# Patient Record
Sex: FEMALE | Race: WHITE | NOT HISPANIC OR LATINO | Employment: FULL TIME | URBAN - METROPOLITAN AREA
[De-identification: names, ages, dates, MRNs, and addresses within clinical notes are randomized per-mention and may not be internally consistent; named-entity substitution may affect disease eponyms.]

---

## 2017-01-05 ENCOUNTER — TELEPHONE (OUTPATIENT)
Dept: ENDOCRINOLOGY | Facility: MEDICAL CENTER | Age: 32
End: 2017-01-05

## 2017-01-05 DIAGNOSIS — E03.9 ACQUIRED HYPOTHYROIDISM: ICD-10-CM

## 2017-01-05 NOTE — TELEPHONE ENCOUNTER
Pt did what she was told with her medication. She still doesn't feel well or that upping the dose changed anything. She is wondering what she can do now. Do you want her to do more labs or change dosing again?

## 2017-01-06 ENCOUNTER — HOSPITAL ENCOUNTER (OUTPATIENT)
Dept: LAB | Facility: MEDICAL CENTER | Age: 32
End: 2017-01-06
Attending: INTERNAL MEDICINE
Payer: COMMERCIAL

## 2017-01-06 DIAGNOSIS — E03.9 ACQUIRED HYPOTHYROIDISM: ICD-10-CM

## 2017-01-06 LAB
T4 FREE SERPL-MCNC: 1.3 NG/DL (ref 0.53–1.43)
TSH SERPL DL<=0.005 MIU/L-ACNC: 0.64 UIU/ML (ref 0.3–3.7)

## 2017-01-06 PROCEDURE — 36415 COLL VENOUS BLD VENIPUNCTURE: CPT

## 2017-01-06 PROCEDURE — 84439 ASSAY OF FREE THYROXINE: CPT

## 2017-01-06 PROCEDURE — 84443 ASSAY THYROID STIM HORMONE: CPT

## 2017-01-16 ENCOUNTER — TELEPHONE (OUTPATIENT)
Dept: MEDICAL GROUP | Facility: MEDICAL CENTER | Age: 32
End: 2017-01-16

## 2017-01-16 DIAGNOSIS — G89.29 CHRONIC ABDOMINAL PAIN: ICD-10-CM

## 2017-01-16 DIAGNOSIS — R10.9 CHRONIC ABDOMINAL PAIN: ICD-10-CM

## 2017-01-16 NOTE — TELEPHONE ENCOUNTER
1. Caller Name: Roxane                                         Call Back Number: [phone]      Patient approves a detailed voicemail message: N\A    2. SPECIFIC Action To Be Taken: Referral pending, please sign.    3. Diagnosis/Clinical Reason for Request: Chronic Abdominal Pain    4. Specialty & Provider Name/Lab/Imaging Location: Acupuncture    5. Is appointment scheduled for requested order/referral: no    Patient informed they will receive a return phone call from the office ONLY if there are any questions before processing their request. Advised to call back if they haven't received a call from the referral department in 5 days.

## 2017-02-15 ENCOUNTER — OFFICE VISIT (OUTPATIENT)
Dept: OTHER | Facility: IMAGING CENTER | Age: 32
End: 2017-02-15
Payer: COMMERCIAL

## 2017-02-15 DIAGNOSIS — E03.9 HYPOTHYROIDISM, UNSPECIFIED TYPE: ICD-10-CM

## 2017-02-15 DIAGNOSIS — R10.13 DYSPEPSIA: ICD-10-CM

## 2017-02-15 PROCEDURE — 97811 ACUP 1/> W/O ESTIM EA ADD 15: CPT | Performed by: FAMILY MEDICINE

## 2017-02-15 PROCEDURE — 97810 ACUP 1/> WO ESTIM 1ST 15 MIN: CPT | Performed by: FAMILY MEDICINE

## 2017-02-15 PROCEDURE — 99203 OFFICE O/P NEW LOW 30 MIN: CPT | Mod: 25 | Performed by: FAMILY MEDICINE

## 2017-02-15 NOTE — MR AVS SNAPSHOT
Roxane Toth   2/15/2017 9:00 AM   Office Visit   MRN: [id number]    Department:  Acupuncture Med   Dept Phone:  [phone]    Description:  Female : 1985   Provider:  Damion Fisher D.O.           Allergies as of 2/15/2017     Allergen Noted Reactions    Iodine 2013   Anaphylaxis, Hives      You were diagnosed with     Dyspepsia   [275686]       Hypothyroidism, unspecified type   [4755624]         Vital Signs     Smoking Status                   Current Every Day Smoker           Basic Information     Date Of Birth Sex Race Ethnicity Preferred Language    1985 Female White Non- English      Your appointments     2017  8:00 AM   ACUPUNCTURE 30 with [facility] (--)    [address]  [place] [address]   [phone]            Mar 10, 2017  2:30 PM   ACUPUNCTURE 30 with [facility] (--)    [address]  [place] [address]   [phone]            2017  1:00 PM   Established Patient with Jamar Doty M.D.   [facility]    [address] [facility], [address]  [facility] [id number] [phone]           You will be receiving a confirmation call a few days before your appointment from our automated call confirmation system.              Problem List              ICD-10-CM Priority Class Noted - Resolved    Hypothyroidism E03.9   3/30/2015 - Present    Migraine G43.909   3/30/2015 - Present    Family history of thyroid cancer Z80.8   2015 - Present    Dyspepsia R10.13   2/15/2017 - Present      Health Maintenance        Date Due Completion Dates    IMM INFLUENZA (1) 2016 10/27/2015, 10/13/2014, 2013, 10/8/2012    PAP SMEAR 2/3/2019 2/3/2016, 2015 (N/S)    Override on 2015: (N/S) ([facility] center)    IMM DTaP/Tdap/Td Vaccine (2 - Td) 2025              Current Immunizations     Influenza TIV (IM) 9/28/2013, 10/8/2012    Influenza Vaccine Quad Inj (Pf) 10/13/2014    Influenza Vaccine Quad Inj (Preserved) 10/27/2015    Pneumococcal polysaccharide vaccine (PPSV-23) 9/28/2013    Tdap Vaccine 12/11/2015    Tuberculin Skin Test 4/17/2013 12:00 PM, 5/23/2012 12:20 PM, 5/1/2012  5:06 PM, 4/23/2012  4:00 PM      Below and/or attached are the medications your provider expects you to take. Review all of your home medications and newly ordered medications with your provider and/or pharmacist. Follow medication instructions as directed by your provider and/or pharmacist. Please keep your medication list with you and share with your provider. Update the information when medications are discontinued, doses are changed, or new medications (including over-the-counter products) are added; and carry medication information at all times in the event of emergency situations     Allergies:  IODINE - Anaphylaxis,Hives               Medications  Valid as of: February 15, 2017 - 10:15 AM    Generic Name Brand Name Tablet Size Instructions for use    Levothyroxine Sodium (Tab) SYNTHROID 112 MCG Take 1 Tab by mouth Every morning on an empty stomach.        Pantoprazole Sodium (Tablet Delayed Response) PROTONIX 40 MG Take 1 Tab by mouth every day.        .                 Medicines prescribed today were sent to:     [facility]/PHARMACY #[id number] - [place] - [address]    [address] [place] [address]    Phone: [phone] Fax: [phone]    Open 24 Hours?: No      Medication refill instructions:       If your prescription bottle indicates you have medication refills left, it is not necessary to call your provider’s office. Please contact your pharmacy and they will refill your medication.    If your prescription bottle indicates you do not have any refills left, you may request refills at any time through one of the following ways: The online [employer] system (except Urgent  Care), by calling your provider’s office, or by asking your pharmacy to contact your provider’s office with a refill request. Medication refills are processed only during regular business hours and may not be available until the next business day. Your provider may request additional information or to have a follow-up visit with you prior to refilling your medication.   *Please Note: Medication refills are assigned a new Rx number when refilled electronically. Your pharmacy may indicate that no refills were authorized even though a new prescription for the same medication is available at the pharmacy. Please request the medicine by name with the pharmacy before contacting your provider for a refill.        Instructions    Have encouraged the patient to rest after the acupuncture session today - taking naps or going to sleep early as necessary.  Increase intake of water and refrain from strenuous activities.  Patient may expect to feel transient worsening of symptoms, but this should resolve to benefit in the next day or two after treatment.    The side effects of Acupuncture needle insertion include: minor bruising, bleeding, or pain at the site of needle insertion.  If more worrisome symptoms, such as continued bleeding, severe bruising, or continue pain or altered sensation persist, please contact [facility] office @ [phone]            X-IO Access Code: Activation code not generated  Current X-IO Status: Active

## 2017-02-15 NOTE — PROGRESS NOTES
[facility] Acupuncture Progress Note  [address] [place] [address]  Dept: [phone]      Patient Name: Roxane Toth   MRN: [id number]  YOB: 1985  PCP: JOSEPH Goodman  Date of Service: 2/15/2017  9:31 AM    CC Stomach burning   HPI Patient is a 31 yo female with increased stomach burning that seems to be returning after stopping Pantoprezole around 3 weeks ago with concerns over the long term effect of PPI.  Since she was 21 she consistently having stomach burning sensation around 1-2 hours after dinner time which would be around 8-9 PM.  She has tried to modify her diet and it only improves a little with no significant lasting effects.  As she becomes more achy in her stomach it also precipitated as bloating and nausea.  Eventually headache.  She also has hypothyroidism and has never been to acupuncture before.     ROS Birthplace: Not asked  Color:  Season:  -handed  Scars:   PMH Past Medical History   Diagnosis Date   • Renal disorder 1/2011     kidney infection   • Heart burn    • Indigestion    • Thyroid disease      Past Surgical History   Procedure Laterality Date   • Elbow orif  1991     right elbow   • Tonsillectomy and adenoidectomy  as child   • Gastroscopy-endo  10/25/2013     Performed by Zohaib Felix M.D. at [facility]   • Egd with asp/bx  10/25/2013     Performed by Zohaib Fleix M.D. at [facility] Social History     Social History   • Marital Status:      Spouse Name: N/A   • Number of Children: N/A   • Years of Education: N/A     Social History Main Topics   • Smoking status: Current Every Day Smoker -- 0.50 packs/day for 10 years     Types: Cigarettes   • Smokeless tobacco: Not on file      Comment: one pack every other day   • Alcohol Use: 0.5 oz/week     1 Glasses of wine per week      Comment: twice a year   • Drug Use: No   • Sexual Activity:     Partners: Male     Birth  Control/ Protection: Pill     Other Topics Concern   • Not on file     Social History Narrative      MEDS Current Outpatient Prescriptions on File Prior to Visit   Medication Sig Dispense Refill   • pantoprazole (PROTONIX) 40 MG Tablet Delayed Response Take 1 Tab by mouth every day. 30 Tab 11   • SYNTHROID 112 MCG Tab Take 1 Tab by mouth Every morning on an empty stomach. 90 Tab 3     No current facility-administered medications on file prior to visit.      ALLERGIES Allergies   Allergen Reactions   • Iodine Anaphylaxis and Hives      PE Praveen Exam: Stomach Qi: (+) pecking radial pulse  (+) Oketsu, (+) Immune,   (R) Adrenal - B/LKid16 St9 DaiMai ASIS Kid2         Assessment Eastern Liver/blood stagnation, Stomach qi imbalance, Immune imbalance, Adrenal exhaustion.    Western Encounter Diagnoses   Name Primary?   • Dyspepsia    • Hypothyroidism, unspecified type                   Plan Set 1: Left (Lv4, Lu5)  Set 2: B/L LI10-11 area  Set 3: R (LI 4 --> LI 10, TW 5, SP 6 --> SP 9, LR 8), L (HANS 7 --> HANS 5, PC 6 ST 39 --> ST 36, GB 34, Ht 5)  Set 4: Stomach area (B/L Er pung 2 ji),      Damion Fisher D.O.    >16 minutes of this 30 minute interview were spent in discussing the benefits and utility of acupuncture.  I answered patient questions about efficacy, safety, and what to expect during a treatment, and the likely number of treatments needed.  Patient will schedule another appointment to return for treatment.

## 2017-02-15 NOTE — PATIENT INSTRUCTIONS
Have encouraged the patient to rest after the acupuncture session today - taking naps or going to sleep early as necessary.  Increase intake of water and refrain from strenuous activities.  Patient may expect to feel transient worsening of symptoms, but this should resolve to benefit in the next day or two after treatment.    The side effects of Acupuncture needle insertion include: minor bruising, bleeding, or pain at the site of needle insertion.  If more worrisome symptoms, such as continued bleeding, severe bruising, or continue pain or altered sensation persist, please contact [facility] office @ [phone]

## 2017-03-22 ENCOUNTER — OFFICE VISIT (OUTPATIENT)
Dept: OTHER | Facility: IMAGING CENTER | Age: 32
End: 2017-03-22
Payer: COMMERCIAL

## 2017-03-22 DIAGNOSIS — G43.709 CHRONIC MIGRAINE WITHOUT AURA WITHOUT STATUS MIGRAINOSUS, NOT INTRACTABLE: ICD-10-CM

## 2017-03-22 DIAGNOSIS — R10.13 DYSPEPSIA: ICD-10-CM

## 2017-03-22 PROCEDURE — 99213 OFFICE O/P EST LOW 20 MIN: CPT | Mod: 25 | Performed by: FAMILY MEDICINE

## 2017-03-22 PROCEDURE — 97811 ACUP 1/> W/O ESTIM EA ADD 15: CPT | Performed by: FAMILY MEDICINE

## 2017-03-22 PROCEDURE — 97813 ACUP 1/> W/ESTIM 1ST 15 MIN: CPT | Performed by: FAMILY MEDICINE

## 2017-03-22 NOTE — MR AVS SNAPSHOT
Roxane Toth   3/22/2017 8:00 AM   Office Visit   MRN: [id number]    Department:  Acupuncture Med   Dept Phone:  [phone]    Description:  Female : 1985   Provider:  Damion Fisher D.O.           Allergies as of 3/22/2017     Allergen Noted Reactions    Iodine 2013   Anaphylaxis, Hives      You were diagnosed with     Dyspepsia   [856574]       Chronic migraine without aura without status migrainosus, not intractable   [840796]         Vital Signs     Smoking Status                   Current Every Day Smoker           Basic Information     Date Of Birth Sex Race Ethnicity Preferred Language    1985 Female White Non- English      Your appointments     2017  2:30 PM   ACUPUNCTURE 30 with Damion Fisher D.O.   [facility] (--)    [address] [facility].  [facility] [id number] [phone]            2017  1:00 PM   Established Patient with Jamar Doty M.D.   [facility]    [address]  [facility] [place] [facility] [id number] [phone]           You will be receiving a confirmation call a few days before your appointment from our automated call confirmation system.              Problem List              ICD-10-CM Priority Class Noted - Resolved    Hypothyroidism E03.9   3/30/2015 - Present    Migraine G43.909   3/30/2015 - Present    Family history of thyroid cancer Z80.8   2015 - Present    Dyspepsia R10.13   2/15/2017 - Present      Health Maintenance        Date Due Completion Dates    PAP SMEAR 2/3/2019 2/3/2016, 2015 (N/S)    Override on 2015: (N/S) ([facility] center)    IMM DTaP/Tdap/Td Vaccine (2 - Td) 2025            Current Immunizations     Influenza TIV (IM) 2016, 2013, 10/8/2012    Influenza Vaccine Quad Inj (Pf) 10/13/2014    Influenza Vaccine Quad Inj (Preserved) 10/27/2015    Pneumococcal polysaccharide vaccine  (PPSV-23) 9/28/2013    Tdap Vaccine 12/11/2015    Tuberculin Skin Test 4/17/2013 12:00 PM, 5/23/2012 12:20 PM, 5/1/2012  5:06 PM, 4/23/2012  4:00 PM      Below and/or attached are the medications your provider expects you to take. Review all of your home medications and newly ordered medications with your provider and/or pharmacist. Follow medication instructions as directed by your provider and/or pharmacist. Please keep your medication list with you and share with your provider. Update the information when medications are discontinued, doses are changed, or new medications (including over-the-counter products) are added; and carry medication information at all times in the event of emergency situations     Allergies:  IODINE - Anaphylaxis,Hives               Medications  Valid as of: March 22, 2017 -  9:03 AM    Generic Name Brand Name Tablet Size Instructions for use    Levothyroxine Sodium (Tab) SYNTHROID 112 MCG Take 1 Tab by mouth Every morning on an empty stomach.        Pantoprazole Sodium (Tablet Delayed Response) PROTONIX 40 MG Take 1 Tab by mouth every day.        .                 Medicines prescribed today were sent to:     [facility]/PHARMACY #[id number] - [place] - [address]    [address] [place] [address]    Phone: [phone] Fax: [phone]    Open 24 Hours?: No      Medication refill instructions:       If your prescription bottle indicates you have medication refills left, it is not necessary to call your provider’s office. Please contact your pharmacy and they will refill your medication.    If your prescription bottle indicates you do not have any refills left, you may request refills at any time through one of the following ways: The online [employer] system (except Urgent Care), by calling your provider’s office, or by asking your pharmacy to contact your provider’s office with a refill request. Medication refills are processed only during regular business hours and may not be available  until the next business day. Your provider may request additional information or to have a follow-up visit with you prior to refilling your medication.   *Please Note: Medication refills are assigned a new Rx number when refilled electronically. Your pharmacy may indicate that no refills were authorized even though a new prescription for the same medication is available at the pharmacy. Please request the medicine by name with the pharmacy before contacting your provider for a refill.        Instructions    Have encouraged the patient to rest after the acupuncture session today - taking naps or going to sleep early as necessary.  Increase intake of water and refrain from strenuous activities.  Patient may expect to feel transient worsening of symptoms, but this should resolve to benefit in the next day or two after treatment.    The side effects of Acupuncture needle insertion include: minor bruising, bleeding, or pain at the site of needle insertion.  If more worrisome symptoms, such as continued bleeding, severe bruising, or continue pain or altered sensation persist, please contact [facility] @ [phone]           My Pick Box Access Code: Activation code not generated  Current My Pick Box Status: Active          Quit Tobacco Information     Do you want to quit using tobacco?    Quitting tobacco decreases risks of cancer, heart and lung disease, increases life expectancy, improves sense of taste and smell, and increases spending money, among other benefits.    If you are thinking about quitting, we can help.  • [facility] Quit Tobacco Program: 194.330.9497  o Program occurs weekly for four weeks and includes pharmacist consultation on products to support quitting smoking or chewing tobacco. A provider referral is needed for pharmacist consultation.  • Tobacco Users Help Hotline: 6-176-QUIT-NOW (147-5488) or https://[place].quitlogix.org/  o Free, confidential telephone and online coaching for [place]  residents. Sessions are designed on a schedule that is convenient for you. Eligible clients receive free nicotine replacement therapy.  • Nationally: www.smokefree.gov  o Information and professional assistance to support both immediate and long-term needs as you become, and remain, a non-smoker. Smokefree.gov allows you to choose the help that best fits your needs.

## 2017-03-22 NOTE — PATIENT INSTRUCTIONS
Have encouraged the patient to rest after the acupuncture session today - taking naps or going to sleep early as necessary.  Increase intake of water and refrain from strenuous activities.  Patient may expect to feel transient worsening of symptoms, but this should resolve to benefit in the next day or two after treatment.    The side effects of Acupuncture needle insertion include: minor bruising, bleeding, or pain at the site of needle insertion.  If more worrisome symptoms, such as continued bleeding, severe bruising, or continue pain or altered sensation persist, please contact [facility] office @ [phone]

## 2017-03-22 NOTE — PROGRESS NOTES
[facility] Acupuncture Progress Note  [address] [place] [address]  Dept: [phone]      Patient Name: Roxane Toth   MRN: [id number]  YOB: 1985  PCP: JOSEPH Goodman  Date of Service: 3/22/2017  8:38 AM    CC Stomach burning   HPI Patient is a 31 yo female with increased stomach burning that seems to be returning after stopping Pantoprezole around 3 weeks ago with concerns over the long term effect of PPI.  Since she was 21 she consistently having stomach burning sensation around 1-2 hours after dinner time which would be around 8-9 PM.  She has tried to modify her diet and it only improves a little with no significant lasting effects.  As she becomes more achy in her stomach it also precipitated as bloating and nausea.  Eventually headache.  She also has hypothyroidism and has never been to acupuncture before.  Since last tx she has experienced good reduction of burning with only one episode of mild one lasting within an hour.  Her energy level has been increased along with no more headache since last tx, she is very pleased with this.   ROS Birthplace: Not asked  Color:  Season:  -handed  Scars:   PMH Past Medical History   Diagnosis Date   • Renal disorder 1/2011     kidney infection   • Heart burn    • Indigestion    • Thyroid disease      Past Surgical History   Procedure Laterality Date   • Elbow orif  1991     right elbow   • Tonsillectomy and adenoidectomy  as child   • Gastroscopy-endo  10/25/2013     Performed by Zohaib Felix M.D. at [facility]   • Egd with asp/bx  10/25/2013     Performed by Zohaib [facility] M.D. at [facility] Social History     Social History   • Marital Status:      Spouse Name: N/A   • Number of Children: N/A   • Years of Education: N/A     Social History Main Topics   • Smoking status: Current Every Day Smoker -- 0.50 packs/day for 10 years     Types: Cigarettes   •  Smokeless tobacco: Not on file      Comment: one pack every other day   • Alcohol Use: 0.5 oz/week     1 Glasses of wine per week      Comment: twice a year   • Drug Use: No   • Sexual Activity:     Partners: Male     Birth Control/ Protection: Pill     Other Topics Concern   • Not on file     Social History Narrative      MEDS Current Outpatient Prescriptions on File Prior to Visit   Medication Sig Dispense Refill   • pantoprazole (PROTONIX) 40 MG Tablet Delayed Response Take 1 Tab by mouth every day. 30 Tab 11   • SYNTHROID 112 MCG Tab Take 1 Tab by mouth Every morning on an empty stomach. 90 Tab 3     No current facility-administered medications on file prior to visit.      ALLERGIES Allergies   Allergen Reactions   • Iodine Anaphylaxis and Hives      PE Praveen Exam: Stomach Qi: (+) pecking radial pulse  (+) Oketsu, (+) Immune,   (R) Adrenal - B/LKid16 St9 DaiMai ASIS Kid2         Assessment Eastern Liver/blood stagnation, Stomach qi imbalance, Immune imbalance, Adrenal exhaustion.    Western Encounter Diagnoses   Name Primary?   • Dyspepsia    • Chronic migraine without aura without status migrainosus, not intractable                   Plan Set 1: Left (Lv4, Lu5)  Set 2: B/L LI10-11 area  Set 3: L (LI 4 --> LI 10, TW 5, SP 6 --> SP 9, LR 8), R (HANS 7 --> HANS 5, PC 6 ST 39 --> ST 36, GB 34, Ht 5)  Set 4: Stomach area (B/L Er pung 2 ji),      Damion Fisher D.O.    >More than 15 minutes were spent discussing with the patient about her condition which did not include procedure time for the treatment.

## 2017-04-14 ENCOUNTER — OFFICE VISIT (OUTPATIENT)
Dept: OTHER | Facility: IMAGING CENTER | Age: 32
End: 2017-04-14
Payer: COMMERCIAL

## 2017-04-14 DIAGNOSIS — E03.9 HYPOTHYROIDISM, UNSPECIFIED TYPE: ICD-10-CM

## 2017-04-14 DIAGNOSIS — R10.13 DYSPEPSIA: ICD-10-CM

## 2017-04-14 DIAGNOSIS — G43.709 CHRONIC MIGRAINE WITHOUT AURA WITHOUT STATUS MIGRAINOSUS, NOT INTRACTABLE: ICD-10-CM

## 2017-04-14 PROCEDURE — 97813 ACUP 1/> W/ESTIM 1ST 15 MIN: CPT | Performed by: FAMILY MEDICINE

## 2017-04-14 PROCEDURE — 97811 ACUP 1/> W/O ESTIM EA ADD 15: CPT | Performed by: FAMILY MEDICINE

## 2017-04-14 PROCEDURE — 99213 OFFICE O/P EST LOW 20 MIN: CPT | Mod: 25 | Performed by: FAMILY MEDICINE

## 2017-04-14 NOTE — PATIENT INSTRUCTIONS
Have encouraged the patient to rest after the acupuncture session today - taking naps or going to sleep early as necessary.  Increase intake of water and refrain from strenuous activities.  Patient may expect to feel transient worsening of symptoms, but this should resolve to benefit in the next day or two after treatment.    The side effects of Acupuncture needle insertion include: minor bruising, bleeding, or pain at the site of needle insertion.  If more worrisome symptoms, such as continued bleeding, severe bruising, or continue pain or altered sensation persist, please contact [facility] office @ [phone]

## 2017-04-14 NOTE — MR AVS SNAPSHOT
Roxane Toth   2017 2:30 PM   Office Visit   MRN: [id number]    Department:  Acupuncture Med   Dept Phone:  [phone]    Description:  Female : 1985   Provider:  Damion Fisher D.O.           Allergies as of 2017     Allergen Noted Reactions    Iodine 2013   Anaphylaxis, Hives      You were diagnosed with     Dyspepsia   [410489]       Hypothyroidism, unspecified type   [2577747]       Chronic migraine without aura without status migrainosus, not intractable   [943442]         Vital Signs     Smoking Status                   Current Every Day Smoker           Basic Information     Date Of Birth Sex Race Ethnicity Preferred Language    1985 Female White Non- English      Your appointments     May 02, 2017 10:30 AM   ACUPUNCTURE 30 with Damion Fisher D.O.   [facility] (--)    [address] [facility].  [place] [id number] [phone]            2017  1:00 PM   Established Patient with Jamar Doty M.D.   [facility]    [address]  [place] [id number] [phone]           You will be receiving a confirmation call a few days before your appointment from our automated call confirmation system.              Problem List              ICD-10-CM Priority Class Noted - Resolved    Hypothyroidism E03.9   3/30/2015 - Present    Migraine G43.909   3/30/2015 - Present    Family history of thyroid cancer Z80.8   2015 - Present    Dyspepsia R10.13   2/15/2017 - Present      Health Maintenance        Date Due Completion Dates    PAP SMEAR 2/3/2019 2/3/2016, 2015 (N/S)    Override on 2015: (N/S) ([facility] center)    IMM DTaP/Tdap/Td Vaccine (2 - Td) 2025            Current Immunizations     Influenza TIV (IM) 2016, 2013, 10/8/2012    Influenza Vaccine Quad Inj (Pf) 10/13/2014    Influenza Vaccine Quad Inj (Preserved)  10/27/2015    Pneumococcal polysaccharide vaccine (PPSV-23) 9/28/2013    Tdap Vaccine 12/11/2015    Tuberculin Skin Test 4/17/2013 12:00 PM, 5/23/2012 12:20 PM, 5/1/2012  5:06 PM, 4/23/2012  4:00 PM      Below and/or attached are the medications your provider expects you to take. Review all of your home medications and newly ordered medications with your provider and/or pharmacist. Follow medication instructions as directed by your provider and/or pharmacist. Please keep your medication list with you and share with your provider. Update the information when medications are discontinued, doses are changed, or new medications (including over-the-counter products) are added; and carry medication information at all times in the event of emergency situations     Allergies:  IODINE - Anaphylaxis,Hives               Medications  Valid as of: April 14, 2017 -  3:26 PM    Generic Name Brand Name Tablet Size Instructions for use    Levothyroxine Sodium (Tab) SYNTHROID 112 MCG Take 1 Tab by mouth Every morning on an empty stomach.        Pantoprazole Sodium (Tablet Delayed Response) PROTONIX 40 MG Take 1 Tab by mouth every day.        .                 Medicines prescribed today were sent to:     [facility]ACY #[id number] - [place], [place] - [address]    [address] [place] [address]    Phone: [phone] Fax: [phone]    Open 24 Hours?: No      Medication refill instructions:       If your prescription bottle indicates you have medication refills left, it is not necessary to call your provider’s office. Please contact your pharmacy and they will refill your medication.    If your prescription bottle indicates you do not have any refills left, you may request refills at any time through one of the following ways: The online [employer] system (except Urgent Care), by calling your provider’s office, or by asking your pharmacy to contact your provider’s office with a refill request. Medication refills are processed only  during regular business hours and may not be available until the next business day. Your provider may request additional information or to have a follow-up visit with you prior to refilling your medication.   *Please Note: Medication refills are assigned a new Rx number when refilled electronically. Your pharmacy may indicate that no refills were authorized even though a new prescription for the same medication is available at the pharmacy. Please request the medicine by name with the pharmacy before contacting your provider for a refill.        Instructions    Have encouraged the patient to rest after the acupuncture session today - taking naps or going to sleep early as necessary.  Increase intake of water and refrain from strenuous activities.  Patient may expect to feel transient worsening of symptoms, but this should resolve to benefit in the next day or two after treatment.    The side effects of Acupuncture needle insertion include: minor bruising, bleeding, or pain at the site of needle insertion.  If more worrisome symptoms, such as continued bleeding, severe bruising, or continue pain or altered sensation persist, please contact [facility] office @ [phone]            [employer]evl Access Code: Activation code not generated  Current meevl Status: Active          Quit Tobacco Information     Do you want to quit using tobacco?    Quitting tobacco decreases risks of cancer, heart and lung disease, increases life expectancy, improves sense of taste and smell, and increases spending money, among other benefits.    If you are thinking about quitting, we can help.  • [facility] Quit Tobacco Program: [phone]  o Program occurs weekly for four weeks and includes pharmacist consultation on products to support quitting smoking or chewing tobacco. A provider referral is needed for pharmacist consultation.  • Tobacco Users Help Hotline: 2-935-QUIT-NOW (943-3393) or  https://[place].quitlogix.org/  o Free, confidential telephone and online coaching for [place] residents. Sessions are designed on a schedule that is convenient for you. Eligible clients receive free nicotine replacement therapy.  • Nationally: www.smokefree.gov  o Information and professional assistance to support both immediate and long-term needs as you become, and remain, a non-smoker. Smokefree.gov allows you to choose the help that best fits your needs.

## 2017-04-14 NOTE — PROGRESS NOTES
[facility] Acupuncture Progress Note  [address] [place] [address]  Dept: [phone]      Patient Name: Roxane Toth   MRN: [id number]  YOB: 1985  PCP: JOSEPH Goodman  Date of Service: 4/14/2017  2:27 PM    CC Stomach burning   HPI Patient is a 31 yo female with increased stomach burning that seems to be returning after stopping Pantoprezole around 3 weeks ago with concerns over the long term effect of PPI.  Since she was 21 she consistently having stomach burning sensation around 1-2 hours after dinner time which would be around 8-9 PM.  She has tried to modify her diet and it only improves a little with no significant lasting effects.  As she becomes more achy in her stomach it also precipitated as bloating and nausea.  Eventually headache.  She also has hypothyroidism and has never been to acupuncture before.  Since last tx she has experienced good reduction of burning with only one episode of mild one lasting within an hour.  Her energy level has been increased along with no more headache since last tx, she is very pleased with this.   ROS Birthplace: Not asked  Color:  Season:  -handed  Scars:   PMH Past Medical History   Diagnosis Date   • Renal disorder 1/2011     kidney infection   • Heart burn    • Indigestion    • Thyroid disease      Past Surgical History   Procedure Laterality Date   • Elbow orif  1991     right elbow   • Tonsillectomy and adenoidectomy  as child   • Gastroscopy-endo  10/25/2013     Performed by Zohaib Felix M.D. at [facility]   • Egd with asp/bx  10/25/2013     Performed by Z[facility]ib [facility] M[facility]D. at [facility] Social History     Social History   • Marital Status:      Spouse Name: N/A   • Number of Children: N/A   • Years of Education: N/A     Social History Main Topics   • Smoking status: Current Every Day Smoker -- 0.50 packs/day for 10 years     Types: Cigarettes   •  Smokeless tobacco: Not on file      Comment: one pack every other day   • Alcohol Use: 0.5 oz/week     1 Glasses of wine per week      Comment: twice a year   • Drug Use: No   • Sexual Activity:     Partners: Male     Birth Control/ Protection: Pill     Other Topics Concern   • Not on file     Social History Narrative      MEDS Current Outpatient Prescriptions on File Prior to Visit   Medication Sig Dispense Refill   • pantoprazole (PROTONIX) 40 MG Tablet Delayed Response Take 1 Tab by mouth every day. 30 Tab 11   • SYNTHROID 112 MCG Tab Take 1 Tab by mouth Every morning on an empty stomach. 90 Tab 3     No current facility-administered medications on file prior to visit.      ALLERGIES Allergies   Allergen Reactions   • Iodine Anaphylaxis and Hives      PE Praveen Exam: Stomach Qi: (+) pecking radial pulse  (+) Oketsu, (+) Immune,   (R) Adrenal - B/LKid16 St9 DaiMai ASIS Kid2         Assessment Eastern Liver/blood stagnation, Stomach qi imbalance, Immune imbalance, Adrenal exhaustion.    Western Encounter Diagnoses   Name Primary?   • Dyspepsia    • Hypothyroidism, unspecified type    • Chronic migraine without aura without status migrainosus, not intractable                   Plan Set 1: Left (Lv4, Lu5)  Set 2: B/L LI10-11 area  Set 3: L (LI 4 --> LI 10, TW 5, SP 6 ++> SP 9, LR 8), R (HANS 7 --> HANS 5, PC 6 ST 39 ++> ST 36, GB 34, Ht 5)  Set 4: Stomach area (B/L Er pung 2 ji),      Damion Fisher D.O.     >More than 15 minutes were spent discussing with the patient about her condition which did not include procedure time. Total acupuncture treatment time = 45 minutes

## 2017-05-02 ENCOUNTER — OFFICE VISIT (OUTPATIENT)
Dept: OTHER | Facility: IMAGING CENTER | Age: 32
End: 2017-05-02
Payer: COMMERCIAL

## 2017-05-02 DIAGNOSIS — R10.13 DYSPEPSIA: ICD-10-CM

## 2017-05-02 DIAGNOSIS — R23.2 HOT FLASHES: ICD-10-CM

## 2017-05-02 PROCEDURE — 97813 ACUP 1/> W/ESTIM 1ST 15 MIN: CPT | Performed by: FAMILY MEDICINE

## 2017-05-02 PROCEDURE — 97811 ACUP 1/> W/O ESTIM EA ADD 15: CPT | Performed by: FAMILY MEDICINE

## 2017-05-02 PROCEDURE — 99999 PR NO CHARGE: CPT | Performed by: FAMILY MEDICINE

## 2017-05-02 NOTE — MR AVS SNAPSHOT
Roxane Toth   2017 10:30 AM   Office Visit   MRN: [id number]    Department:  Acupuncture Med   Dept Phone:  [phone]    Description:  Female : 1985   Provider:  Damion Fisher D.O.           Allergies as of 2017     Allergen Noted Reactions    Iodine 2013   Anaphylaxis, Hives      You were diagnosed with     Dyspepsia   [428061]       Hot flashes   [[id number]]         Vital Signs     Smoking Status                   Current Every Day Smoker           Basic Information     Date Of Birth Sex Race Ethnicity Preferred Language    1985 Female White Non- English      Your appointments     May 30, 2017 10:00 AM   ACUPUNCTURE 30 with Damion Fisher D.O.   [facility] (--)    [address] [facility].  [facility] [id number] [phone]            2017  1:00 PM   Established Patient with Jamar Doty M.D.   [facility]    [address]  [place] [id number] [phone]           You will be receiving a confirmation call a few days before your appointment from our automated call confirmation system.              Problem List              ICD-10-CM Priority Class Noted - Resolved    Hypothyroidism E03.9   3/30/2015 - Present    Migraine G43.909   3/30/2015 - Present    Family history of thyroid cancer Z80.8   2015 - Present    Dyspepsia R10.13   2/15/2017 - Present    Hot flashes R23.2   2017 - Present      Health Maintenance        Date Due Completion Dates    PAP SMEAR 2/3/2019 2/3/2016, 2015 (N/S)    Override on 2015: (N/S) ([facility] center)    IMM DTaP/Tdap/Td Vaccine (2 - Td) 2025            Current Immunizations     Influenza TIV (IM) 2016, 2013, 10/8/2012    Influenza Vaccine Quad Inj (Pf) 10/13/2014    Influenza Vaccine Quad Inj (Preserved) 10/27/2015    Pneumococcal polysaccharide vaccine (PPSV-23) 2013    Tdap Vaccine 12/11/2015    Tuberculin Skin Test 4/17/2013 12:00 PM, 5/23/2012 12:20 PM, 5/1/2012  5:06 PM, 4/23/2012  4:00 PM      Below and/or attached are the medications your provider expects you to take. Review all of your home medications and newly ordered medications with your provider and/or pharmacist. Follow medication instructions as directed by your provider and/or pharmacist. Please keep your medication list with you and share with your provider. Update the information when medications are discontinued, doses are changed, or new medications (including over-the-counter products) are added; and carry medication information at all times in the event of emergency situations     Allergies:  IODINE - Anaphylaxis,Hives               Medications  Valid as of: May 02, 2017 - 11:40 AM    Generic Name Brand Name Tablet Size Instructions for use    Levothyroxine Sodium (Tab) SYNTHROID 112 MCG Take 1 Tab by mouth Every morning on an empty stomach.        Pantoprazole Sodium (Tablet Delayed Response) PROTONIX 40 MG Take 1 Tab by mouth every day.        .                 Medicines prescribed today were sent to:     [facility]/PHARMACY #[id number] - [place], [place] - [address] [facility]    [address] [place] [address]    Phone: [phone] Fax: [phone]    Open 24 Hours?: No      Medication refill instructions:       If your prescription bottle indicates you have medication refills left, it is not necessary to call your provider’s office. Please contact your pharmacy and they will refill your medication.    If your prescription bottle indicates you do not have any refills left, you may request refills at any time through one of the following ways: The online "[employer]" system (except Urgent Care), by calling your provider’s office, or by asking your pharmacy to contact your provider’s office with a refill request. Medication refills are processed only during regular business hours and may not be available until the next business  day. Your provider may request additional information or to have a follow-up visit with you prior to refilling your medication.   *Please Note: Medication refills are assigned a new Rx number when refilled electronically. Your pharmacy may indicate that no refills were authorized even though a new prescription for the same medication is available at the pharmacy. Please request the medicine by name with the pharmacy before contacting your provider for a refill.        Instructions    Have encouraged the patient to rest after the acupuncture session today - taking naps or going to sleep early as necessary.  Increase intake of water and refrain from strenuous activities.  Patient may expect to feel transient worsening of symptoms, but this should resolve to benefit in the next day or two after treatment.    The side effects of Acupuncture needle insertion include: minor bruising, bleeding, or pain at the site of needle insertion.  If more worrisome symptoms, such as continued bleeding, severe bruising, or continue pain or altered sensation persist, please contact [facility] office @ [phone]            [employer] Access Code: Activation code not generated  Current [employer] Status: Active          Quit Tobacco Information     Do you want to quit using tobacco?    Quitting tobacco decreases risks of cancer, heart and lung disease, increases life expectancy, improves sense of taste and smell, and increases spending money, among other benefits.    If you are thinking about quitting, we can help.  • [facility] Quit Tobacco Program: [phone]  o Program occurs weekly for four weeks and includes pharmacist consultation on products to support quitting smoking or chewing tobacco. A provider referral is needed for pharmacist consultation.  • Tobacco Users Help Hotline: 2-377-QUIT-NOW (677-5599) or https://[place].quitlogix.org/  o Free, confidential telephone and online coaching for [place] residents. Sessions  are designed on a schedule that is convenient for you. Eligible clients receive free nicotine replacement therapy.  • Nationally: www.smokefree.gov  o Information and professional assistance to support both immediate and long-term needs as you become, and remain, a non-smoker. Smokefree.gov allows you to choose the help that best fits your needs.

## 2017-05-02 NOTE — PATIENT INSTRUCTIONS
Have encouraged the patient to rest after the acupuncture session today - taking naps or going to sleep early as necessary.  Increase intake of water and refrain from strenuous activities.  Patient may expect to feel transient worsening of symptoms, but this should resolve to benefit in the next day or two after treatment.    The side effects of Acupuncture needle insertion include: minor bruising, bleeding, or pain at the site of needle insertion.  If more worrisome symptoms, such as continued bleeding, severe bruising, or continue pain or altered sensation persist, please contact [facility] office @ [phone]

## 2017-05-02 NOTE — PROGRESS NOTES
[facility] Acupuncture Progress Note  [address] [place] [address]  Dept: [phone]      Patient Name: Roxane Toth   MRN: [id number]  YOB: 1985  PCP: JOSEPH Goodman  Date of Service: 5/2/2017 10:46 AM    CC Stomach burning   HPI Patient is a 33 yo female with increased stomach burning that seems to be returning after stopping Pantoprezole around 3 weeks ago with concerns over the long term effect of PPI.  Since she was 21 she consistently having stomach burning sensation around 1-2 hours after dinner time which would be around 8-9 PM.  She has tried to modify her diet and it only improves a little with no significant lasting effects.  As she becomes more achy in her stomach it also precipitated as bloating and nausea.  Eventually headache.  She also has hypothyroidism and has never been to acupuncture before.    Since last tx she has experienced continue good reduction of burning with no more episode of mild one.  Her energy level has been increased along with no more headache since last tx, her hot flashes has been intermittent at night though.   ROS Birthplace: Not asked  Color:  Season:  -handed  Scars:   PMH Past Medical History   Diagnosis Date   • Renal disorder 1/2011     kidney infection   • Heart burn    • Indigestion    • Thyroid disease      Past Surgical History   Procedure Laterality Date   • Elbow orif  1991     right elbow   • Tonsillectomy and adenoidectomy  as child   • Gastroscopy-endo  10/25/2013     Performed by Zohaib Felix M.D. at [facility]   • Egd with asp/bx  10/25/2013     Performed by Zhoaib [facility] M[facility]D. at [facility] Social History     Social History   • Marital Status:      Spouse Name: N/A   • Number of Children: N/A   • Years of Education: N/A     Social History Main Topics   • Smoking status: Current Every Day Smoker -- 0.50 packs/day for 10 years     Types:  Cigarettes   • Smokeless tobacco: Not on file      Comment: one pack every other day   • Alcohol Use: 0.5 oz/week     1 Glasses of wine per week      Comment: twice a year   • Drug Use: No   • Sexual Activity:     Partners: Male     Birth Control/ Protection: Pill     Other Topics Concern   • Not on file     Social History Narrative      MEDS Current Outpatient Prescriptions on File Prior to Visit   Medication Sig Dispense Refill   • pantoprazole (PROTONIX) 40 MG Tablet Delayed Response Take 1 Tab by mouth every day. 30 Tab 11   • SYNTHROID 112 MCG Tab Take 1 Tab by mouth Every morning on an empty stomach. 90 Tab 3     No current facility-administered medications on file prior to visit.      ALLERGIES Allergies   Allergen Reactions   • Iodine Anaphylaxis and Hives      PE Praveen Exam: Stomach Qi: (+) pecking radial pulse  (+) Oketsu, (+) Immune,   (R) Adrenal - B/LKid16 St9 DaiMai ASIS Kid2         Assessment Eastern Liver/blood stagnation, Stomach qi imbalance, Immune imbalance, Adrenal exhaustion.    Western Encounter Diagnoses   Name Primary?   • Dyspepsia    • Hot flashes                   Plan Set 1: Left (Lv4, Lu5)  Set 2: B/L LI10-11 area  Set 3: L (SI 7 --> SI 13, TW 5, SP 6 --> SP 9, LR 4.5 ++> 8), R (HT 5 --> HT 3, PC 6 ST 39 --> ST 36, GB 36 ++> 34, Ht 5)  Set 4: Stomach area (B/L Er pung 2 ji),      Damion Fisher D.O.     More than 15 minutes were spent discussing with the patient about her condition which did not include procedure time. >50% of the face to face time was spent in counseling and coordination. Topics discussed included:   Good sleep hygiene and diet choices  Total acupuncture treatment time = 45 minutes

## 2017-05-05 DIAGNOSIS — E03.9 HYPOTHYROIDISM, UNSPECIFIED TYPE: ICD-10-CM

## 2017-05-16 ENCOUNTER — HOSPITAL ENCOUNTER (OUTPATIENT)
Dept: LAB | Facility: MEDICAL CENTER | Age: 32
End: 2017-05-16
Attending: INTERNAL MEDICINE
Payer: COMMERCIAL

## 2017-05-16 ENCOUNTER — TELEPHONE (OUTPATIENT)
Dept: ENDOCRINOLOGY | Facility: MEDICAL CENTER | Age: 32
End: 2017-05-16

## 2017-05-16 DIAGNOSIS — E03.9 ACQUIRED HYPOTHYROIDISM: ICD-10-CM

## 2017-05-16 LAB
T4 FREE SERPL-MCNC: 1.04 NG/DL (ref 0.53–1.43)
TSH SERPL DL<=0.005 MIU/L-ACNC: 1.16 UIU/ML (ref 0.3–3.7)

## 2017-05-16 PROCEDURE — 36415 COLL VENOUS BLD VENIPUNCTURE: CPT

## 2017-05-16 PROCEDURE — 84443 ASSAY THYROID STIM HORMONE: CPT

## 2017-05-16 PROCEDURE — 84439 ASSAY OF FREE THYROXINE: CPT

## 2017-05-16 NOTE — TELEPHONE ENCOUNTER
Pt called and she mentioned that she is not feeling well and she is requesting a new set of Thyroid lab order.    Thank you  Sima

## 2017-05-25 RX ORDER — LEVOTHYROXINE SODIUM 112 MCG
112 TABLET ORAL
Qty: 90 TAB | Refills: 0 | Status: SHIPPED | OUTPATIENT
Start: 2017-05-25 | End: 2017-09-16 | Stop reason: SDUPTHER

## 2017-05-30 ENCOUNTER — OFFICE VISIT (OUTPATIENT)
Dept: OTHER | Facility: IMAGING CENTER | Age: 32
End: 2017-05-30
Payer: COMMERCIAL

## 2017-05-30 DIAGNOSIS — R10.13 DYSPEPSIA: ICD-10-CM

## 2017-05-30 DIAGNOSIS — R23.2 HOT FLASHES: ICD-10-CM

## 2017-05-30 PROCEDURE — 99213 OFFICE O/P EST LOW 20 MIN: CPT | Mod: 25 | Performed by: FAMILY MEDICINE

## 2017-05-30 PROCEDURE — 97813 ACUP 1/> W/ESTIM 1ST 15 MIN: CPT | Performed by: FAMILY MEDICINE

## 2017-05-30 PROCEDURE — 97811 ACUP 1/> W/O ESTIM EA ADD 15: CPT | Performed by: FAMILY MEDICINE

## 2017-05-30 NOTE — MR AVS SNAPSHOT
Jaysondimitris Serraata   2017 10:00 AM   Office Visit   MRN: [id number]    Department:  Acupuncture Med   Dept Phone:  [phone]    Description:  Female : 1985   Provider:  Damion Fisher D.O.           Allergies as of 2017     Allergen Noted Reactions    Iodine 2013   Anaphylaxis, Hives      You were diagnosed with     Dyspepsia   [554965]       Hot flashes   [002899]         Vital Signs     Smoking Status                   Current Every Day Smoker           Basic Information     Date Of Birth Sex Race Ethnicity Preferred Language    1985 Female White Non- English      Your appointments     2017  1:00 PM   Established Patient with Jamar Doty M.D.   [facility]    [address], [address]  [place] [address] [phone]           You will be receiving a confirmation call a few days before your appointment from our automated call confirmation system.              Problem List              ICD-10-CM Priority Class Noted - Resolved    Hypothyroidism E03.9   3/30/2015 - Present    Migraine G43.909   3/30/2015 - Present    Family history of thyroid cancer Z80.8   2015 - Present    Dyspepsia R10.13   2/15/2017 - Present    Hot flashes R23.2   2017 - Present      Health Maintenance        Date Due Completion Dates    PAP SMEAR 2/3/2019 2/3/2016, 2015 (N/S)    Override on 2015: (N/S) ([facility] center)    IMM DTaP/Tdap/Td Vaccine (2 - Td) 2025            Current Immunizations     Influenza TIV (IM) 2016, 2013, 10/8/2012    Influenza Vaccine Quad Inj (Pf) 10/13/2014    Influenza Vaccine Quad Inj (Preserved) 10/27/2015    Pneumococcal polysaccharide vaccine (PPSV-23) 2013    Tdap Vaccine 2015    Tuberculin Skin Test 2013 12:00 PM, 2012 12:20 PM, 2012  5:06 PM, 2012  4:00 PM      Below and/or attached are the medications your provider  expects you to take. Review all of your home medications and newly ordered medications with your provider and/or pharmacist. Follow medication instructions as directed by your provider and/or pharmacist. Please keep your medication list with you and share with your provider. Update the information when medications are discontinued, doses are changed, or new medications (including over-the-counter products) are added; and carry medication information at all times in the event of emergency situations     Allergies:  IODINE - Anaphylaxis,Hives               Medications  Valid as of: May 30, 2017 - 10:58 AM    Generic Name Brand Name Tablet Size Instructions for use    Levothyroxine Sodium (Tab) SYNTHROID 112 MCG Take 1 Tab by mouth Every morning on an empty stomach.        Pantoprazole Sodium (Tablet Delayed Response) PROTONIX 40 MG Take 1 Tab by mouth every day.        .                 Medicines prescribed today were sent to:     [facility]/PHARMACY #[id number] - [place], [place] - [address]    [address] [place] [address]    Phone: [phone] Fax: [phone]    Open 24 Hours?: No      Medication refill instructions:       If your prescription bottle indicates you have medication refills left, it is not necessary to call your provider’s office. Please contact your pharmacy and they will refill your medication.    If your prescription bottle indicates you do not have any refills left, you may request refills at any time through one of the following ways: The online [facility] system (except Urgent Care), by calling your provider’s office, or by asking your pharmacy to contact your provider’s office with a refill request. Medication refills are processed only during regular business hours and may not be available until the next business day. Your provider may request additional information or to have a follow-up visit with you prior to refilling your medication.   *Please Note: Medication refills are assigned a new Rx  number when refilled electronically. Your pharmacy may indicate that no refills were authorized even though a new prescription for the same medication is available at the pharmacy. Please request the medicine by name with the pharmacy before contacting your provider for a refill.        Instructions    Have encouraged the patient to rest after the acupuncture session today - taking naps or going to sleep early as necessary.  Increase intake of water and refrain from strenuous activities.  Patient may expect to feel transient worsening of symptoms, but this should resolve to benefit in the next day or two after treatment.    The side effects of Acupuncture needle insertion include: minor bruising, bleeding, or pain at the site of needle insertion.  If more worrisome symptoms, such as continued bleeding, severe bruising, or continue pain or altered sensation persist, please contact [facility] office @ [phone]           Arstasis Access Code: Activation code not generated  Current [employer] Status: Active          Quit Tobacco Information     Do you want to quit using tobacco?    Quitting tobacco decreases risks of cancer, heart and lung disease, increases life expectancy, improves sense of taste and smell, and increases spending money, among other benefits.    If you are thinking about quitting, we can help.  • [facility] Quit Tobacco Program: [phone]  o Program occurs weekly for four weeks and includes pharmacist consultation on products to support quitting smoking or chewing tobacco. A provider referral is needed for pharmacist consultation.  • Tobacco Users Help Hotline: 1-823-QUIT-NOW (736-5074) or https://[place].quitlogix.org/  o Free, confidential telephone and online coaching for [place] residents. Sessions are designed on a schedule that is convenient for you. Eligible clients receive free nicotine replacement therapy.  • Nationally: www.smokefree.gov  o Information and professional  assistance to support both immediate and long-term needs as you become, and remain, a non-smoker. Smokefree.gov allows you to choose the help that best fits your needs.

## 2017-05-30 NOTE — PROGRESS NOTES
[facility] Acupuncture Progress Note  [address] [place] [address]  Dept: [phone]      Patient Name: Roxane Toth   MRN: [id number]  YOB: 1985  PCP: JOSEPH Goodman  Date of Service: 5/30/2017  9:59 AM    CC Stomach burning   HPI Patient is a 31 yo female with increased stomach burning that seems to be returning after stopping Pantoprezole around 3 weeks ago with concerns over the long term effect of PPI.  Since she was 21 years old, she consistently having stomach burning sensation around 1-2 hours after dinner time which would be around 8-9 PM.  She has tried to modify her diet and it only improves a little with no significant lasting effects.  As she becomes more achy in her stomach it also precipitated as bloating and nausea.  Eventually headache.  She also has hypothyroidism and has never been to acupuncture before.    Since last tx she has experienced continue good reduction of burning with 2 episodes of stomach ache.  Her hot flashes has been intermittent at night though to a milder degree.   ROS Birthplace: Not asked  Color:  Season:  -handed  Scars:   PMH Past Medical History   Diagnosis Date   • Renal disorder 1/2011     kidney infection   • Heart burn    • Indigestion    • Thyroid disease      Past Surgical History   Procedure Laterality Date   • Elbow orif  1991     right elbow   • Tonsillectomy and adenoidectomy  as child   • Gastroscopy-endo  10/25/2013     Performed by Zohaib Felix M.D. at [facility]   • Egd with asp/bx  10/25/2013     Performed by Zohaib Felix M.D. at [facility] Social History     Social History   • Marital Status:      Spouse Name: N/A   • Number of Children: N/A   • Years of Education: N/A     Social History Main Topics   • Smoking status: Current Every Day Smoker -- 0.50 packs/day for 10 years     Types: Cigarettes   • Smokeless tobacco: Not on file       Comment: one pack every other day   • Alcohol Use: 0.5 oz/week     1 Glasses of wine per week      Comment: twice a year   • Drug Use: No   • Sexual Activity:     Partners: Male     Birth Control/ Protection: Pill     Other Topics Concern   • Not on file     Social History Narrative      MEDS Current Outpatient Prescriptions on File Prior to Visit   Medication Sig Dispense Refill   • SYNTHROID 112 MCG Tab Take 1 Tab by mouth Every morning on an empty stomach. 90 Tab 0   • pantoprazole (PROTONIX) 40 MG Tablet Delayed Response Take 1 Tab by mouth every day. 30 Tab 11     No current facility-administered medications on file prior to visit.      ALLERGIES Allergies   Allergen Reactions   • Iodine Anaphylaxis and Hives      PE Praveen Exam: Stomach Qi: (+) pecking radial pulse  (+) Oketsu, (+) Immune,   (R) Adrenal - B/LKid16 St9 DaiMai ASIS Kid2         Assessment Eastern Liver/blood stagnation, Stomach qi imbalance, Immune imbalance, Adrenal exhaustion.    Western Encounter Diagnoses   Name Primary?   • Dyspepsia    • Hot flashes                   Plan Set 1: Left (Lv4, Lu5)  Set 2: B/L LI10-11 area  Set 3: L (SI 7 --> SI 13, TW 5, SP 6 --> SP 9, LR 4.5 ++> 8), R (HT 5 --> HT 3, PC 6 ST 39 --> ST 36, GB 36 ++> 34, Ht 5)  Set 4: Stomach area (B/L Er pung 2 ji),      Damion Fisher D.O.     Total face to face time was 20 minutes with more than 15 minutes were spent discussing with the patient about her condition which did not include procedure time. >50% of the face to face time was spent in counseling and coordination. Topics discussed included:   Use yuki tea in the morning for the next 2 weeks and can continue her sleep hygiene with no blue light exposure 2 hours before sleep.  Explanation of mechanism of smooth muscle spasm with increased sympathetic/parasympathatic tone  Total acupuncture treatment time = 45 minutes

## 2017-05-30 NOTE — PATIENT INSTRUCTIONS
Have encouraged the patient to rest after the acupuncture session today - taking naps or going to sleep early as necessary.  Increase intake of water and refrain from strenuous activities.  Patient may expect to feel transient worsening of symptoms, but this should resolve to benefit in the next day or two after treatment.    The side effects of Acupuncture needle insertion include: minor bruising, bleeding, or pain at the site of needle insertion.  If more worrisome symptoms, such as continued bleeding, severe bruising, or continue pain or altered sensation persist, please contact [facility] office @ [phone]

## 2017-06-02 DIAGNOSIS — E03.9 HYPOTHYROIDISM, UNSPECIFIED TYPE: ICD-10-CM

## 2017-06-13 ENCOUNTER — OFFICE VISIT (OUTPATIENT)
Dept: ENDOCRINOLOGY | Facility: MEDICAL CENTER | Age: 32
End: 2017-06-13
Payer: COMMERCIAL

## 2017-06-13 VITALS
OXYGEN SATURATION: 96 % | BODY MASS INDEX: 19.49 KG/M2 | WEIGHT: 117 LBS | HEIGHT: 65 IN | SYSTOLIC BLOOD PRESSURE: 90 MMHG | HEART RATE: 92 BPM | DIASTOLIC BLOOD PRESSURE: 60 MMHG

## 2017-06-13 DIAGNOSIS — E03.9 ACQUIRED HYPOTHYROIDISM: ICD-10-CM

## 2017-06-13 DIAGNOSIS — E04.2 MULTIPLE THYROID NODULES: ICD-10-CM

## 2017-06-13 PROCEDURE — 99214 OFFICE O/P EST MOD 30 MIN: CPT | Performed by: INTERNAL MEDICINE

## 2017-06-13 NOTE — MR AVS SNAPSHOT
"        Roxane Toth   2017 1:00 PM   Office Visit   MRN: [id number]    Department:  Endocrinology Med [facility]   Dept Phone:  [phone]    Description:  Female : 1985   Provider:  Jamar Doty M.D.           Reason for Visit     Follow-Up Hypothyroid      Allergies as of 2017     Allergen Noted Reactions    Iodine 2013   Anaphylaxis, Hives      You were diagnosed with     Acquired hypothyroidism   [4418747]       Multiple thyroid nodules   [106859]         Vital Signs     Blood Pressure Pulse Height Weight Body Mass Index Oxygen Saturation    90/60 mmHg 92 1.651 m (5' 5\") 53.071 kg (117 lb) 19.47 kg/m2 96%    Last Menstrual Period Smoking Status                2017 Current Every Day Smoker          Basic Information     Date Of Birth Sex Race Ethnicity Preferred Language    1985 Female White Non- English      Your appointments     2017  2:30 PM   ACUPUNCTURE 30 with Damion Fisher D.O.   [facility]uncture [facility] (--)    [address]  [place] [id number] [phone]            Dec 12, 2017 12:40 PM   Established Patient with Jamar Doty M.D.   [facility] Endocrinology [facility]    [address], [address]  [place] [address] [phone]           You will be receiving a confirmation call a few days before your appointment from our automated call confirmation system.              Problem List              ICD-10-CM Priority Class Noted - Resolved    Hypothyroidism E03.9   3/30/2015 - Present    Migraine G43.909   3/30/2015 - Present    Family history of thyroid cancer Z80.8   2015 - Present    Dyspepsia R10.13   2/15/2017 - Present    Hot flashes R23.2   2017 - Present      Health Maintenance        Date Due Completion Dates    PAP SMEAR 2/3/2019 2/3/2016, 2015 (N/S)    Override on 2015: (N/S) ([facility] center)    IMM DTaP/Tdap/Td Vaccine (2 - Td) 2025 " 12/11/2015            Current Immunizations     Influenza TIV (IM) 11/18/2016, 9/28/2013, 10/8/2012    Influenza Vaccine Quad Inj (Pf) 10/13/2014    Influenza Vaccine Quad Inj (Preserved) 10/27/2015    Pneumococcal polysaccharide vaccine (PPSV-23) 9/28/2013    Tdap Vaccine 12/11/2015    Tuberculin Skin Test 4/17/2013 12:00 PM, 5/23/2012 12:20 PM, 5/1/2012  5:06 PM, 4/23/2012  4:00 PM      Below and/or attached are the medications your provider expects you to take. Review all of your home medications and newly ordered medications with your provider and/or pharmacist. Follow medication instructions as directed by your provider and/or pharmacist. Please keep your medication list with you and share with your provider. Update the information when medications are discontinued, doses are changed, or new medications (including over-the-counter products) are added; and carry medication information at all times in the event of emergency situations     Allergies:  IODINE - Anaphylaxis,Hives               Medications  Valid as of: June 13, 2017 -  1:23 PM    Generic Name Brand Name Tablet Size Instructions for use    Levothyroxine Sodium (Tab) SYNTHROID 112 MCG Take 1 Tab by mouth Every morning on an empty stomach.        Pantoprazole Sodium (Tablet Delayed Response) PROTONIX 40 MG Take 1 Tab by mouth every day.        .                 Medicines prescribed today were sent to:     [facility]/PHARMACY #[id number] - [place] - [address]    [address] [place] [address]    Phone: [phone] Fax: [phone]    Open 24 Hours?: No      Medication refill instructions:       If your prescription bottle indicates you have medication refills left, it is not necessary to call your provider’s office. Please contact your pharmacy and they will refill your medication.    If your prescription bottle indicates you do not have any refills left, you may request refills at any time through one of the following ways: The online [facility]  system (except Urgent Care), by calling your provider’s office, or by asking your pharmacy to contact your provider’s office with a refill request. Medication refills are processed only during regular business hours and may not be available until the next business day. Your provider may request additional information or to have a follow-up visit with you prior to refilling your medication.   *Please Note: Medication refills are assigned a new Rx number when refilled electronically. Your pharmacy may indicate that no refills were authorized even though a new prescription for the same medication is available at the pharmacy. Please request the medicine by name with the pharmacy before contacting your provider for a refill.        Your To Do List     Future Labs/Procedures Complete By Expires    FREE THYROXINE  As directed 6/13/2018    FREE THYROXINE  As directed 6/13/2018    TSH  As directed 6/13/2018    TSH  As directed 6/13/2018    US-SOFT TISSUES OF HEAD - NECK  As directed 6/13/2018         MyChart Access Code: Activation code not generated  Current [place] Status: Active          Quit Tobacco Information     Do you want to quit using tobacco?    Quitting tobacco decreases risks of cancer, heart and lung disease, increases life expectancy, improves sense of taste and smell, and increases spending money, among other benefits.    If you are thinking about quitting, we can help.  • [facility] Quit Tobacco Program: [phone]  o Program occurs weekly for four weeks and includes pharmacist consultation on products to support quitting smoking or chewing tobacco. A provider referral is needed for pharmacist consultation.  • Tobacco Users Help Hotline: 6-139-QUIT-NOW (627-2053) or https://[place].quitlogix.org/  o Free, confidential telephone and online coaching for [place] residents. Sessions are designed on a schedule that is convenient for you. Eligible clients receive free nicotine replacement therapy.  • Nationally:  www.smokefree.gov  o Information and professional assistance to support both immediate and long-term needs as you become, and remain, a non-smoker. Smokefree.gov allows you to choose the help that best fits your needs.

## 2017-06-13 NOTE — PROGRESS NOTES
"Endocrinology Clinic Progress Note  PCP: JOSEPH Goodman    CC: Hypothyroidism    HPI:  Sophy Esquivel is a 32 y.o. old patient who comes in today for routine follow up. Patient is new to me today, previously saw Dr. Baez. She was diagnosed with hypothyroidism about 2-3 years ago. Currently she is on Synthroid 112 µg daily. She reports compliance with medications. She also has history of thyroid nodules, subcentimeter in size. She has family history of thyroid cancer, heart mother had thyroid cancer in her 20s. Last thyroid ultrasound was done in 2015 which shows small thyroid nodules.    ROS:  Constitutional: No unintentional weight loss  Endo: Denies excessive thirst or frequent urination    Past Medical History:  Patient Active Problem List    Diagnosis Date Noted   • Hot flashes 05/02/2017   • Dyspepsia 02/15/2017   • Family history of thyroid cancer 05/21/2015   • Hypothyroidism 03/30/2015   • Migraine 03/30/2015       Medications:    Current outpatient prescriptions:   •  SYNTHROID 112 MCG Tab, Take 1 Tab by mouth Every morning on an empty stomach., Disp: 90 Tab, Rfl: 0  •  pantoprazole (PROTONIX) 40 MG Tablet Delayed Response, Take 1 Tab by mouth every day., Disp: 30 Tab, Rfl: 11    Labs:   Results for SOPHY ESQUIVEL (MRN [id number]) as of 6/13/2017 14:05   Ref. Range 1/6/2017 11:43 5/16/2017 12:17   TSH Latest Ref Range: 0.300-3.700 uIU/mL 0.640 1.160   Free T-4 Latest Ref Range: 0.53-1.43 ng/dL 1.30 1.04     Physical Examination:  Vital signs: BP 90/60 mmHg  Pulse 92  Ht 1.651 m (5' 5\")  Wt 53.071 kg (117 lb)  BMI 19.47 kg/m2  SpO2 96%  LMP 06/13/2017  General: No apparent distress, cooperative  Eyes: No scleral icterus, no discharge, normal eyelids  Neck: No abnormal masses on inspection   Resp: Normal effort, clear to auscultation bilaterally  CVS: Regular rate and rhythm, S1 S2 normal, no murmur  Extremities: No lower extremity edema  Musculoskeletal: Normal gait  Skin: " No rash on visible skin  Psych: Alert and oriented, normal mood and affect    Assessment and Plan:    1. Acquired hypothyroidism  · Most recent TSH is 1.16, continue on current regimen of Synthroid 112 µg daily  · Repeat labs in 6 months or sooner if symptomatic  - TSH; Future  - FREE THYROXINE; Future  - TSH; Future  - FREE THYROXINE; Future    2. Multiple thyroid nodules  · Thyroid ultrasound in 2015 showed multiple subcentimeter thyroid nodules  · She has family history of thyroid cancer (mother)  · We will repeat thyroid ultrasound now  - US-SOFT TISSUES OF HEAD - NECK; Future    Return in about 6 months (around 12/13/2017).    Thank you for allowing me to participate in the care of this patient.    Jamar Doty M.D.    CC:   Parmjit Arteaga, A.P.N.    This note was created using voice recognition software (Dragon). The accuracy of the dictation is limited by the abilities of the software. I have reviewed the note prior to signing, however some errors in grammar and context are still possible. If you have any questions related to this note please do not hesitate to contact our office.

## 2017-06-23 ENCOUNTER — OFFICE VISIT (OUTPATIENT)
Dept: OTHER | Facility: IMAGING CENTER | Age: 32
End: 2017-06-23
Payer: COMMERCIAL

## 2017-06-23 ENCOUNTER — HOSPITAL ENCOUNTER (OUTPATIENT)
Dept: RADIOLOGY | Facility: MEDICAL CENTER | Age: 32
End: 2017-06-23
Attending: INTERNAL MEDICINE
Payer: COMMERCIAL

## 2017-06-23 DIAGNOSIS — R10.13 DYSPEPSIA: ICD-10-CM

## 2017-06-23 DIAGNOSIS — R23.2 HOT FLASHES: ICD-10-CM

## 2017-06-23 DIAGNOSIS — E04.2 MULTIPLE THYROID NODULES: ICD-10-CM

## 2017-06-23 PROCEDURE — 76536 US EXAM OF HEAD AND NECK: CPT

## 2017-06-23 PROCEDURE — 97811 ACUP 1/> W/O ESTIM EA ADD 15: CPT | Performed by: FAMILY MEDICINE

## 2017-06-23 PROCEDURE — 99213 OFFICE O/P EST LOW 20 MIN: CPT | Mod: 25 | Performed by: FAMILY MEDICINE

## 2017-06-23 PROCEDURE — 97813 ACUP 1/> W/ESTIM 1ST 15 MIN: CPT | Performed by: FAMILY MEDICINE

## 2017-06-23 NOTE — PROGRESS NOTES
[facility] Acupuncture Progress Note  [address] [place] [address]  Dept: [phone]      Patient Name: Roxane Toth   MRN: [id number]  YOB: 1985  PCP: JOSEPH Goodman  Date of Service: 6/23/2017  2:29 PM    CC Stomach burning   HPI Patient is a 31 yo female with increased stomach burning that seems to be returning after stopping Pantoprezole around 3 weeks ago with concerns over the long term effect of PPI.  Since she was 21 years old, she consistently having stomach burning sensation around 1-2 hours after dinner time which would be around 8-9 PM.  She has tried to modify her diet and it only improves a little with no significant lasting effects.  As she becomes more achy in her stomach it also precipitated as bloating and nausea.  Eventually headache.  She also has hypothyroidism and has never been to acupuncture before.    Since last tx she has experienced continue no burning or stomach ache.  Her hot flashes has been intermittent at night at much milder degree.   ROS Birthplace: Not asked  Color:  Season:  -handed  Scars:   PMH Past Medical History   Diagnosis Date   • Renal disorder 1/2011     kidney infection   • Heart burn    • Indigestion    • Thyroid disease      Past Surgical History   Procedure Laterality Date   • Elbow orif  1991     right elbow   • Tonsillectomy and adenoidectomy  as child   • Gastroscopy-endo  10/25/2013     Performed by Zohaib Felix M.D. at [facility]   • Egd with asp/bx  10/25/2013     Performed by Zohaib Felix M.D. at [facility] Social History     Social History   • Marital Status:      Spouse Name: N/A   • Number of Children: N/A   • Years of Education: N/A     Social History Main Topics   • Smoking status: Current Every Day Smoker -- 0.50 packs/day for 10 years     Types: Cigarettes   • Smokeless tobacco: Not on file      Comment: one pack every other day   •  Alcohol Use: 0.5 oz/week     1 Glasses of wine per week      Comment: twice a year   • Drug Use: No   • Sexual Activity:     Partners: Male     Birth Control/ Protection: Pill     Other Topics Concern   • Not on file     Social History Narrative      MEDS Current Outpatient Prescriptions on File Prior to Visit   Medication Sig Dispense Refill   • SYNTHROID 112 MCG Tab Take 1 Tab by mouth Every morning on an empty stomach. 90 Tab 0   • pantoprazole (PROTONIX) 40 MG Tablet Delayed Response Take 1 Tab by mouth every day. 30 Tab 11     No current facility-administered medications on file prior to visit.      ALLERGIES Allergies   Allergen Reactions   • Iodine Anaphylaxis and Hives      PE Praveen Exam: Stomach Qi: (+) pecking radial pulse  (+) Oketsu, (+) Immune,   (R) Adrenal - B/LKid16 St9 DaiMai ASIS Kid2         Assessment Eastern Liver/blood stagnation, Stomach qi imbalance, Immune imbalance, Adrenal exhaustion.    Western Encounter Diagnoses   Name Primary?   • Dyspepsia    • Hot flashes                   Plan Set 1: Left (Lv4, Lu5)  Set 2: B/L LI10-11 area  Set 3: L (SI 7 --> SI 13, TW 5, SP 6 --> SP 9, LR 4.5 ++> 8), R (HT 5 --> HT 3, PC 6 ST 39 --> ST 36, GB 36 ++> 34, Ht 5)  Set 4: Stomach area (B/L Er pung 2 ji),      Damion Fisher D.O.     Total face to face time was 20 minutes with more than 15 minutes were spent discussing with the patient about her condition which did not include procedure time. >50% of the face to face time was spent in counseling and coordination. Topics discussed included:   Continue use yuki tea in the morning for the next 2 weeks and can continue her sleep hygiene with no blue light exposure 2 hours before sleep.  Explanation of mechanism of smooth muscle spasm with increased sympathetic/parasympathatic tone  Total acupuncture treatment time = 45 minutes

## 2017-06-23 NOTE — MR AVS SNAPSHOT
Roxane Toth   2017 2:30 PM   Office Visit   MRN: [id number]    Department:  Acupuncture Med   Dept Phone:  [phone]    Description:  Female : 1985   Provider:  Damion Fisher D.O.           Allergies as of 2017     Allergen Noted Reactions    Iodine 2013   Anaphylaxis, Hives      You were diagnosed with     Dyspepsia   [994781]       Hot flashes   [585642]         Vital Signs     Last Menstrual Period Smoking Status                2017 Current Every Day Smoker          Basic Information     Date Of Birth Sex Race Ethnicity Preferred Language    1985 Female White Non- English      Your appointments     2017  4:30 PM   US [id number] with VISTA US 2   [facility]AG[facility] ([place])    [address] [address]   [phone]            2017  3:00 PM   ACUPUNCTURE 30 with Damion Fisher D.O.   [facility] (--)    [address] [facility]  [facility] [id number] [phone]            Dec 12, 2017 12:40 PM   Established Patient with Jamar Doty M.D.   [facility]    [address] [facility], [address]  [facility] [address] [phone]           You will be receiving a confirmation call a few days before your appointment from our automated call confirmation system.              Problem List              ICD-10-CM Priority Class Noted - Resolved    Hypothyroidism E03.9   3/30/2015 - Present    Migraine G43.909   3/30/2015 - Present    Family history of thyroid cancer Z80.8   2015 - Present    Dyspepsia R10.13   2/15/2017 - Present    Hot flashes R23.2   2017 - Present      Health Maintenance        Date Due Completion Dates    PAP SMEAR 2/3/2019 2/3/2016, 2015 (N/S)    [facility]    IMM DTaP/Tdap/Td Vaccine (2 - Td) 2025            Current Immunizations     Influenza TIV (IM) 2016,  9/28/2013, 10/8/2012    Influenza Vaccine Quad Inj (Pf) 10/13/2014    Influenza Vaccine Quad Inj (Preserved) 10/27/2015    Pneumococcal polysaccharide vaccine (PPSV-23) 9/28/2013    Tdap Vaccine 12/11/2015    Tuberculin Skin Test 4/17/2013 12:00 PM, 5/23/2012 12:20 PM, 5/1/2012  5:06 PM, 4/23/2012  4:00 PM      Below and/or attached are the medications your provider expects you to take. Review all of your home medications and newly ordered medications with your provider and/or pharmacist. Follow medication instructions as directed by your provider and/or pharmacist. Please keep your medication list with you and share with your provider. Update the information when medications are discontinued, doses are changed, or new medications (including over-the-counter products) are added; and carry medication information at all times in the event of emergency situations     Allergies:  IODINE - Anaphylaxis,Hives               Medications  Valid as of: June 23, 2017 -  3:25 PM    Generic Name Brand Name Tablet Size Instructions for use    Levothyroxine Sodium (Tab) SYNTHROID 112 MCG Take 1 Tab by mouth Every morning on an empty stomach.        Pantoprazole Sodium (Tablet Delayed Response) PROTONIX 40 MG Take 1 Tab by mouth every day.        .                 Medicines prescribed today were sent to:     [facility]ACY #[id number] - [place] - [address]    [address] [place] [address]    Phone: [phone] Fax: [phone]    Open 24 Hours?: No      Medication refill instructions:       If your prescription bottle indicates you have medication refills left, it is not necessary to call your provider’s office. Please contact your pharmacy and they will refill your medication.    If your prescription bottle indicates you do not have any refills left, you may request refills at any time through one of the following ways: The online [employer] system (except Urgent Care), by calling your provider’s office, or by asking your  pharmacy to contact your provider’s office with a refill request. Medication refills are processed only during regular business hours and may not be available until the next business day. Your provider may request additional information or to have a follow-up visit with you prior to refilling your medication.   *Please Note: Medication refills are assigned a new Rx number when refilled electronically. Your pharmacy may indicate that no refills were authorized even though a new prescription for the same medication is available at the pharmacy. Please request the medicine by name with the pharmacy before contacting your provider for a refill.        Instructions    Have encouraged the patient to rest after the acupuncture session today - taking naps or going to sleep early as necessary.  Increase intake of water and refrain from strenuous activities.  Patient may expect to feel transient worsening of symptoms, but this should resolve to benefit in the next day or two after treatment.    The side effects of Acupuncture needle insertion include: minor bruising, bleeding, or pain at the site of needle insertion.  If more worrisome symptoms, such as continued bleeding, severe bruising, or continue pain or altered sensation persist, please contact [facility] office @ [phone]           3TEN8 Access Code: Activation code not generated  Current 3TEN8 Status: Active          Quit Tobacco Information     Do you want to quit using tobacco?    Quitting tobacco decreases risks of cancer, heart and lung disease, increases life expectancy, improves sense of taste and smell, and increases spending money, among other benefits.    If you are thinking about quitting, we can help.  • [facility] Quit Tobacco Program: 186.922.8315  o Program occurs weekly for four weeks and includes pharmacist consultation on products to support quitting smoking or chewing tobacco. A provider referral is needed for pharmacist  consultation.  • Tobacco Users Help Hotline: 6-800-QUIT-NOW (659-8212) or https://[place].quitlogix.org/  o Free, confidential telephone and online coaching for [place] residents. Sessions are designed on a schedule that is convenient for you. Eligible clients receive free nicotine replacement therapy.  • Nationally: www.smokefree.gov  o Information and professional assistance to support both immediate and long-term needs as you become, and remain, a non-smoker. Smokefree.gov allows you to choose the help that best fits your needs.

## 2017-07-28 ENCOUNTER — OFFICE VISIT (OUTPATIENT)
Dept: OTHER | Facility: IMAGING CENTER | Age: 32
End: 2017-07-28
Payer: COMMERCIAL

## 2017-07-28 DIAGNOSIS — R10.13 DYSPEPSIA: ICD-10-CM

## 2017-07-28 DIAGNOSIS — G43.001 MIGRAINE WITHOUT AURA AND WITH STATUS MIGRAINOSUS, NOT INTRACTABLE: ICD-10-CM

## 2017-07-28 DIAGNOSIS — R23.2 HOT FLASHES: ICD-10-CM

## 2017-07-28 PROCEDURE — 97811 ACUP 1/> W/O ESTIM EA ADD 15: CPT | Performed by: FAMILY MEDICINE

## 2017-07-28 PROCEDURE — 99213 OFFICE O/P EST LOW 20 MIN: CPT | Mod: 25 | Performed by: FAMILY MEDICINE

## 2017-07-28 PROCEDURE — 97813 ACUP 1/> W/ESTIM 1ST 15 MIN: CPT | Performed by: FAMILY MEDICINE

## 2017-07-28 NOTE — MR AVS SNAPSHOT
Roxane Toth   2017 3:00 PM   Office Visit   MRN: [id number]    Department:  Acupuncture Med   Dept Phone:  [phone]    Description:  Female : 1985   Provider:  Damion Fisher D.O.           Allergies as of 2017     Allergen Noted Reactions    Iodine 2013   Anaphylaxis, Hives      You were diagnosed with     Dyspepsia   [320978]       Hot flashes   [846611]       Migraine without aura and with status migrainosus, not intractable   [273909]         Vital Signs     Smoking Status                   Current Every Day Smoker           Basic Information     Date Of Birth Sex Race Ethnicity Preferred Language    1985 Female White Non- English      Your appointments     Sep 08, 2017  3:00 PM   ACUPUNCTURE 30 with Damion Fisher D.O.   [facility] (--)    [address] [facility].  [facility] [id number] [phone]            Dec 12, 2017 12:40 PM   Established Patient with Jamar Doty M.D.   [facility]    [address]  [facility] [id number] [phone]           You will be receiving a confirmation call a few days before your appointment from our automated call confirmation system.              Problem List              ICD-10-CM Priority Class Noted - Resolved    Hypothyroidism E03.9   3/30/2015 - Present    Migraine G43.909   3/30/2015 - Present    Family history of thyroid cancer Z80.8   2015 - Present    Dyspepsia R10.13   2/15/2017 - Present    Hot flashes R23.2   2017 - Present      Health Maintenance        Date Due Completion Dates    IMM INFLUENZA (1) 2016, 10/27/2015, 10/13/2014, 2013, 10/8/2012    PAP SMEAR 2/3/2019 2/3/2016, 2015 (N/S)    Override on 2015: (N/S) ([facility])    IMM DTaP/Tdap/Td Vaccine (2 - Td) 2025            Current Immunizations     Influenza TIV (IM) 2016, 2013,  10/8/2012    Influenza Vaccine Quad Inj (Pf) 10/13/2014    Influenza Vaccine Quad Inj (Preserved) 10/27/2015    Pneumococcal polysaccharide vaccine (PPSV-23) 9/28/2013    Tdap Vaccine 12/11/2015    Tuberculin Skin Test 4/17/2013 12:00 PM, 5/23/2012 12:20 PM, 5/1/2012  5:06 PM, 4/23/2012  4:00 PM      Below and/or attached are the medications your provider expects you to take. Review all of your home medications and newly ordered medications with your provider and/or pharmacist. Follow medication instructions as directed by your provider and/or pharmacist. Please keep your medication list with you and share with your provider. Update the information when medications are discontinued, doses are changed, or new medications (including over-the-counter products) are added; and carry medication information at all times in the event of emergency situations     Allergies:  IODINE - Anaphylaxis,Hives               Medications  Valid as of: July 28, 2017 -  4:07 PM    Generic Name Brand Name Tablet Size Instructions for use    Levothyroxine Sodium (Tab) SYNTHROID 112 MCG Take 1 Tab by mouth Every morning on an empty stomach.        Pantoprazole Sodium (Tablet Delayed Response) PROTONIX 40 MG Take 1 Tab by mouth every day.        .                 Medicines prescribed today were sent to:     [facility]/PHARMACY #[id number] - [place] - [address]    [address] [address]    Phone: [phone] Fax: [phone]    Open 24 Hours?: No      Medication refill instructions:       If your prescription bottle indicates you have medication refills left, it is not necessary to call your provider’s office. Please contact your pharmacy and they will refill your medication.    If your prescription bottle indicates you do not have any refills left, you may request refills at any time through one of the following ways: The online [employer] system (except Urgent Care), by calling your provider’s office, or by asking your pharmacy to  contact your provider’s office with a refill request. Medication refills are processed only during regular business hours and may not be available until the next business day. Your provider may request additional information or to have a follow-up visit with you prior to refilling your medication.   *Please Note: Medication refills are assigned a new Rx number when refilled electronically. Your pharmacy may indicate that no refills were authorized even though a new prescription for the same medication is available at the pharmacy. Please request the medicine by name with the pharmacy before contacting your provider for a refill.        Instructions    Have encouraged the patient to rest after the acupuncture session today - taking naps or going to sleep early as necessary.  Increase intake of water and refrain from strenuous activities.  Patient may expect to feel transient worsening of symptoms, but this should resolve to benefit in the next day or two after treatment.    The side effects of Acupuncture needle insertion include: minor bruising, bleeding, or pain at the site of needle insertion.  If more worrisome symptoms, such as continued bleeding, severe bruising, or continue pain or altered sensation persist, please contact [facility] office @ [phone]           imgix Access Code: Activation code not generated  Current imgix Status: Active          Quit Tobacco Information     Do you want to quit using tobacco?    Quitting tobacco decreases risks of cancer, heart and lung disease, increases life expectancy, improves sense of taste and smell, and increases spending money, among other benefits.    If you are thinking about quitting, we can help.  • [facility] Quit Tobacco Program: 824.466.8155  o Program occurs weekly for four weeks and includes pharmacist consultation on products to support quitting smoking or chewing tobacco. A provider referral is needed for pharmacist  consultation.  • Tobacco Users Help Hotline: 5-800-QUIT-NOW (884-4514) or https://[place].quitlogix.org/  o Free, confidential telephone and online coaching for [place] residents. Sessions are designed on a schedule that is convenient for you. Eligible clients receive free nicotine replacement therapy.  • Nationally: www.smokefree.gov  o Information and professional assistance to support both immediate and long-term needs as you become, and remain, a non-smoker. Smokefree.gov allows you to choose the help that best fits your needs.

## 2017-07-28 NOTE — PATIENT INSTRUCTIONS
Have encouraged the patient to rest after the acupuncture session today - taking naps or going to sleep early as necessary.  Increase intake of water and refrain from strenuous activities.  Patient may expect to feel transient worsening of symptoms, but this should resolve to benefit in the next day or two after treatment.    The side effects of Acupuncture needle insertion include: minor bruising, bleeding, or pain at the site of needle insertion.  If more worrisome symptoms, such as continued bleeding, severe bruising, or continue pain or altered sensation persist, please contact [facility] office @ [phone]

## 2017-07-28 NOTE — PROGRESS NOTES
[facility]uncture Progress Note  [address] [place] [address]  Dept: [phone]      Patient Name: Roxane Toth   MRN: [id number]  YOB: 1985  PCP: JOSEPH Goodman  Date of Service: 7/28/2017  2:59 PM    CC Stomach burning   HPI Patient is a 33 yo female with increased stomach burning that seems to be returning after stopping Pantoprezole around 3 weeks ago with concerns over the long term effect of PPI.  Since she was 21 years old, she consistently having stomach burning sensation around 1-2 hours after dinner time which would be around 8-9 PM.  She has tried to modify her diet and it only improves a little with no significant lasting effects.  As she becomes more achy in her stomach it also precipitated as bloating and nausea.  Eventually headache.  She also has hypothyroidism and has never been to acupuncture before.    Since last tx she has experienced continue 3 brief stomach ache that is much milder in degree.  Her hot flashes has been resolved.   ROS Birthplace: Not asked  Color:  Season:  -handed  Scars:   PMH Past Medical History   Diagnosis Date   • Renal disorder 1/2011     kidney infection   • Heart burn    • Indigestion    • Thyroid disease      Past Surgical History   Procedure Laterality Date   • Elbow orif  1991     right elbow   • Tonsillectomy and adenoidectomy  as child   • Gastroscopy-endo  10/25/2013     Performed by Zohaib Felix M.D. at [facility]   • Egd with asp/bx  10/25/2013     Performed by Zohaib Felix M.D. at [facility] Social History     Social History   • Marital Status:      Spouse Name: N/A   • Number of Children: N/A   • Years of Education: N/A     Social History Main Topics   • Smoking status: Current Every Day Smoker -- 0.50 packs/day for 10 years     Types: Cigarettes   • Smokeless tobacco: Not on file      Comment: one pack every other day   • Alcohol Use:  0.5 oz/week     1 Glasses of wine per week      Comment: twice a year   • Drug Use: No   • Sexual Activity:     Partners: Male     Birth Control/ Protection: Pill     Other Topics Concern   • Not on file     Social History Narrative      MEDS Current Outpatient Prescriptions on File Prior to Visit   Medication Sig Dispense Refill   • SYNTHROID 112 MCG Tab Take 1 Tab by mouth Every morning on an empty stomach. 90 Tab 0   • pantoprazole (PROTONIX) 40 MG Tablet Delayed Response Take 1 Tab by mouth every day. 30 Tab 11     No current facility-administered medications on file prior to visit.      ALLERGIES Allergies   Allergen Reactions   • Iodine Anaphylaxis and Hives      PE Praveen Exam: Stomach Qi: (+) pecking radial pulse  (+) Oketsu, (+) Immune,   (R) Adrenal - B/LKid16 St9 DaiMai ASIS Kid2         Assessment Eastern Liver/blood stagnation, Stomach qi imbalance, Immune imbalance, Adrenal exhaustion.    Western Encounter Diagnoses   Name Primary?   • Dyspepsia    • Hot flashes    • Migraine without aura and with status migrainosus, not intractable                   Plan Set 1: Left (Lv4, Lu5)  Set 2: B/L LI10-11 area  Set 3: L (SI 7 --> SI 13, TW 5, SP 6 ++> SP 9, LR 4.5 --> 8), R (HT 5 --> HT 3, PC 6 ST 39 ++> ST 36, GB 36 --> 34, Ht 5)  Set 4: Stomach area (B/L Er pung 2 ji),      Damion Fisher D.O.     Total face to face time was 20 minutes with more than 15 minutes were spent discussing with the patient about her condition which did not include procedure time. >50% of the face to face time was spent in counseling and coordination. Topics discussed included:   Continue use yuki tea in the morning for the next 2 weeks and can continue her sleep hygiene with no blue light exposure 2 hours before sleep.  Explanation of mechanism of smooth muscle spasm with increased sympathetic/parasympathatic tone  Total acupuncture treatment time = 45 minutes

## 2017-09-15 ENCOUNTER — OFFICE VISIT (OUTPATIENT)
Dept: URGENT CARE | Facility: CLINIC | Age: 32
End: 2017-09-15
Payer: COMMERCIAL

## 2017-09-15 ENCOUNTER — APPOINTMENT (OUTPATIENT)
Dept: RADIOLOGY | Facility: IMAGING CENTER | Age: 32
End: 2017-09-15
Attending: NURSE PRACTITIONER
Payer: COMMERCIAL

## 2017-09-15 VITALS
HEART RATE: 120 BPM | OXYGEN SATURATION: 97 % | HEIGHT: 65 IN | WEIGHT: 115 LBS | BODY MASS INDEX: 19.16 KG/M2 | DIASTOLIC BLOOD PRESSURE: 60 MMHG | RESPIRATION RATE: 16 BRPM | TEMPERATURE: 97.8 F | SYSTOLIC BLOOD PRESSURE: 120 MMHG

## 2017-09-15 DIAGNOSIS — R07.89 ACUTE CHEST WALL PAIN: Primary | ICD-10-CM

## 2017-09-15 DIAGNOSIS — R07.89 ACUTE CHEST WALL PAIN: ICD-10-CM

## 2017-09-15 PROCEDURE — 71020 DX-CHEST-2 VIEWS: CPT | Mod: 26 | Performed by: NURSE PRACTITIONER

## 2017-09-15 PROCEDURE — 99214 OFFICE O/P EST MOD 30 MIN: CPT | Performed by: NURSE PRACTITIONER

## 2017-09-15 RX ORDER — HYDROCODONE BITARTRATE AND ACETAMINOPHEN 5; 325 MG/1; MG/1
1-2 TABLET ORAL EVERY 6 HOURS PRN
Qty: 20 TAB | Refills: 0 | Status: SHIPPED | OUTPATIENT
Start: 2017-09-15 | End: 2017-12-19

## 2017-09-15 ASSESSMENT — ENCOUNTER SYMPTOMS
CHILLS: 0
SHORTNESS OF BREATH: 0
MYALGIAS: 0
COUGH: 0
NAUSEA: 0
FEVER: 0
ABDOMINAL PAIN: 0
PALPITATIONS: 0
VOMITING: 0

## 2017-09-16 NOTE — PROGRESS NOTES
"Subjective:      Roxane Toth is a 32 y.o. female who presents with Rib Injury (pt threw up last week and is having rib pain since Left side )            Medications, Allergies and Prior Medical Hx reviewed and updated in [facility].with patient/family today     Pt states she had food poisoning last week while vomiting she developed left anterior chest wall pain. The pain is sharp 6/10 with cough, deep breath and movements. She has not taken anything for the pain.  She denies shortness of breath or cough.       Rib Injury   This is a new problem. The current episode started 1 to 4 weeks ago (10 days ago). The problem occurs constantly. The problem has been unchanged. Associated symptoms include chest pain. Pertinent negatives include no abdominal pain, chills, congestion, coughing, fever, myalgias, nausea, rash or vomiting. The symptoms are aggravated by sneezing and coughing. She has tried nothing for the symptoms. The treatment provided no relief.       Review of Systems   Constitutional: Negative for chills and fever.   HENT: Negative for congestion.    Respiratory: Negative for cough and shortness of breath.    Cardiovascular: Positive for chest pain. Negative for palpitations.   Gastrointestinal: Negative for abdominal pain, nausea and vomiting.   Musculoskeletal: Negative for myalgias.   Skin: Negative for rash.          Objective:     /60   Pulse (!) 120   Temp 36.6 °C (97.8 °F)   Resp 16   Ht 1.651 m (5' 5\")   Wt 52.2 kg (115 lb)   SpO2 97%   BMI 19.14 kg/m²      Physical Exam   Constitutional: She appears well-developed and well-nourished. No distress.   HENT:   Head: Normocephalic and atraumatic.   Eyes: Conjunctivae are normal. Pupils are equal, round, and reactive to light.   Neck: Neck supple.   Cardiovascular: Normal rate, regular rhythm and normal heart sounds.    Pulmonary/Chest: Effort normal and breath sounds normal. No respiratory distress. She exhibits tenderness. She exhibits " no mass, no laceration, no crepitus, no edema, no deformity, no swelling and no retraction.       No bony crepitus, sub q air, ecchymosis or erythema. No retractions.    Neurological: She is alert.   Awake, alert, answering questions appropriately, moving all extremeties   Skin: Skin is warm and dry. Capillary refill takes less than 2 seconds. No rash noted.   Psychiatric: She has a normal mood and affect. Her behavior is normal.   Vitals reviewed.              Assessment/Plan:       1. Acute chest wall pain  DX-CHEST-2 VIEWS    hydrocodone-acetaminophen (NORCO) 5-325 MG Tab per tablet     Do not drink alcohol or operate machinery with this medication  Pt reviewed on [place]  Aware,  no remarkable controlled substance prescription documentation noted    Xray of chest -  Reviewed film and radiology interpretation:     Cough, Deep breath, q 1 hour when awake,   Pt will go to the ER for worsening or changing symptoms as discussed; shortness of breath, productive cough, fevers or other concerns  Follow-up with your primary care provider next week for recheck  Discharge instructions discussed with pt/family who verbalize understanding and agreement with poc

## 2017-09-16 NOTE — PATIENT INSTRUCTIONS
Chest Wall Pain  Chest wall pain is pain in or around the bones and muscles of your chest. It may take up to 6 weeks to get better. It may take longer if you must stay physically active in your work and activities.   CAUSES   Chest wall pain may happen on its own. However, it may be caused by:  · A viral illness like the flu.  · Injury.  · Coughing.  · Exercise.  · Arthritis.  · Fibromyalgia.  · Shingles.  HOME CARE INSTRUCTIONS   · Avoid overtiring physical activity. Try not to strain or perform activities that cause pain. This includes any activities using your chest or your abdominal and side muscles, especially if heavy weights are used.  · Put ice on the sore area.  ¨ Put ice in a plastic bag.  ¨ Place a towel between your skin and the bag.  ¨ Leave the ice on for 15-20 minutes per hour while awake for the first 2 days.  · Only take over-the-counter or prescription medicines for pain, discomfort, or fever as directed by your caregiver.  SEEK IMMEDIATE MEDICAL CARE IF:   · Your pain increases, or you are very uncomfortable.  · You have a fever.  · Your chest pain becomes worse.  · You have new, unexplained symptoms.  · You have nausea or vomiting.  · You feel sweaty or lightheaded.  · You have a cough with phlegm (sputum), or you cough up blood.  MAKE SURE YOU:   · Understand these instructions.  · Will watch your condition.  · Will get help right away if you are not doing well or get worse.     This information is not intended to replace advice given to you by your health care provider. Make sure you discuss any questions you have with your health care provider.     Document Released: 12/18/2006 Document Revised: 03/11/2013 Document Reviewed: 03/14/2016  [employer] Patient Education ©2016 [employer]

## 2017-09-18 RX ORDER — LEVOTHYROXINE SODIUM 112 MCG
TABLET ORAL
Qty: 90 TAB | Refills: 1 | Status: SHIPPED | OUTPATIENT
Start: 2017-09-18 | End: 2018-03-17 | Stop reason: SDUPTHER

## 2017-09-19 RX ORDER — LEVOTHYROXINE SODIUM 112 MCG
TABLET ORAL
Qty: 90 TAB | Refills: 2 | Status: SHIPPED | OUTPATIENT
Start: 2017-09-19 | End: 2017-12-19

## 2017-09-25 ENCOUNTER — HOSPITAL ENCOUNTER (OUTPATIENT)
Dept: LAB | Facility: MEDICAL CENTER | Age: 32
End: 2017-09-25
Payer: COMMERCIAL

## 2017-09-25 LAB
BDY FAT % MEASURED: 21.1 %
BP DIAS: 53 MMHG
BP SYS: 113 MMHG
CHOLEST SERPL-MCNC: 161 MG/DL (ref 100–199)
DIABETES HTDIA: NO
EVENT NAME HTEVT: NORMAL
FASTING HTFAS: YES
GLUCOSE SERPL-MCNC: 92 MG/DL (ref 65–99)
HDLC SERPL-MCNC: 66 MG/DL
HYPERTENSION HTHYP: NO
LDLC SERPL CALC-MCNC: 87 MG/DL
SCREENING LOC CITY HTCIT: NORMAL
SCREENING LOC STATE HTSTA: NORMAL
SCREENING LOCATION HTLOC: NORMAL
SMOKING HTSMO: YES
SUBSCRIBER ID HTSID: NORMAL
TRIGL SERPL-MCNC: 41 MG/DL (ref 0–149)

## 2017-09-25 PROCEDURE — 36415 COLL VENOUS BLD VENIPUNCTURE: CPT

## 2017-09-25 PROCEDURE — 82947 ASSAY GLUCOSE BLOOD QUANT: CPT

## 2017-09-25 PROCEDURE — 80061 LIPID PANEL: CPT

## 2017-09-25 PROCEDURE — S5190 WELLNESS ASSESSMENT BY NONPH: HCPCS

## 2017-09-26 ENCOUNTER — EH NON-PROVIDER (OUTPATIENT)
Dept: OCCUPATIONAL MEDICINE | Facility: CLINIC | Age: 32
End: 2017-09-26

## 2017-09-26 ENCOUNTER — APPOINTMENT (OUTPATIENT)
Dept: OCCUPATIONAL MEDICINE | Facility: CLINIC | Age: 32
End: 2017-09-26

## 2017-09-26 DIAGNOSIS — Z29.89 NEED FOR ISOLATION: ICD-10-CM

## 2017-09-26 PROCEDURE — 94010 BREATHING CAPACITY TEST: CPT | Performed by: PREVENTIVE MEDICINE

## 2017-09-26 PROCEDURE — 94375 RESPIRATORY FLOW VOLUME LOOP: CPT | Performed by: PREVENTIVE MEDICINE

## 2017-10-06 ENCOUNTER — OFFICE VISIT (OUTPATIENT)
Dept: MEDICAL GROUP | Facility: MEDICAL CENTER | Age: 32
End: 2017-10-06
Payer: COMMERCIAL

## 2017-10-06 ENCOUNTER — OFFICE VISIT (OUTPATIENT)
Dept: OTHER | Facility: IMAGING CENTER | Age: 32
End: 2017-10-06
Payer: COMMERCIAL

## 2017-10-06 VITALS
DIASTOLIC BLOOD PRESSURE: 62 MMHG | OXYGEN SATURATION: 96 % | WEIGHT: 117 LBS | HEART RATE: 82 BPM | RESPIRATION RATE: 16 BRPM | TEMPERATURE: 98.7 F | BODY MASS INDEX: 19.49 KG/M2 | SYSTOLIC BLOOD PRESSURE: 108 MMHG | HEIGHT: 65 IN

## 2017-10-06 DIAGNOSIS — R23.2 HOT FLASHES: ICD-10-CM

## 2017-10-06 DIAGNOSIS — G43.001 MIGRAINE WITHOUT AURA AND WITH STATUS MIGRAINOSUS, NOT INTRACTABLE: ICD-10-CM

## 2017-10-06 DIAGNOSIS — R07.89 CHEST WALL PAIN: ICD-10-CM

## 2017-10-06 DIAGNOSIS — R10.13 DYSPEPSIA: ICD-10-CM

## 2017-10-06 PROCEDURE — 97811 ACUP 1/> W/O ESTIM EA ADD 15: CPT | Performed by: FAMILY MEDICINE

## 2017-10-06 PROCEDURE — 97813 ACUP 1/> W/ESTIM 1ST 15 MIN: CPT | Performed by: FAMILY MEDICINE

## 2017-10-06 PROCEDURE — 99213 OFFICE O/P EST LOW 20 MIN: CPT | Mod: 25 | Performed by: FAMILY MEDICINE

## 2017-10-06 PROCEDURE — 99213 OFFICE O/P EST LOW 20 MIN: CPT | Performed by: NURSE PRACTITIONER

## 2017-10-06 NOTE — PROGRESS NOTES
[facility] Acupuncture Progress Note  [address] [place] [address]  Dept: [phone]      Patient Name: Roxane Toth   MRN: [id number]  YOB: 1985  PCP: JOSEPH Goodman  Date of Service: 10/6/2017  2:27 PM    CC Stomach burning   HPI Patient is a 33 yo female with increased stomach burning that seems to be returning after stopping Pantoprezole around 3 weeks ago with concerns over the long term effect of PPI.  Since she was 21 years old, she consistently having stomach burning sensation around 1-2 hours after dinner time which would be around 8-9 PM.  She has tried to modify her diet and it only improves a little with no significant lasting effects.  As she becomes more achy in her stomach it also precipitated as bloating and nausea.  Eventually headache.  She also has hypothyroidism and has never been to acupuncture before.    Since last tx she has experienced continue 1 brief stomach ache that is much milder in degree.  Her hot flashes has been 1 time, but daily frontal headache including today.   ROS Birthplace: Not asked  Color:  Season:  -handed  Scars:   PMH Past Medical History:   Diagnosis Date   • Renal disorder 1/2011    kidney infection   • Heart burn    • Indigestion    • Thyroid disease      Past Surgical History:   Procedure Laterality Date   • GASTROSCOPY-ENDO  10/25/2013    Performed by Zohaib Felix M.D. at [facility]   • EGD WITH ASP/BX  10/25/2013    Performed by Zohaib Felix M.D. at [facility]   • ELBOW ORIF  1991    right elbow   • TONSILLECTOMY AND ADENOIDECTOMY  as child       Social History     Social History   • Marital status:      Spouse name: N/A   • Number of children: N/A   • Years of education: N/A     Social History Main Topics   • Smoking status: Current Every Day Smoker     Packs/day: 0.50     Years: 10.00     Types: Cigarettes   • Smokeless tobacco: Never Used       Comment: one pack every other day   • Alcohol use 0.5 oz/week     1 Glasses of wine per week      Comment: twice a year   • Drug use: No   • Sexual activity: Yes     Partners: Male     Birth control/ protection: Pill     Other Topics Concern   • Not on file     Social History Narrative   • No narrative on file      MEDS Current Outpatient Prescriptions on File Prior to Visit   Medication Sig Dispense Refill   • SYNTHROID 112 MCG Tab TAKE 1 TABLET BY MOUTH EVERY MORNING ON AN EMPTY STOMACH. 90 Tab 2   • SYNTHROID 112 MCG Tab TAKE 1 TABLET BY MOUTH EVERY MORNING ON AN EMPTY STOMACH. 90 Tab 1   • hydrocodone-acetaminophen (NORCO) 5-325 MG Tab per tablet Take 1-2 Tabs by mouth every 6 hours as needed. 20 Tab 0   • pantoprazole (PROTONIX) 40 MG Tablet Delayed Response Take 1 Tab by mouth every day. 30 Tab 11     No current facility-administered medications on file prior to visit.       ALLERGIES Allergies   Allergen Reactions   • Iodine Anaphylaxis and Hives      PE Praveen Exam: Stomach Qi: (+) pecking radial pulse  (+) Oketsu, (+) Immune,   (R) Adrenal - B/LKid16 St9 DaiMai ASIS Kid2         Assessment Eastern Liver/blood stagnation, Stomach qi imbalance, Immune imbalance, Adrenal exhaustion.    Western Encounter Diagnoses   Name Primary?   • Hot flashes    • Migraine without aura and with status migrainosus, not intractable                   Plan Set 1: Left (Lv4, Lu5)  Set 2: B/L LI10-11 area  Set 3: L (SI 7 --> SI 13, TW 5, SP 6 ++> SP 9, KD 7 --> KD 10), R (HT 5 --> HT 3, PC 6 ST 39 ++> ST 36, BL 59 --> BL 40)  Set 4: Stomach area (B/L Er pung 2 ji), B/L fertility area     Damion Fisher D.O.     Total face to face time was 20 minutes with more than 15 minutes were spent discussing with the patient about her condition which did not include procedure time. >50% of the face to face time was spent in counseling and coordination. Topics discussed included:   Continue use ginger tea in the morning for the next 2 weeks and  can continue her sleep hygiene with no blue light exposure 2 hours before sleep.  Explanation of mechanism of smooth muscle spasm with increased sympathetic/parasympathatic tone  Total acupuncture treatment time = 45 minutes

## 2017-10-06 NOTE — PROGRESS NOTES
Subjective:      Roxane Toth is a 32 y.o. female who presents with Rib Injury (Left side)            HPI Roxane TothIs here today for continuing left chest wall pain.    Patient reports her symptoms started about 1-1/2 months ago with pain along her left ribs and chest wall. She states there was no trauma but it worsened after an episode of vomiting at home. She was seen in urgent care on 9/15/17 with these symptoms and a chest x-ray was done which came back normal. She states she has not reinjured the area but it is still bothering her.    Patient states that there is no pain at rest and it usually hurts with certain movements or with pressure on the left side. She states when she is home on the weekend she does not normally notice it but when she works during the weekdays and is bending and twisting she notices it more. She states there is no associated cough, hemoptysis, tachycardia, history of DVT, leg pain, history of malignancy, recent immobilization or surgery, or abdominal pain.        Past Medical History:   Diagnosis Date   • Renal disorder 1/2011    kidney infection   • Heart burn    • Indigestion    • Thyroid disease      Current Outpatient Prescriptions   Medication Sig Dispense Refill   • SYNTHROID 112 MCG Tab TAKE 1 TABLET BY MOUTH EVERY MORNING ON AN EMPTY STOMACH. 90 Tab 1   • pantoprazole (PROTONIX) 40 MG Tablet Delayed Response Take 1 Tab by mouth every day. 30 Tab 11   • SYNTHROID 112 MCG Tab TAKE 1 TABLET BY MOUTH EVERY MORNING ON AN EMPTY STOMACH. 90 Tab 2   • hydrocodone-acetaminophen (NORCO) 5-325 MG Tab per tablet Take 1-2 Tabs by mouth every 6 hours as needed. 20 Tab 0     No current facility-administered medications for this visit.      Social History   Substance Use Topics   • Smoking status: Current Every Day Smoker     Packs/day: 0.50     Years: 10.00     Types: Cigarettes   • Smokeless tobacco: Never Used      Comment: one pack every other day   • Alcohol use  "0.5 oz/week     1 Glasses of wine per week      Comment: twice a year     Family History   Problem Relation Age of Onset   • Cancer Mother      thyroid ca   • Other Father      healthy   • Stroke     • Diabetes     • Heart Disease     • Hypertension         Review of Systems   Cardiovascular: Positive for chest pain.   All other systems reviewed and are negative.         Objective:     /62   Pulse 82   Temp 37.1 °C (98.7 °F)   Resp 16   Ht 1.651 m (5' 5\")   Wt 53.1 kg (117 lb)   SpO2 96%   BMI 19.47 kg/m²      Physical Exam   Constitutional: She is oriented to person, place, and time. She appears well-developed and well-nourished. No distress.   HENT:   Head: Normocephalic and atraumatic.   Right Ear: External ear normal.   Left Ear: External ear normal.   Nose: Nose normal.   Eyes: Right eye exhibits no discharge. Left eye exhibits no discharge.   Neck: Normal range of motion. Neck supple. No thyromegaly present.   Cardiovascular: Normal rate, regular rhythm and normal heart sounds.  Exam reveals no gallop and no friction rub.    No murmur heard.  Pulmonary/Chest: Effort normal and breath sounds normal. She has no wheezes. She has no rales.   Musculoskeletal: She exhibits no edema or tenderness.   Mild tenderness to palpation over the left ribs and chest wall area. There is no ecchymosis. There is no tenderness throughout the abdomen.   Neurological: She is alert and oriented to person, place, and time. She displays normal reflexes.   Skin: Skin is warm and dry. No rash noted. She is not diaphoretic.   Psychiatric: She has a normal mood and affect. Her behavior is normal. Judgment and thought content normal.   Nursing note and vitals reviewed.              Assessment/Plan:     1. Chest wall pain  Patient's Wells criteria test is zero. She is not having any respiratory problems but her pain is been present for 1-1/2 months so I will do a CT of the chest. This could possibly be rib strain and may take a " while to fully heal. I recommended no heavy lifting or doing anything which causes pain but she should continue to breathe deeply and splint if necessary with coughing or sneezing. She will use ibuprofen for pain. She will go to the emergency room if symptoms become severe.  - CT-CHEST (THORAX) W/O; Future

## 2017-10-07 NOTE — PATIENT INSTRUCTIONS
Have encouraged the patient to rest after the acupuncture session today - taking naps or going to sleep early as necessary.  Increase intake of water and refrain from strenuous activities.  Patient may expect to feel transient worsening of symptoms, but this should resolve to benefit in the next day or two after treatment.    The side effects of Acupuncture needle insertion include: minor bruising, bleeding, or pain at the site of needle insertion.  If more worrisome symptoms, such as continued bleeding, severe bruising, or continue pain or altered sensation persist, please contact [facility] office @ [phone]

## 2017-10-24 ENCOUNTER — HOSPITAL ENCOUNTER (OUTPATIENT)
Dept: RADIOLOGY | Facility: MEDICAL CENTER | Age: 32
End: 2017-10-24
Attending: NURSE PRACTITIONER
Payer: COMMERCIAL

## 2017-10-24 DIAGNOSIS — R07.89 CHEST WALL PAIN: ICD-10-CM

## 2017-10-24 PROCEDURE — 71250 CT THORAX DX C-: CPT

## 2017-10-25 ENCOUNTER — OFFICE VISIT (OUTPATIENT)
Dept: OTHER | Facility: IMAGING CENTER | Age: 32
End: 2017-10-25
Payer: COMMERCIAL

## 2017-10-25 DIAGNOSIS — G43.109 MIGRAINE WITH AURA AND WITHOUT STATUS MIGRAINOSUS, NOT INTRACTABLE: ICD-10-CM

## 2017-10-25 DIAGNOSIS — E03.9 HYPOTHYROIDISM, UNSPECIFIED TYPE: ICD-10-CM

## 2017-10-25 DIAGNOSIS — R10.13 DYSPEPSIA: ICD-10-CM

## 2017-10-25 PROCEDURE — 99213 OFFICE O/P EST LOW 20 MIN: CPT | Mod: 25 | Performed by: FAMILY MEDICINE

## 2017-10-25 PROCEDURE — 97811 ACUP 1/> W/O ESTIM EA ADD 15: CPT | Performed by: FAMILY MEDICINE

## 2017-10-25 PROCEDURE — 97813 ACUP 1/> W/ESTIM 1ST 15 MIN: CPT | Performed by: FAMILY MEDICINE

## 2017-10-25 NOTE — PROGRESS NOTES
[facility] Acupuncture Progress Note  [address] [place] [address]  Dept: [phone]      Patient Name: Roxane Toth   MRN: [id number]  YOB: 1985  PCP: JOSEPH Goodman  Date of Service: 10/25/2017  7:44 AM    CC Stomach burning   HPI Patient is a 33 yo female with increased stomach burning that seems to be returning after stopping Pantoprezole around 3 weeks ago with concerns over the long term effect of PPI.  Since she was 21 years old, she consistently having stomach burning sensation around 1-2 hours after dinner time which would be around 8-9 PM.  She has tried to modify her diet and it only improves a little with no significant lasting effects.  As she becomes more achy in her stomach it also precipitated as bloating and nausea.  Eventually headache.  She also has hypothyroidism.    Since last tx she has experienced continue several stomach ache as well as hot flashes and frequent temporal headache. No flu-like illness in the last month.  Her last TSH was at May 2017   ROS Birthplace: Not asked  Color:  Season:  -handed  Scars:   PMH Past Medical History:   Diagnosis Date   • Heart burn    • Indigestion    • Renal disorder 1/2011    kidney infection   • Thyroid disease      Past Surgical History:   Procedure Laterality Date   • GASTROSCOPY-ENDO  10/25/2013    Performed by Zohaib Felix M.D. at [facility]   • EGD WITH ASP/BX  10/25/2013    Performed by Zohaib Felix M.D. at [facility]   • ELBOW ORIF  1991    right elbow   • TONSILLECTOMY AND ADENOIDECTOMY  as child       Social History     Social History   • Marital status:      Spouse name: N/A   • Number of children: N/A   • Years of education: N/A     Social History Main Topics   • Smoking status: Current Every Day Smoker     Packs/day: 0.50     Years: 10.00     Types: Cigarettes   • Smokeless tobacco: Never Used      Comment: one pack every other  day   • Alcohol use 0.5 oz/week     1 Glasses of wine per week      Comment: twice a year   • Drug use: No   • Sexual activity: Yes     Partners: Male     Birth control/ protection: Pill     Other Topics Concern   • Not on file     Social History Narrative   • No narrative on file      MEDS Current Outpatient Prescriptions on File Prior to Visit   Medication Sig Dispense Refill   • SYNTHROID 112 MCG Tab TAKE 1 TABLET BY MOUTH EVERY MORNING ON AN EMPTY STOMACH. 90 Tab 2   • SYNTHROID 112 MCG Tab TAKE 1 TABLET BY MOUTH EVERY MORNING ON AN EMPTY STOMACH. 90 Tab 1   • hydrocodone-acetaminophen (NORCO) 5-325 MG Tab per tablet Take 1-2 Tabs by mouth every 6 hours as needed. 20 Tab 0   • pantoprazole (PROTONIX) 40 MG Tablet Delayed Response Take 1 Tab by mouth every day. 30 Tab 11     No current facility-administered medications on file prior to visit.       ALLERGIES Allergies   Allergen Reactions   • Iodine Anaphylaxis and Hives      PE Praveen Exam: Stomach Qi: (+) pecking radial pulse  (+) Oketsu, (+) Immune,   (R) Adrenal - B/LKid16 St9 DaiMai ASIS Kid2         Assessment Eastern Liver/blood stagnation, Stomach qi imbalance, Immune imbalance, Adrenal exhaustion.    Western Encounter Diagnoses   Name Primary?   • Dyspepsia    • Migraine with aura and without status migrainosus, not intractable    • Hypothyroidism, unspecified type                   Plan Set 1: Left (Lv4, Lu5)  Set 2: B/L LI10-11 area  Set 3: L (SI 7 --> SI 13, TW 5, SP 6 --> SP 9, KD 7 ++> KD 10), R (HT 5 --> HT 3, PC 6 ST 39 ++> ST 36, GB 39 --> GB 34)  Set 4: Stomach area (B/L Er pung 2 ji), B/L fertility area     Damion Fisher D.O.     Total face to face time was 20 minutes with more than 15 minutes were spent discussing with the patient about her condition which did not include procedure time. >50% of the face to face time was spent in counseling and coordination. Topics discussed included:   Continue use ginger tea in the morning for the next 2  weeks and can continue her sleep hygiene with no blue light exposure 2 hours before sleep.  Explanation of mechanism of smooth muscle spasm with increased sympathetic/parasympathatic tone  Total acupuncture treatment time = 45 minutes

## 2017-10-25 NOTE — PATIENT INSTRUCTIONS
Have encouraged the patient to rest after the acupuncture session today - taking naps or going to sleep early as necessary.  Increase intake of water and refrain from strenuous activities.  Patient may expect to feel transient worsening of symptoms, but this should resolve to benefit in the next day or two after treatment.    The side effects of Acupuncture needle insertion include: minor bruising, bleeding, or pain at the site of needle insertion.  If more worrisome symptoms, such as continued bleeding, severe bruising, or continue pain or altered sensation persist, please contact [facility] office @ [phone]

## 2017-11-09 DIAGNOSIS — E03.9 ACQUIRED HYPOTHYROIDISM: ICD-10-CM

## 2017-11-10 ENCOUNTER — HOSPITAL ENCOUNTER (OUTPATIENT)
Dept: LAB | Facility: MEDICAL CENTER | Age: 32
End: 2017-11-10
Attending: INTERNAL MEDICINE
Payer: COMMERCIAL

## 2017-11-10 DIAGNOSIS — E03.9 ACQUIRED HYPOTHYROIDISM: ICD-10-CM

## 2017-11-10 LAB
T4 FREE SERPL-MCNC: 1 NG/DL (ref 0.53–1.43)
TSH SERPL DL<=0.005 MIU/L-ACNC: 1.49 UIU/ML (ref 0.3–3.7)

## 2017-11-10 PROCEDURE — 36415 COLL VENOUS BLD VENIPUNCTURE: CPT

## 2017-11-10 PROCEDURE — 84439 ASSAY OF FREE THYROXINE: CPT

## 2017-11-10 PROCEDURE — 84443 ASSAY THYROID STIM HORMONE: CPT

## 2017-11-17 DIAGNOSIS — N92.6 IRREGULAR MENSES: ICD-10-CM

## 2017-12-04 DIAGNOSIS — G89.29 CHRONIC ABDOMINAL PAIN: ICD-10-CM

## 2017-12-04 DIAGNOSIS — R10.9 CHRONIC ABDOMINAL PAIN: ICD-10-CM

## 2017-12-04 RX ORDER — PANTOPRAZOLE SODIUM 40 MG/1
40 TABLET, DELAYED RELEASE ORAL DAILY
Qty: 30 TAB | Refills: 11 | Status: SHIPPED | OUTPATIENT
Start: 2017-12-04 | End: 2018-09-08

## 2017-12-19 ENCOUNTER — OFFICE VISIT (OUTPATIENT)
Dept: ENDOCRINOLOGY | Facility: MEDICAL CENTER | Age: 32
End: 2017-12-19
Payer: COMMERCIAL

## 2017-12-19 VITALS
WEIGHT: 122 LBS | DIASTOLIC BLOOD PRESSURE: 54 MMHG | OXYGEN SATURATION: 96 % | BODY MASS INDEX: 20.33 KG/M2 | HEIGHT: 65 IN | RESPIRATION RATE: 12 BRPM | SYSTOLIC BLOOD PRESSURE: 106 MMHG | HEART RATE: 110 BPM

## 2017-12-19 DIAGNOSIS — E03.9 ACQUIRED HYPOTHYROIDISM: ICD-10-CM

## 2017-12-19 PROCEDURE — 99213 OFFICE O/P EST LOW 20 MIN: CPT | Performed by: INTERNAL MEDICINE

## 2017-12-19 ASSESSMENT — PAIN SCALES - GENERAL: PAINLEVEL: NO PAIN

## 2017-12-19 NOTE — PROGRESS NOTES
"Endocrinology Clinic Progress Note    CC: Hypothyroidism    HPI:  Roxane Toth is a 32 y.o. old patient who comes in today for routine follow up. She is currently on levothyroxine 112 µg daily. She reports compliance with medication. Recent TSH is 1.49. She complains of excessive sweating just before and during her menstrual periods. She is following with OB/GYN for this.    ROS:  Constitutional: No unintentional weight loss    EXAM:  Vital signs: /54   Pulse (!) 110   Resp 12   Ht 1.651 m (5' 5\")   Wt 55.3 kg (122 lb)   SpO2 96%   BMI 20.30 kg/m²   General: No apparent distress, cooperative  Eyes: No scleral icterus, no discharge  Resp: Normal effort  Extremities: No lower extremity edema  Psych: Alert and oriented, normal mood and affect    Assessment and Plan:    1. Acquired hypothyroidism  · Recent TSH is at goal, it is 1.49 and free T4 is 1.0  · Continue levothyroxine 112 µg daily  · Repeat labs in 6 months, and in one year (also advised to repeat labs for TSH and free T4 about 6-8 weeks later if she starts any birth control pills)  - FREE THYROXINE; Future  - TSH; Future  - TSH; Future  - FREE THYROXINE; Future    Return in about 1 year (around 12/19/2018).    Thank you for allowing me to participate in the care of this patient.    Jamar Doty M.D.    CC:   JOSEPH Goodman    This note was created using voice recognition software (Dragon). The accuracy of the dictation is limited by the abilities of the software. I have reviewed the note prior to signing, however some errors in grammar and context are still possible. If you have any questions related to this note please do not hesitate to contact our office.       "

## 2018-03-19 RX ORDER — LEVOTHYROXINE SODIUM 112 MCG
TABLET ORAL
Qty: 90 TAB | Refills: 2 | Status: SHIPPED | OUTPATIENT
Start: 2018-03-19 | End: 2018-10-16 | Stop reason: SDUPTHER

## 2018-04-25 ENCOUNTER — HOSPITAL ENCOUNTER (OUTPATIENT)
Dept: LAB | Facility: MEDICAL CENTER | Age: 33
End: 2018-04-25
Attending: NURSE PRACTITIONER
Payer: COMMERCIAL

## 2018-04-25 PROCEDURE — 88175 CYTOPATH C/V AUTO FLUID REDO: CPT

## 2018-04-25 PROCEDURE — 87624 HPV HI-RISK TYP POOLED RSLT: CPT

## 2018-04-26 LAB — AMBIGUOUS DTTM AMBI4: NORMAL

## 2018-04-27 LAB
CYTOLOGY REG CYTOL: NORMAL
HPV HR 12 DNA CVX QL NAA+PROBE: NEGATIVE
HPV16 DNA SPEC QL NAA+PROBE: NEGATIVE
HPV18 DNA SPEC QL NAA+PROBE: NEGATIVE
SPECIMEN SOURCE: NORMAL

## 2018-06-12 ENCOUNTER — HOSPITAL ENCOUNTER (OUTPATIENT)
Facility: MEDICAL CENTER | Age: 33
End: 2018-06-12
Payer: COMMERCIAL

## 2018-06-12 LAB
BDY FAT % MEASURED: 19.5 %
BP DIAS: 62 MMHG
BP SYS: 108 MMHG
CHOLEST SERPL-MCNC: 150 MG/DL (ref 100–199)
DIABETES HTDIA: NO
EVENT NAME HTEVT: NORMAL
FASTING HTFAS: YES
GLUCOSE SERPL-MCNC: 100 MG/DL (ref 65–99)
HDLC SERPL-MCNC: 59 MG/DL
HYPERTENSION HTHYP: NO
LDLC SERPL CALC-MCNC: 81 MG/DL
SCREENING LOC CITY HTCIT: NORMAL
SCREENING LOC STATE HTSTA: NORMAL
SCREENING LOCATION HTLOC: NORMAL
SMOKING HTSMO: YES
SUBSCRIBER ID HTSID: NORMAL
TRIGL SERPL-MCNC: 52 MG/DL (ref 0–149)

## 2018-06-12 PROCEDURE — S5190 WELLNESS ASSESSMENT BY NONPH: HCPCS

## 2018-06-12 PROCEDURE — 80061 LIPID PANEL: CPT

## 2018-06-12 PROCEDURE — 82947 ASSAY GLUCOSE BLOOD QUANT: CPT

## 2018-07-09 ENCOUNTER — OFFICE VISIT (OUTPATIENT)
Dept: URGENT CARE | Facility: CLINIC | Age: 33
End: 2018-07-09
Payer: COMMERCIAL

## 2018-07-09 VITALS
DIASTOLIC BLOOD PRESSURE: 58 MMHG | OXYGEN SATURATION: 99 % | RESPIRATION RATE: 15 BRPM | TEMPERATURE: 98.8 F | HEART RATE: 84 BPM | WEIGHT: 126 LBS | SYSTOLIC BLOOD PRESSURE: 110 MMHG | BODY MASS INDEX: 20.99 KG/M2 | HEIGHT: 65 IN

## 2018-07-09 DIAGNOSIS — J02.9 SORE THROAT: ICD-10-CM

## 2018-07-09 DIAGNOSIS — J00 NASOPHARYNGITIS ACUTE: ICD-10-CM

## 2018-07-09 LAB
INT CON NEG: NORMAL
INT CON POS: NORMAL
S PYO AG THROAT QL: NORMAL

## 2018-07-09 PROCEDURE — 87880 STREP A ASSAY W/OPTIC: CPT | Performed by: EMERGENCY MEDICINE

## 2018-07-09 PROCEDURE — 99214 OFFICE O/P EST MOD 30 MIN: CPT | Performed by: EMERGENCY MEDICINE

## 2018-07-09 ASSESSMENT — ENCOUNTER SYMPTOMS
DIAPHORESIS: 0
ABDOMINAL PAIN: 0
VOMITING: 0
TROUBLE SWALLOWING: 0
DIARRHEA: 0
SHORTNESS OF BREATH: 0
SWOLLEN GLANDS: 1
COUGH: 0
NAUSEA: 0
CHILLS: 1
MYALGIAS: 0

## 2018-07-10 NOTE — PROGRESS NOTES
Subjective:      Roxane Toth is a 33 y.o. female who presents with Otalgia (Lt ear, sore throat, nasal congestion x yesturday )            Pharyngitis    This is a new problem. The current episode started yesterday. The problem has been unchanged. Neither side of throat is experiencing more pain than the other. There has been no fever (subjective). Associated symptoms include congestion, ear pain, a plugged ear sensation and swollen glands. Pertinent negatives include no abdominal pain, coughing, diarrhea, ear discharge, shortness of breath, trouble swallowing or vomiting. She has had no exposure to strep or mono. She has tried cool liquids for the symptoms. The treatment provided mild relief.       Review of Systems   Constitutional: Positive for chills. Negative for diaphoresis.   HENT: Positive for congestion and ear pain. Negative for ear discharge, nosebleeds, tinnitus and trouble swallowing.    Respiratory: Negative for cough and shortness of breath.    Gastrointestinal: Negative for abdominal pain, diarrhea, nausea and vomiting.   Musculoskeletal: Negative for myalgias.   Skin: Negative for rash.   Endo/Heme/Allergies: Negative for environmental allergies.     PMH:  has a past medical history of Heart burn; Indigestion; Renal disorder (1/2011); and Thyroid disease. She also has no past medical history of Allergy or Anemia.  MEDS:   Current Outpatient Prescriptions:   •  SYNTHROID 112 MCG Tab, TAKE 1 TABLET BY MOUTH EVERY MORNING ON AN EMPTY STOMACH., Disp: 90 Tab, Rfl: 2  •  pantoprazole (PROTONIX) 40 MG Tablet Delayed Response, Take 1 Tab by mouth every day., Disp: 30 Tab, Rfl: 11  ALLERGIES:   Allergies   Allergen Reactions   • Iodine Anaphylaxis and Hives     SURGHX:   Past Surgical History:   Procedure Laterality Date   • GASTROSCOPY-ENDO  10/25/2013    Performed by Zohaib Felix M.D. at [facility]   • EGD WITH ASP/BX  10/25/2013    Performed by Zohaib Felix  "M.D. at [facility]   • ELBOW ORIF  1991    right elbow   • TONSILLECTOMY AND ADENOIDECTOMY  as child     SOCHX:  reports that she has been smoking Cigarettes.  She has a 5.00 pack-year smoking history. She has never used smokeless tobacco. She reports that she drinks about 0.5 oz of alcohol per week . She reports that she does not use drugs.  FH: family history includes Cancer in her mother; Other in her father.     Objective:     /58   Pulse 84   Temp 37.1 °C (98.8 °F)   Resp 15   Ht 1.651 m (5' 5\")   Wt 57.2 kg (126 lb)   SpO2 99%   BMI 20.97 kg/m²      Physical Exam   Constitutional: She is oriented to person, place, and time. She appears well-developed and well-nourished. She is cooperative.  Non-toxic appearance. She does not have a sickly appearance. No distress.   HENT:   Head: Normocephalic.   Right Ear: Tympanic membrane and ear canal normal.   Left Ear: Tympanic membrane, external ear and ear canal normal. No drainage, swelling or tenderness. No mastoid tenderness.  No middle ear effusion. Decreased hearing is noted.   Nose: Mucosal edema and rhinorrhea present.   Mouth/Throat: Mucous membranes are normal. No trismus in the jaw. No uvula swelling. Posterior oropharyngeal erythema present. No oropharyngeal exudate, posterior oropharyngeal edema or tonsillar abscesses. No tonsillar exudate.   Eyes: Conjunctivae and lids are normal.   Neck: Trachea normal and phonation normal. Neck supple.   Cardiovascular: Normal rate, regular rhythm and normal heart sounds.    No murmur heard.  Pulmonary/Chest: Effort normal and breath sounds normal.   Lymphadenopathy:     She has cervical adenopathy.        Right cervical: Superficial cervical adenopathy present. No posterior cervical adenopathy present.       Left cervical: Superficial cervical adenopathy present. No posterior cervical adenopathy present.   Neurological: She is alert and oriented to person, place, and time.   Skin: Skin is warm " and dry.   Psychiatric: She has a normal mood and affect.          Left ear symptoms likely related to referred pain, eustachian tube dysfunction. Advised OTC decongestants; recheck if any worsening condition.     Assessment/Plan:     1. Nasopharyngitis acute  Recommended supportive care measures, including rest, increasing oral fluid intake and use of over-the-counter medications for relief of symptoms.    2. Sore throat  negative- POCT Rapid Strep A

## 2018-07-10 NOTE — PATIENT INSTRUCTIONS
Use over-the-counter oxymetazoline (Afrin) nasal spray as needed for up to 3 days only; follow package instructions for dosing.  Pharyngitis  Pharyngitis is redness, pain, and swelling (inflammation) of your pharynx.  What are the causes?  Pharyngitis is usually caused by infection. Most of the time, these infections are from viruses (viral) and are part of a cold. However, sometimes pharyngitis is caused by bacteria (bacterial). Pharyngitis can also be caused by allergies. Viral pharyngitis may be spread from person to person by coughing, sneezing, and personal items or utensils (cups, forks, spoons, toothbrushes). Bacterial pharyngitis may be spread from person to person by more intimate contact, such as kissing.  What are the signs or symptoms?  Symptoms of pharyngitis include:  · Sore throat.  · Tiredness (fatigue).  · Low-grade fever.  · Headache.  · Joint pain and muscle aches.  · Skin rashes.  · Swollen lymph nodes.  · Plaque-like film on throat or tonsils (often seen with bacterial pharyngitis).  How is this diagnosed?  Your health care provider will ask you questions about your illness and your symptoms. Your medical history, along with a physical exam, is often all that is needed to diagnose pharyngitis. Sometimes, a rapid strep test is done. Other lab tests may also be done, depending on the suspected cause.  How is this treated?  Viral pharyngitis will usually get better in 3-4 days without the use of medicine. Bacterial pharyngitis is treated with medicines that kill germs (antibiotics).  Follow these instructions at home:  · Drink enough water and fluids to keep your urine clear or pale yellow.  · Only take over-the-counter or prescription medicines as directed by your health care provider:  ¨ If you are prescribed antibiotics, make sure you finish them even if you start to feel better.  ¨ Do not take aspirin.  · Get lots of rest.  · Gargle with 8 oz of salt water (½ tsp of salt per 1 qt of water) as  often as every 1-2 hours to soothe your throat.  · Throat lozenges (if you are not at risk for choking) or sprays may be used to soothe your throat.  Contact a health care provider if:  · You have large, tender lumps in your neck.  · You have a rash.  · You cough up green, yellow-brown, or bloody spit.  Get help right away if:  · Your neck becomes stiff.  · You drool or are unable to swallow liquids.  · You vomit or are unable to keep medicines or liquids down.  · You have severe pain that does not go away with the use of recommended medicines.  · You have trouble breathing (not caused by a stuffy nose).  This information is not intended to replace advice given to you by your health care provider. Make sure you discuss any questions you have with your health care provider.  Document Released: 12/18/2006 Document Revised: 05/25/2017 Document Reviewed: 08/25/2014  [employer] Interactive Patient Education © 2017 [employer]

## 2018-07-12 ENCOUNTER — OFFICE VISIT (OUTPATIENT)
Dept: URGENT CARE | Facility: CLINIC | Age: 33
End: 2018-07-12
Payer: COMMERCIAL

## 2018-07-12 VITALS
TEMPERATURE: 98.1 F | BODY MASS INDEX: 20.99 KG/M2 | HEART RATE: 63 BPM | WEIGHT: 126 LBS | HEIGHT: 65 IN | OXYGEN SATURATION: 96 % | SYSTOLIC BLOOD PRESSURE: 92 MMHG | RESPIRATION RATE: 16 BRPM | DIASTOLIC BLOOD PRESSURE: 52 MMHG

## 2018-07-12 DIAGNOSIS — R42 LIGHTHEADEDNESS: ICD-10-CM

## 2018-07-12 DIAGNOSIS — J06.9 UPPER RESPIRATORY TRACT INFECTION, UNSPECIFIED TYPE: ICD-10-CM

## 2018-07-12 PROCEDURE — 99214 OFFICE O/P EST MOD 30 MIN: CPT | Performed by: PHYSICIAN ASSISTANT

## 2018-07-12 RX ORDER — AZITHROMYCIN 250 MG/1
TABLET, FILM COATED ORAL
Qty: 6 TAB | Refills: 0 | Status: SHIPPED | OUTPATIENT
Start: 2018-07-12 | End: 2018-09-08

## 2018-07-12 RX ORDER — BENZONATATE 100 MG/1
100 CAPSULE ORAL 3 TIMES DAILY PRN
Qty: 30 CAP | Refills: 0 | Status: SHIPPED | OUTPATIENT
Start: 2018-07-12 | End: 2018-09-08

## 2018-07-12 NOTE — LETTER
July 12, 2018         Patient: Roxane Toth   YOB: 1985   Date of Visit: 7/12/2018           To Whom it May Concern:    Roxane Toth was seen in my clinic on 7/12/2018. Please excuse this patient from work due to recent ailment- she may return on Monday only if symptoms have improved.     If you have any questions or concerns, please don't hesitate to call.        Sincerely,           Bulmaro Heller P.A.-C.  Electronically Signed

## 2018-07-13 ENCOUNTER — OFFICE VISIT (OUTPATIENT)
Dept: MEDICAL GROUP | Facility: MEDICAL CENTER | Age: 33
End: 2018-07-13
Payer: COMMERCIAL

## 2018-07-13 VITALS
HEIGHT: 65 IN | BODY MASS INDEX: 20.66 KG/M2 | SYSTOLIC BLOOD PRESSURE: 104 MMHG | DIASTOLIC BLOOD PRESSURE: 70 MMHG | HEART RATE: 99 BPM | TEMPERATURE: 98.6 F | OXYGEN SATURATION: 97 % | RESPIRATION RATE: 16 BRPM | WEIGHT: 124 LBS

## 2018-07-13 DIAGNOSIS — J06.9 VIRAL UPPER RESPIRATORY TRACT INFECTION: ICD-10-CM

## 2018-07-13 DIAGNOSIS — H65.191 OTHER ACUTE NONSUPPURATIVE OTITIS MEDIA OF RIGHT EAR, RECURRENCE NOT SPECIFIED: ICD-10-CM

## 2018-07-13 PROCEDURE — 99213 OFFICE O/P EST LOW 20 MIN: CPT | Performed by: FAMILY MEDICINE

## 2018-07-13 ASSESSMENT — PATIENT HEALTH QUESTIONNAIRE - PHQ9: CLINICAL INTERPRETATION OF PHQ2 SCORE: 0

## 2018-07-13 NOTE — PROGRESS NOTES
"cc:  Cough, malaise, fevers    Subjective:     Roxane Toth is a 33 y.o. female presenting for URI.     Patient has been ill for the last 5 days. Started with sore throat and subjective fever/chills. She was seen in urgent care and rapid strep was neg. But then her cough worsened and she returned to urgent care yesterday. She did start a course of azithromycin yesterday.   She is having subjective fevers followed by chills. When this happens, she feels lightheaded but never with syncope. No chest pain. No shortness of breath. No nausea, vomiting, diarrhea. No rash. No neck pain.     Review of systems:  See above.       Current Outpatient Prescriptions:   •  benzonatate (TESSALON) 100 MG Cap, Take 1 Cap by mouth 3 times a day as needed for Cough., Disp: 30 Cap, Rfl: 0  •  azithromycin (ZITHROMAX) 250 MG Tab, Take 2 tabs today, then 1 tab daily til completed., Disp: 6 Tab, Rfl: 0  •  SYNTHROID 112 MCG Tab, TAKE 1 TABLET BY MOUTH EVERY MORNING ON AN EMPTY STOMACH., Disp: 90 Tab, Rfl: 2  •  pantoprazole (PROTONIX) 40 MG Tablet Delayed Response, Take 1 Tab by mouth every day., Disp: 30 Tab, Rfl: 11    Allergies, past medical history, past surgical history, family history, social history reviewed and updated    Objective:     Vitals: /70   Pulse 99   Temp 37 °C (98.6 °F)   Resp 16   Ht 1.651 m (5' 5\")   Wt 56.2 kg (124 lb)   LMP 07/09/2018   SpO2 97%   Breastfeeding? No   BMI 20.63 kg/m²   General: Alert, pleasant, NAD. Tired appearing but non-toxic.   HEENT: Normocephalic.  PERRL, EOMI, no icterus or pallor.  Conjunctivae and lids normal. External ears normal. Tympanic membranes pearly, opaque on the left, slightly erythematous on the right.  Nares patent, mucosa pink, drainage present. Oropharynx non-erythematous, mucous membranes moist.  Neck supple.   No cervical or supraclavicular lymphadenopathy.  Heart: Regular rate and rhythm.  S1 and S2 normal.  No murmurs appreciated.  Respiratory: " Normal respiratory effort.  Clear to auscultation bilaterally.  Skin: Warm, dry, no rashes.  Musculoskeletal: Gait is normal.  Moves all extremities well.  Extremities: No leg edema.    Psych:  Affect/mood is normal, judgement is good, memory is intact, grooming is appropriate.    Assessment/Plan:     Diagnoses and all orders for this visit:    Viral upper respiratory tract infection  Other acute nonsuppurative otitis media of right ear, recurrence not specified  Patient likely with viral URI with secondary acute otitis media. No signs of pneumonia, meningitis, sepsis currently. Patient instructed to complete course of azithromycin for otitis and for rest, tylenol, hot water with honey, flonase, and RTC if symptoms worsen.       Return if symptoms worsen or fail to improve.

## 2018-07-14 ASSESSMENT — ENCOUNTER SYMPTOMS
CHILLS: 0
NAUSEA: 1
EYE REDNESS: 0
HEADACHES: 0
DIZZINESS: 1
WHEEZING: 0
DIARRHEA: 0
EYE DISCHARGE: 0
ABDOMINAL PAIN: 0
SORE THROAT: 1
FEVER: 0
TINGLING: 0
NECK PAIN: 1
COUGH: 1
MYALGIAS: 0
SPUTUM PRODUCTION: 1
SINUS PAIN: 0
VOMITING: 0
SHORTNESS OF BREATH: 0
RHINORRHEA: 1

## 2018-07-14 NOTE — PROGRESS NOTES
"Subjective:      Roxane Toth is a 33 y.o. female who presents with URI (was here on monday, chest congestion, earache(L), lightheadness)          Patient is 33-year-old female who returns today with continued cough, congestion, earache and intermittent lightheadedness for the last 6 days.  Patient was diagnosed with viral URI a few days ago and encouraged to continue with supportive therapies.      URI    This is a new problem. The current episode started in the past 7 days. The problem has been waxing and waning. There has been no fever. Associated symptoms include congestion, coughing, ear pain, nausea, neck pain, a plugged ear sensation, rhinorrhea and a sore throat. Pertinent negatives include no abdominal pain, chest pain, diarrhea, dysuria, headaches, rash, sinus pain, vomiting or wheezing. Treatments tried: Over-the-counter cough medication.       Review of Systems   Constitutional: Negative for chills, fever and malaise/fatigue.   HENT: Positive for congestion, ear pain, rhinorrhea and sore throat. Negative for ear discharge and sinus pain.    Eyes: Negative for discharge and redness.   Respiratory: Positive for cough and sputum production. Negative for shortness of breath and wheezing.    Cardiovascular: Negative for chest pain and leg swelling.   Gastrointestinal: Positive for nausea. Negative for abdominal pain, diarrhea and vomiting.   Genitourinary: Negative for dysuria and urgency.   Musculoskeletal: Positive for neck pain. Negative for myalgias.   Skin: Negative for itching and rash.   Neurological: Positive for dizziness. Negative for tingling and headaches.          Objective:     BP (!) 92/52   Pulse 63   Temp 36.7 °C (98.1 °F)   Resp 16   Ht 1.651 m (5' 5\")   Wt 57.2 kg (126 lb)   LMP 07/09/2018   SpO2 96%   BMI 20.97 kg/m²    PMH:  has a past medical history of Heart burn; Indigestion; Renal disorder (1/2011); and Thyroid disease. She also has no past medical history of " Allergy or Anemia.  MEDS:   Current Outpatient Prescriptions:   •  benzonatate (TESSALON) 100 MG Cap, Take 1 Cap by mouth 3 times a day as needed for Cough., Disp: 30 Cap, Rfl: 0  •  azithromycin (ZITHROMAX) 250 MG Tab, Take 2 tabs today, then 1 tab daily til completed., Disp: 6 Tab, Rfl: 0  •  SYNTHROID 112 MCG Tab, TAKE 1 TABLET BY MOUTH EVERY MORNING ON AN EMPTY STOMACH., Disp: 90 Tab, Rfl: 2  •  pantoprazole (PROTONIX) 40 MG Tablet Delayed Response, Take 1 Tab by mouth every day., Disp: 30 Tab, Rfl: 11  ALLERGIES:   Allergies   Allergen Reactions   • Iodine Anaphylaxis and Hives     SURGHX:   Past Surgical History:   Procedure Laterality Date   • GASTROSCOPY-ENDO  10/25/2013    Performed by Zohaib Felix M.D. at [facility]   • EGD WITH ASP/BX  10/25/2013    Performed by Zohaib Felix M.D. at [facility]   • ELBOW ORIF  1991    right elbow   • TONSILLECTOMY AND ADENOIDECTOMY  as child     SOCHX:  reports that she has been smoking Cigarettes.  She has a 5.00 pack-year smoking history. She has never used smokeless tobacco. She reports that she drinks about 0.5 oz of alcohol per week . She reports that she does not use drugs.  FH: Family history was reviewed, no pertinent findings to report    Physical Exam   Constitutional: She is oriented to person, place, and time. She appears well-developed and well-nourished.   HENT:   Head: Normocephalic and atraumatic.   Mouth/Throat: No oropharyngeal exudate.   Ears- Canals clear- TM- with clear fluid effusions bilaterally.   Pos. PND, with slight erythema- without tonsillar edema or exudate.   Mild discharge noted bilaterally- to nares.      Eyes: EOM are normal. Pupils are equal, round, and reactive to light.   Neck: Normal range of motion. Neck supple.   Cardiovascular: Normal rate and regular rhythm.    No murmur heard.  Pulmonary/Chest: Effort normal and breath sounds normal. No respiratory distress.   Musculoskeletal: Normal  range of motion. She exhibits no tenderness.   Lymphadenopathy:     She has no cervical adenopathy.   Neurological: She is alert and oriented to person, place, and time.   Skin: Skin is warm. No rash noted.   Psychiatric: She has a normal mood and affect. Her behavior is normal.   Vitals reviewed.              Assessment/Plan:     1. Upper respiratory tract infection, unspecified type  - benzonatate (TESSALON) 100 MG Cap; Take 1 Cap by mouth 3 times a day as needed for Cough.  Dispense: 30 Cap; Refill: 0    2. Lightheadedness    During examination today with deep breathing patient slightly felt lightheaded of which she was encouraged to lay down ice pack was given to the patient.  Patient readily admits that she did not eat anything today nor had dinner last night, and I feel that this is contributing to patient's lightheadedness along with patient slightly low blood pressure.  She also readily admits that she is not drinking enough water at this time.  Patient admits that her episode of lightheadedness had resolved and she would like to go home at this time.     It was explained to the pt. Today that due to the viral nature of the pt's illness, we will treat symptomatically today. Encouraged OTC supportive meds PRN. Humidification, increase fluids, avoid night time dairy.   Of note contingent antibiotic was written for  For this patient today to only take based on guidelines discussed.  Patient given precautionary s/sx that mandate immediate follow up and evaluation in the ED. Advised of risks of not doing so.    DDX, Supportive care, and indications for immediate follow-up discussed with patient.    Instructed to return to clinic or nearest emergency department if we are not available for any change in condition, further concerns, or worsening of symptoms.    The patient demonstrated a good understanding and agreed with the treatment plan.    Please note that this dictation was created using voice recognition  software. I have made every reasonable attempt to correct obvious errors, but I expect that there are errors of grammar and possibly content that I did not discover before finalizing the note.

## 2018-07-16 ENCOUNTER — OFFICE VISIT (OUTPATIENT)
Dept: URGENT CARE | Facility: CLINIC | Age: 33
End: 2018-07-16
Payer: COMMERCIAL

## 2018-07-16 VITALS
DIASTOLIC BLOOD PRESSURE: 64 MMHG | OXYGEN SATURATION: 97 % | TEMPERATURE: 97.9 F | HEIGHT: 65 IN | SYSTOLIC BLOOD PRESSURE: 108 MMHG | RESPIRATION RATE: 16 BRPM | BODY MASS INDEX: 20.83 KG/M2 | WEIGHT: 125 LBS | HEART RATE: 88 BPM

## 2018-07-16 DIAGNOSIS — R05.9 COUGH: ICD-10-CM

## 2018-07-16 DIAGNOSIS — J98.01 BRONCHOSPASM, ACUTE: Primary | ICD-10-CM

## 2018-07-16 PROCEDURE — 99214 OFFICE O/P EST MOD 30 MIN: CPT | Performed by: PHYSICIAN ASSISTANT

## 2018-07-16 RX ORDER — ALBUTEROL SULFATE 90 UG/1
2 AEROSOL, METERED RESPIRATORY (INHALATION) EVERY 4 HOURS PRN
Qty: 1 INHALER | Refills: 1 | Status: SHIPPED | OUTPATIENT
Start: 2018-07-16 | End: 2018-09-08

## 2018-07-16 RX ORDER — PREDNISONE 20 MG/1
TABLET ORAL
Qty: 21 TAB | Refills: 0 | Status: SHIPPED | OUTPATIENT
Start: 2018-07-16 | End: 2018-09-08

## 2018-07-16 RX ORDER — CODEINE PHOSPHATE/GUAIFENESIN 10-100MG/5
10 LIQUID (ML) ORAL 4 TIMES DAILY PRN
Qty: 200 ML | Refills: 0 | Status: SHIPPED | OUTPATIENT
Start: 2018-07-16 | End: 2018-07-21

## 2018-07-16 RX ORDER — IPRATROPIUM BROMIDE AND ALBUTEROL SULFATE 2.5; .5 MG/3ML; MG/3ML
3 SOLUTION RESPIRATORY (INHALATION) ONCE
Status: COMPLETED | OUTPATIENT
Start: 2018-07-16 | End: 2018-07-16

## 2018-07-16 RX ADMIN — IPRATROPIUM BROMIDE AND ALBUTEROL SULFATE 3 ML: 2.5; .5 SOLUTION RESPIRATORY (INHALATION) at 10:01

## 2018-07-16 ASSESSMENT — ENCOUNTER SYMPTOMS
COUGH: 1
RHINORRHEA: 1
CHILLS: 0
VOMITING: 0
FEVER: 0
WHEEZING: 1
SWOLLEN GLANDS: 0
SPUTUM PRODUCTION: 1
SINUS PAIN: 1
SORE THROAT: 0
SHORTNESS OF BREATH: 0
STRIDOR: 0

## 2018-07-16 NOTE — LETTER
July 16, 2018         Patient: Roxane Toth   YOB: 1985   Date of Visit: 7/16/2018           To Whom it May Concern:    Roxane Toth was seen in my clinic on 7/16/2018. She may return to work on 07/18/2018.    If you have any questions or concerns, please don't hesitate to call.        Sincerely,           Beatriz Maier P.A.-C.  Electronically Signed

## 2018-07-17 NOTE — PROGRESS NOTES
Subjective:      Roxane Toth is a 33 y.o. female who presents with Congestion (pt states she was seen twice last week and still not feeling better and needs a doctors note)    PMH:  has a past medical history of Heart burn; Indigestion; Renal disorder (1/2011); and Thyroid disease. She also has no past medical history of Allergy or Anemia.  MEDS:   Current Outpatient Prescriptions:   •  albuterol 108 (90 Base) MCG/ACT Aero Soln inhalation aerosol, Inhale 2 Puffs by mouth every four hours as needed for Shortness of Breath., Disp: 1 Inhaler, Rfl: 1  •  predniSONE (DELTASONE) 20 MG Tab, Take 3 tabs a day x 3 days, then 2 tabs a day x 3 days, then 1 tab a day x 3 days, then  one half tablet per day x 4 days., Disp: 21 Tab, Rfl: 0  •  guaifenesin-codeine (TUSSI-ORGANIDIN NR) 100-10 MG/5ML syrup, Take 10 mL by mouth 4 times a day as needed for Cough for up to 5 days., Disp: 200 mL, Rfl: 0  •  benzonatate (TESSALON) 100 MG Cap, Take 1 Cap by mouth 3 times a day as needed for Cough., Disp: 30 Cap, Rfl: 0  •  azithromycin (ZITHROMAX) 250 MG Tab, Take 2 tabs today, then 1 tab daily til completed., Disp: 6 Tab, Rfl: 0  •  SYNTHROID 112 MCG Tab, TAKE 1 TABLET BY MOUTH EVERY MORNING ON AN EMPTY STOMACH., Disp: 90 Tab, Rfl: 2  •  pantoprazole (PROTONIX) 40 MG Tablet Delayed Response, Take 1 Tab by mouth every day., Disp: 30 Tab, Rfl: 11  ALLERGIES:   Allergies   Allergen Reactions   • Iodine Anaphylaxis and Hives     SURGHX:   Past Surgical History:   Procedure Laterality Date   • GASTROSCOPY-ENDO  10/25/2013    Performed by Zohaib Felix M.D. at [facility]   • EGD WITH ASP/BX  10/25/2013    Performed by Zohaib Felix M.D. at [facility]   • ELBOW ORIF  1991    right elbow   • TONSILLECTOMY AND ADENOIDECTOMY  as child     SOCHX:  reports that she has been smoking Cigarettes.  She has a 5.00 pack-year smoking history. She has never used smokeless tobacco. She reports that  "she drinks about 0.5 oz of alcohol per week . She reports that she does not use drugs.  FH: family history includes Cancer in her mother; Other in her father.          PT co cough, congestion and wheeze x 2 weeks. Pt just completed zithromax for cough doesn't feel like the illness has completely resolved.            URI    This is a new problem. The current episode started 1 to 4 weeks ago. The problem has been gradually improving. There has been no fever. Associated symptoms include congestion, coughing, a plugged ear sensation, rhinorrhea, sinus pain and wheezing. Pertinent negatives include no chest pain, sneezing, sore throat, swollen glands or vomiting. She has tried acetaminophen, decongestant, increased fluids and NSAIDs (abx) for the symptoms. The treatment provided mild relief.       Review of Systems   Constitutional: Negative for chills and fever.   HENT: Positive for congestion, rhinorrhea and sinus pain. Negative for sneezing and sore throat.    Respiratory: Positive for cough, sputum production and wheezing. Negative for shortness of breath and stridor.    Cardiovascular: Negative for chest pain.   Gastrointestinal: Negative for vomiting.   All other systems reviewed and are negative.         Objective:     /64   Pulse 88   Temp 36.6 °C (97.9 °F)   Resp 16   Ht 1.651 m (5' 5\")   Wt 56.7 kg (125 lb)   LMP 07/09/2018   SpO2 97%   BMI 20.80 kg/m²      Physical Exam   Constitutional: She is oriented to person, place, and time. She appears well-developed and well-nourished.   HENT:   Head: Normocephalic and atraumatic.   Eyes: Conjunctivae and EOM are normal. Pupils are equal, round, and reactive to light.   Neck: Normal range of motion. Neck supple.   Cardiovascular: Normal rate, regular rhythm and normal heart sounds.    Pulmonary/Chest: Effort normal. No respiratory distress. She has rhonchi.   Abdominal: Soft.   Musculoskeletal: Normal range of motion.   Neurological: She is alert and " oriented to person, place, and time. Gait normal.   Skin: Skin is warm and dry. Capillary refill takes less than 2 seconds.   Psychiatric: She has a normal mood and affect.   Nursing note and vitals reviewed.              Assessment/Plan:     1. Bronchospasm, acute  ipratropium-albuterol (DUONEB) nebulizer solution    albuterol 108 (90 Base) MCG/ACT Aero Soln inhalation aerosol    predniSONE (DELTASONE) 20 MG Tab    guaifenesin-codeine (TUSSI-ORGANIDIN NR) 100-10 MG/5ML syrup   2. Cough  albuterol 108 (90 Base) MCG/ACT Aero Soln inhalation aerosol    predniSONE (DELTASONE) 20 MG Tab    guaifenesin-codeine (TUSSI-ORGANIDIN NR) 100-10 MG/5ML syrup     Pt has completed antibiotics with some improvement but without resolution.  I feel that she does not need more abx , but would benefit from steroids and albuterol.    PT can continue OTC medications, increase fluids and rest until symptoms improve.     PT instructed not to drive or operate heavy machinery or drink alcohol while taking this medication because it contains either a narcotic or benzodiazepines which causes drowsiness. PT verbalized understanding of these instructions.     [facility] Aware web site evaluation: I have obtained and reviewed patient utilization report from [facility] pharmacy database prior to writing prescription for controlled substance.  No history of abuse.    PT should follow up with PCP in 1-2 days for re-evaluation if symptoms have not improved.  Discussed red flags and reasons to return to UC or ED.  Pt and/or family verbalized understanding of diagnosis and follow up instructions and was offered informational handout on diagnosis.  PT discharged.

## 2018-09-08 ENCOUNTER — APPOINTMENT (OUTPATIENT)
Dept: RADIOLOGY | Facility: MEDICAL CENTER | Age: 33
End: 2018-09-08
Attending: EMERGENCY MEDICINE
Payer: COMMERCIAL

## 2018-09-08 ENCOUNTER — HOSPITAL ENCOUNTER (EMERGENCY)
Facility: MEDICAL CENTER | Age: 33
End: 2018-09-08
Attending: EMERGENCY MEDICINE
Payer: COMMERCIAL

## 2018-09-08 VITALS
WEIGHT: 125 LBS | SYSTOLIC BLOOD PRESSURE: 128 MMHG | BODY MASS INDEX: 20.83 KG/M2 | OXYGEN SATURATION: 97 % | HEIGHT: 65 IN | RESPIRATION RATE: 20 BRPM | HEART RATE: 67 BPM | TEMPERATURE: 96.9 F | DIASTOLIC BLOOD PRESSURE: 59 MMHG

## 2018-09-08 DIAGNOSIS — R00.2 PALPITATIONS: ICD-10-CM

## 2018-09-08 LAB
ALBUMIN SERPL BCP-MCNC: 3.9 G/DL (ref 3.2–4.9)
ALBUMIN/GLOB SERPL: 1.7 G/DL
ALP SERPL-CCNC: 40 U/L (ref 30–99)
ALT SERPL-CCNC: 12 U/L (ref 2–50)
ANION GAP SERPL CALC-SCNC: 6 MMOL/L (ref 0–11.9)
APTT PPP: 29 SEC (ref 24.7–36)
AST SERPL-CCNC: 14 U/L (ref 12–45)
BASOPHILS # BLD AUTO: 0.4 % (ref 0–1.8)
BASOPHILS # BLD: 0.04 K/UL (ref 0–0.12)
BILIRUB SERPL-MCNC: 0.3 MG/DL (ref 0.1–1.5)
BNP SERPL-MCNC: 18 PG/ML (ref 0–100)
BUN SERPL-MCNC: 10 MG/DL (ref 8–22)
CALCIUM SERPL-MCNC: 8.7 MG/DL (ref 8.5–10.5)
CHLORIDE SERPL-SCNC: 109 MMOL/L (ref 96–112)
CO2 SERPL-SCNC: 22 MMOL/L (ref 20–33)
CREAT SERPL-MCNC: 0.7 MG/DL (ref 0.5–1.4)
DEPRECATED D DIMER PPP IA-ACNC: <200 NG/ML(D-DU)
EKG IMPRESSION: NORMAL
EOSINOPHIL # BLD AUTO: 0.09 K/UL (ref 0–0.51)
EOSINOPHIL NFR BLD: 1 % (ref 0–6.9)
ERYTHROCYTE [DISTWIDTH] IN BLOOD BY AUTOMATED COUNT: 42.6 FL (ref 35.9–50)
GLOBULIN SER CALC-MCNC: 2.3 G/DL (ref 1.9–3.5)
GLUCOSE SERPL-MCNC: 136 MG/DL (ref 65–99)
HCG SERPL QL: NEGATIVE
HCT VFR BLD AUTO: 34.4 % (ref 37–47)
HGB BLD-MCNC: 12.2 G/DL (ref 12–16)
IMM GRANULOCYTES # BLD AUTO: 0.04 K/UL (ref 0–0.11)
IMM GRANULOCYTES NFR BLD AUTO: 0.4 % (ref 0–0.9)
INR PPP: 1.16 (ref 0.87–1.13)
LIPASE SERPL-CCNC: 36 U/L (ref 11–82)
LYMPHOCYTES # BLD AUTO: 3.14 K/UL (ref 1–4.8)
LYMPHOCYTES NFR BLD: 35.1 % (ref 22–41)
MCH RBC QN AUTO: 32.9 PG (ref 27–33)
MCHC RBC AUTO-ENTMCNC: 35.5 G/DL (ref 33.6–35)
MCV RBC AUTO: 92.7 FL (ref 81.4–97.8)
MONOCYTES # BLD AUTO: 0.54 K/UL (ref 0–0.85)
MONOCYTES NFR BLD AUTO: 6 % (ref 0–13.4)
NEUTROPHILS # BLD AUTO: 5.09 K/UL (ref 2–7.15)
NEUTROPHILS NFR BLD: 57.1 % (ref 44–72)
NRBC # BLD AUTO: 0 K/UL
NRBC BLD-RTO: 0 /100 WBC
PLATELET # BLD AUTO: 176 K/UL (ref 164–446)
PMV BLD AUTO: 12.1 FL (ref 9–12.9)
POTASSIUM SERPL-SCNC: 3.7 MMOL/L (ref 3.6–5.5)
PROT SERPL-MCNC: 6.2 G/DL (ref 6–8.2)
PROTHROMBIN TIME: 14.5 SEC (ref 12–14.6)
RBC # BLD AUTO: 3.71 M/UL (ref 4.2–5.4)
SODIUM SERPL-SCNC: 137 MMOL/L (ref 135–145)
T4 FREE SERPL-MCNC: 1.1 NG/DL (ref 0.53–1.43)
TROPONIN I SERPL-MCNC: <0.01 NG/ML (ref 0–0.04)
TSH SERPL DL<=0.005 MIU/L-ACNC: 0.2 UIU/ML (ref 0.38–5.33)
WBC # BLD AUTO: 8.9 K/UL (ref 4.8–10.8)

## 2018-09-08 PROCEDURE — 83880 ASSAY OF NATRIURETIC PEPTIDE: CPT

## 2018-09-08 PROCEDURE — 85025 COMPLETE CBC W/AUTO DIFF WBC: CPT

## 2018-09-08 PROCEDURE — 84439 ASSAY OF FREE THYROXINE: CPT

## 2018-09-08 PROCEDURE — 83690 ASSAY OF LIPASE: CPT

## 2018-09-08 PROCEDURE — 84484 ASSAY OF TROPONIN QUANT: CPT

## 2018-09-08 PROCEDURE — 84703 CHORIONIC GONADOTROPIN ASSAY: CPT

## 2018-09-08 PROCEDURE — 85730 THROMBOPLASTIN TIME PARTIAL: CPT

## 2018-09-08 PROCEDURE — 85379 FIBRIN DEGRADATION QUANT: CPT

## 2018-09-08 PROCEDURE — 84443 ASSAY THYROID STIM HORMONE: CPT

## 2018-09-08 PROCEDURE — 99284 EMERGENCY DEPT VISIT MOD MDM: CPT

## 2018-09-08 PROCEDURE — 85610 PROTHROMBIN TIME: CPT

## 2018-09-08 PROCEDURE — 71045 X-RAY EXAM CHEST 1 VIEW: CPT

## 2018-09-08 PROCEDURE — 80053 COMPREHEN METABOLIC PANEL: CPT

## 2018-09-08 PROCEDURE — 93005 ELECTROCARDIOGRAM TRACING: CPT | Performed by: EMERGENCY MEDICINE

## 2018-09-08 PROCEDURE — 93005 ELECTROCARDIOGRAM TRACING: CPT

## 2018-09-08 PROCEDURE — 36415 COLL VENOUS BLD VENIPUNCTURE: CPT

## 2018-09-08 ASSESSMENT — PAIN SCALES - GENERAL: PAINLEVEL_OUTOF10: 4

## 2018-09-08 NOTE — ED PROVIDER NOTES
ED Provider Note    Scribed for Dima Huang M.D. by Rosario Winston. 9/8/2018  3:45 PM    Primary care provider: JOSEPH Goodman  Means of arrival: Ambulance  History obtained from: Patient  History limited by: None    CHIEF COMPLAINT  Chief Complaint   Patient presents with   • Palpitations     pt reports intermitent chest palpitations starting at 1345 today.  pt went to fire station for evaluation.  pt states recent increased stress level.  pt denies recent travel.  pt reports diff breathing during episode.  pt denies fever at home.  pt states brief pain radiating to her back.         HPI  Roxane Toth is a 33 y.o. female who presents to the Emergency Department complaining of intermittent chest palpitations with onset approximately 1:45pm today. She was sitting in a chair in her backyard when the symptoms began. She states that she felt a heavy pressure on her chest and her heart was racing. Patient was first evaluated at a local fire station. She reports associated shortness of breath but denies any nausea or history of heart illness. She reports she has never had symptoms like this in the past and reports no recent life stresses. Patient reports she is currently feeling better. She admits to smoking cigarettes and using marijuana, but denies drinking alcohol. She reports her last menstrual period began August 6th. Her grandfather has a history of multiple CVAs and COPD, however no other significant family history.    Patient reports all symptoms are resolved at this time.      REVIEW OF SYSTEMS  Pertinent positives include chest palpitations, chest pressure, dyspnea. Pertinent negatives include no nausea.  All other systems reviewed and negative.    PAST MEDICAL HISTORY   has a past medical history of Heart burn; Indigestion; Renal disorder (1/2011); and Thyroid disease.    SURGICAL HISTORY   has a past surgical history that includes elbow orif (1991); tonsillectomy and adenoidectomy (as  "child); gastroscopy-endo (10/25/2013); and egd with asp/bx (10/25/2013).    SOCIAL HISTORY  Social History   Substance Use Topics   • Smoking status: Current Every Day Smoker     Packs/day: 0.50     Years: 10.00     Types: Cigarettes   • Smokeless tobacco: Never Used      Comment: one pack every other day   • Alcohol use 0.5 oz/week     1 Glasses of wine per week      Comment: twice a year      History   Drug Use     Comment: marijuana on occasion       FAMILY HISTORY  Family History   Problem Relation Age of Onset   • Cancer Mother         thyroid ca   • Other Father         healthy   • Stroke Unknown    • Diabetes Unknown    • Heart Disease Unknown    • Hypertension Unknown        CURRENT MEDICATIONS  Home Medications     Reviewed by Pritesh tSuart R.N. (Registered Nurse) on 09/08/18 at 1510  Med List Status: Complete   Medication Last Dose Status   SYNTHROID 112 MCG Tab 7/16/2018 Active                ALLERGIES  Allergies   Allergen Reactions   • Iodine Anaphylaxis and Hives       PHYSICAL EXAM  VITAL SIGNS: /59   Pulse 84   Temp 36.1 °C (96.9 °F)   Resp 18   Ht 1.651 m (5' 5\")   Wt 56.7 kg (125 lb)   BMI 20.80 kg/m²     Vital signs reviewed.  Constitutional:  Appears well-developed and well-nourished. No distress.   Head: Normocephalic.   Eyes: EOM are normal. Pupils are equal, round, and reactive to light.   Neck: Normal range of motion.   Cardiovascular: Normal rate, regular rhythm and normal heart sounds.    Pulmonary/Chest: Effort normal and breath sounds normal. No wheezes.   Musculoskeletal: Exhibits no edema.   Neurological: Patient is alert and oriented to person, place, and time.   Skin: Skin is warm and dry.     LABS  Results for orders placed or performed during the hospital encounter of 09/08/18   Troponin   Result Value Ref Range    Troponin I <0.01 0.00 - 0.04 ng/mL   Btype Natriuretic Peptide   Result Value Ref Range    B Natriuretic Peptide 18 0 - 100 pg/mL   CBC with Differential "   Result Value Ref Range    WBC 8.9 4.8 - 10.8 K/uL    RBC 3.71 (L) 4.20 - 5.40 M/uL    Hemoglobin 12.2 12.0 - 16.0 g/dL    Hematocrit 34.4 (L) 37.0 - 47.0 %    MCV 92.7 81.4 - 97.8 fL    MCH 32.9 27.0 - 33.0 pg    MCHC 35.5 (H) 33.6 - 35.0 g/dL    RDW 42.6 35.9 - 50.0 fL    Platelet Count 176 164 - 446 K/uL    MPV 12.1 9.0 - 12.9 fL    Neutrophils-Polys 57.10 44.00 - 72.00 %    Lymphocytes 35.10 22.00 - 41.00 %    Monocytes 6.00 0.00 - 13.40 %    Eosinophils 1.00 0.00 - 6.90 %    Basophils 0.40 0.00 - 1.80 %    Immature Granulocytes 0.40 0.00 - 0.90 %    Nucleated RBC 0.00 /100 WBC    Neutrophils (Absolute) 5.09 2.00 - 7.15 K/uL    Lymphs (Absolute) 3.14 1.00 - 4.80 K/uL    Monos (Absolute) 0.54 0.00 - 0.85 K/uL    Eos (Absolute) 0.09 0.00 - 0.51 K/uL    Baso (Absolute) 0.04 0.00 - 0.12 K/uL    Immature Granulocytes (abs) 0.04 0.00 - 0.11 K/uL    NRBC (Absolute) 0.00 K/uL   Complete Metabolic Panel (CMP)   Result Value Ref Range    Sodium 137 135 - 145 mmol/L    Potassium 3.7 3.6 - 5.5 mmol/L    Chloride 109 96 - 112 mmol/L    Co2 22 20 - 33 mmol/L    Anion Gap 6.0 0.0 - 11.9    Glucose 136 (H) 65 - 99 mg/dL    Bun 10 8 - 22 mg/dL    Creatinine 0.70 0.50 - 1.40 mg/dL    Calcium 8.7 8.5 - 10.5 mg/dL    AST(SGOT) 14 12 - 45 U/L    ALT(SGPT) 12 2 - 50 U/L    Alkaline Phosphatase 40 30 - 99 U/L    Total Bilirubin 0.3 0.1 - 1.5 mg/dL    Albumin 3.9 3.2 - 4.9 g/dL    Total Protein 6.2 6.0 - 8.2 g/dL    Globulin 2.3 1.9 - 3.5 g/dL    A-G Ratio 1.7 g/dL   Prothrombin Time   Result Value Ref Range    PT 14.5 12.0 - 14.6 sec    INR 1.16 (H) 0.87 - 1.13   APTT   Result Value Ref Range    APTT 29.0 24.7 - 36.0 sec   Lipase   Result Value Ref Range    Lipase 36 11 - 82 U/L   ESTIMATED GFR   Result Value Ref Range    GFR If African American >60 >60 mL/min/1.73 m 2    GFR If Non African American >60 >60 mL/min/1.73 m 2   D-DIMER   Result Value Ref Range    D-Dimer Screen <200 <250 ng/mL(D-DU)   BETA-HCG QUALITATIVE SERUM   Result  Value Ref Range    Beta-Hcg Qualitative Serum Negative Negative   EKG (ER)   Result Value Ref Range    Report       [facility] Emergency Dept.    Test Date:  2018  Pt Name:    SOPHY ESQUIVEL                Department: ER  MRN:        [id number]                      Room:       BL 22  Gender:     Female                       Technician: [id number]  :        1985                   Requested By:ER TRIAGE PROTOCOL  Order #:    [id number]                    Reading MD:    Measurements  Intervals                                Axis  Rate:       92                           P:          70  TN:         132                          QRS:        72  QRSD:       84                           T:          61  QT:         360  QTc:        446    Interpretive Statements  SINUS RHYTHM  CONSIDER LEFT ATRIAL ABNORMALITY  RSR' IN V1 OR V2, PROBABLY NORMAL VARIANT  BORDERLINE T ABNORMALITIES, ANT-LAT LEADS  No previous ECG available for comparison        All labs reviewed by me.    EKG  12 Lead EKG interpreted by me to show sinus rhythm at 90. Normal P waves.  R/R' in V2. Normal ST segments. Normal T waves. Normal EKG showing incomplete RBBB with normal variant no acute change.    RADIOLOGY  DX-CHEST-LIMITED (1 VIEW)   Final Result         No acute cardiopulmonary abnormalities are identified.        The radiologist's interpretation of all radiological studies have been reviewed by me.    COURSE & MEDICAL DECISION MAKING  Pertinent Labs & Imaging studies reviewed. (See chart for details) The patient's [facility] Nursing and past medical  records were reviewed    3:45 PM - Patient seen and examined at bedside. Ordered Chest xray, estimated GFR, Troponin, BNP, CBC, CMP, Prothrombin, APTT, Lipase, EKG to evaluate her symptoms. The differential diagnoses include but are not limited to: thyroid dysfunction, stress, anxiety, metabolic.    5:54 PM I informed the patient of her work up results explaining that there are no  acute processes indicated at this time.  I explained that palpitations can be hard to find the origin of, explaining that this could have been an isolated incident. Patient is stable for discharge with strict return precautions and advised to follow up with Cardiology through her PCP. Patient understands and agrees with discharge at this time.     The patient will return for new or worsening symptoms and is stable at the time of discharge.    DISPOSITION:  Patient will be discharged home in stable condition.    FOLLOW UP:  JITENDRA GoodmanNLinnea  [address]  [facility] [id number] [phone]    In 1 week  As needed    FINAL IMPRESSION  1. Palpitations       C.   Rosario BOB (Scribe), am scribing for, and in the presence of, Dima Huang M.D..    Electronically signed by: Rosario Winston (Scribe), 9/8/2018    IDima M.D. personally performed the services described in this documentation, as scribed by Rosario Winston in my presence, and it is both accurate and complete.    The note accurately reflects work and decisions made by me.  Dima Huang  9/8/2018  6:57 PM

## 2018-09-09 NOTE — DISCHARGE INSTRUCTIONS
Palpitations  A palpitation is the feeling that your heartbeat is irregular or is faster than normal. It may feel like your heart is fluttering or skipping a beat. Palpitations are usually not a serious problem. They may be caused by many things, including smoking, caffeine, alcohol, stress, and certain medicines. Although most causes of palpitations are not serious, palpitations can be a sign of a serious medical problem. In some cases, you may need further medical evaluation.  Follow these instructions at home:  Pay attention to any changes in your symptoms. Take these actions to help with your condition:  · Avoid the following:  ¨ Caffeinated coffee, tea, soft drinks, diet pills, and energy drinks.  ¨ Chocolate.  ¨ Alcohol.  · Do not use any tobacco products, such as cigarettes, chewing tobacco, and e-cigarettes. If you need help quitting, ask your health care provider.  · Try to reduce your stress and anxiety. Things that can help you relax include:  ¨ Yoga.  ¨ Meditation.  ¨ Physical activity, such as swimming, jogging, or walking.  ¨ Biofeedback. This is a method that helps you learn to use your mind to control things in your body, such as your heartbeats.  · Get plenty of rest and sleep.  · Take over-the-counter and prescription medicines only as told by your health care provider.  · Keep all follow-up visits as told by your health care provider. This is important.  Contact a health care provider if:  · You continue to have a fast or irregular heartbeat after 24 hours.  · Your palpitations occur more often.  Get help right away if:  · You have chest pain or shortness of breath.  · You have a severe headache.  · You feel dizzy or you faint.  This information is not intended to replace advice given to you by your health care provider. Make sure you discuss any questions you have with your health care provider.  Document Released: 12/15/2001 Document Revised: 05/22/2017 Document Reviewed: 09/01/2016  [employer]  Interactive Patient Education © 2017 [employer]      Holter Monitoring  A Holter monitor is a small device that is used to detect abnormal heart rhythms. It clips to your clothing and is connected by wires to flat, sticky disks (electrodes) that attach to your chest. It is worn continuously for 24-48 hours.  HOME CARE INSTRUCTIONS  · Wear your Holter monitor at all times, even while exercising and sleeping, for as long as directed by your health care provider.  · Make sure that the Holter monitor is safely clipped to your clothing or close to your body as recommended by your health care provider.  · Do not get the monitor or wires wet.  · Do not put body lotion or moisturizer on your chest.  · Keep your skin clean.  · Keep a diary of your daily activities, such as walking and doing chores. If you feel that your heartbeat is abnormal or that your heart is fluttering or skipping a beat:  ¨ Record what you are doing when it happens.  ¨ Record what time of day the symptoms occur.  · Return your Holter monitor as directed by your health care provider.  · Keep all follow-up visits as directed by your health care provider. This is important.  SEEK IMMEDIATE MEDICAL CARE IF:  · You feel lightheaded or you faint.  · You have trouble breathing.  · You feel pain in your chest, upper arm, or jaw.  · You feel sick to your stomach and your skin is pale, cool, or damp.  · You heartbeat feels unusual or abnormal.     This information is not intended to replace advice given to you by your health care provider. Make sure you discuss any questions you have with your health care provider.     Document Released: 09/15/2005 Document Revised: 01/08/2016 Document Reviewed: 07/27/2015  [employer] Interactive Patient Education ©2016 [employer]

## 2018-09-10 DIAGNOSIS — E03.9 ACQUIRED HYPOTHYROIDISM: ICD-10-CM

## 2018-09-19 ENCOUNTER — OFFICE VISIT (OUTPATIENT)
Dept: MEDICAL GROUP | Facility: MEDICAL CENTER | Age: 33
End: 2018-09-19
Payer: COMMERCIAL

## 2018-09-19 ENCOUNTER — TELEPHONE (OUTPATIENT)
Dept: MEDICAL GROUP | Facility: MEDICAL CENTER | Age: 33
End: 2018-09-19

## 2018-09-19 VITALS
DIASTOLIC BLOOD PRESSURE: 62 MMHG | BODY MASS INDEX: 20.33 KG/M2 | RESPIRATION RATE: 16 BRPM | WEIGHT: 122 LBS | HEIGHT: 65 IN | OXYGEN SATURATION: 99 % | TEMPERATURE: 98.8 F | SYSTOLIC BLOOD PRESSURE: 100 MMHG | HEART RATE: 86 BPM

## 2018-09-19 DIAGNOSIS — F41.9 ANXIETY: ICD-10-CM

## 2018-09-19 DIAGNOSIS — E03.9 HYPOTHYROIDISM, UNSPECIFIED TYPE: ICD-10-CM

## 2018-09-19 DIAGNOSIS — Z23 INFLUENZA VACCINE NEEDED: ICD-10-CM

## 2018-09-19 PROBLEM — R10.13 DYSPEPSIA: Status: RESOLVED | Noted: 2017-02-15 | Resolved: 2018-09-19

## 2018-09-19 PROCEDURE — 90686 IIV4 VACC NO PRSV 0.5 ML IM: CPT | Performed by: NURSE PRACTITIONER

## 2018-09-19 PROCEDURE — 99214 OFFICE O/P EST MOD 30 MIN: CPT | Mod: 25 | Performed by: NURSE PRACTITIONER

## 2018-09-19 PROCEDURE — 90471 IMMUNIZATION ADMIN: CPT | Performed by: NURSE PRACTITIONER

## 2018-09-19 ASSESSMENT — ENCOUNTER SYMPTOMS
CARDIOVASCULAR NEGATIVE: 1
GASTROINTESTINAL NEGATIVE: 1
NEUROLOGICAL NEGATIVE: 1
MUSCULOSKELETAL NEGATIVE: 1
RESPIRATORY NEGATIVE: 1
EYES NEGATIVE: 1
NERVOUS/ANXIOUS: 1
CONSTITUTIONAL NEGATIVE: 1
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
## 2018-10-16 RX ORDER — LEVOTHYROXINE SODIUM 112 MCG
TABLET ORAL
Qty: 90 TAB | Refills: 1 | Status: SHIPPED | OUTPATIENT
Start: 2018-10-16 | End: 2018-10-30

## 2018-10-29 ENCOUNTER — HOSPITAL ENCOUNTER (OUTPATIENT)
Dept: LAB | Facility: MEDICAL CENTER | Age: 33
End: 2018-10-29
Attending: INTERNAL MEDICINE
Payer: COMMERCIAL

## 2018-10-29 ENCOUNTER — OFFICE VISIT (OUTPATIENT)
Dept: ENDOCRINOLOGY | Facility: MEDICAL CENTER | Age: 33
End: 2018-10-29
Payer: COMMERCIAL

## 2018-10-29 VITALS
SYSTOLIC BLOOD PRESSURE: 83 MMHG | WEIGHT: 126 LBS | HEIGHT: 65 IN | DIASTOLIC BLOOD PRESSURE: 65 MMHG | OXYGEN SATURATION: 97 % | BODY MASS INDEX: 20.99 KG/M2 | HEART RATE: 93 BPM

## 2018-10-29 DIAGNOSIS — E03.9 ACQUIRED HYPOTHYROIDISM: ICD-10-CM

## 2018-10-29 LAB
T4 FREE SERPL-MCNC: 1.23 NG/DL (ref 0.53–1.43)
TSH SERPL DL<=0.005 MIU/L-ACNC: 0.22 UIU/ML (ref 0.38–5.33)

## 2018-10-29 PROCEDURE — 99213 OFFICE O/P EST LOW 20 MIN: CPT | Performed by: INTERNAL MEDICINE

## 2018-10-29 PROCEDURE — 84443 ASSAY THYROID STIM HORMONE: CPT

## 2018-10-29 PROCEDURE — 84439 ASSAY OF FREE THYROXINE: CPT

## 2018-10-29 PROCEDURE — 36415 COLL VENOUS BLD VENIPUNCTURE: CPT

## 2018-10-29 NOTE — PROGRESS NOTES
"Endocrinology Clinic Progress Note    CC: Hypothyroidism    HPI:  Roxane Toth is a 33 y.o. old patient who comes in today for routine follow up.  She is currently on Synthroid 112 mcg 1 tablet daily except on Sundays she takes half a tablet.  She denies any issues with unintentional weight loss.  No palpitations or racing heart.  She complains of hair loss.    ROS:  Endo: Positive for hair loss    EXAM:  Vital signs: BP (!) 83/65 (BP Location: Right arm, Patient Position: Sitting)   Pulse 93   Ht 1.651 m (5' 5\")   Wt 57.2 kg (126 lb)   SpO2 97%   BMI 20.97 kg/m²   General: No apparent distress, cooperative  Eyes: No scleral icterus, no discharge  Resp: Normal effort  Extremities: No lower extremity edema  Psych: Alert and oriented, normal mood and affect    Assessment and Plan:    1. Acquired hypothyroidism  · She had labs earlier today for TSH and free T4, lab results are pending, I will send her a Live[employer] message when the labs are back  · For now she will continue current regimen of Synthroid, no changes    Return in about 3 months (around 1/29/2019).    Thank you for allowing me to participate in the care of this patient.    Jamar Doty M.D.    CC:   DONNY Goodman.PTARA.    This note was created using voice recognition software (Dragon). The accuracy of the dictation is limited by the abilities of the software. I have reviewed the note prior to signing, however some errors in grammar and context are still possible. If you have any questions related to this note please do not hesitate to contact our office.       "

## 2018-10-30 DIAGNOSIS — E03.9 ACQUIRED HYPOTHYROIDISM: ICD-10-CM

## 2018-10-30 RX ORDER — LEVOTHYROXINE SODIUM 100 MCG
100 TABLET ORAL
Qty: 90 TAB | Refills: 1 | Status: SHIPPED | OUTPATIENT
Start: 2018-10-30 | End: 2019-05-06 | Stop reason: SDUPTHER

## 2018-11-27 DIAGNOSIS — E03.9 ACQUIRED HYPOTHYROIDISM: ICD-10-CM

## 2018-11-28 ENCOUNTER — HOSPITAL ENCOUNTER (OUTPATIENT)
Dept: LAB | Facility: MEDICAL CENTER | Age: 33
End: 2018-11-28
Attending: INTERNAL MEDICINE
Payer: COMMERCIAL

## 2018-11-28 DIAGNOSIS — E03.9 ACQUIRED HYPOTHYROIDISM: ICD-10-CM

## 2018-11-28 LAB
T4 FREE SERPL-MCNC: 1.02 NG/DL (ref 0.53–1.43)
TSH SERPL DL<=0.005 MIU/L-ACNC: 0.41 UIU/ML (ref 0.38–5.33)

## 2018-11-28 PROCEDURE — 84443 ASSAY THYROID STIM HORMONE: CPT

## 2018-11-28 PROCEDURE — 84439 ASSAY OF FREE THYROXINE: CPT

## 2018-11-28 PROCEDURE — 36415 COLL VENOUS BLD VENIPUNCTURE: CPT

## 2018-12-12 ENCOUNTER — APPOINTMENT (OUTPATIENT)
Dept: BEHAVIORAL HEALTH | Facility: CLINIC | Age: 33
End: 2018-12-12
Payer: COMMERCIAL

## 2018-12-19 ENCOUNTER — OFFICE VISIT (OUTPATIENT)
Dept: ENDOCRINOLOGY | Facility: MEDICAL CENTER | Age: 33
End: 2018-12-19
Payer: COMMERCIAL

## 2018-12-19 VITALS
OXYGEN SATURATION: 96 % | WEIGHT: 126 LBS | SYSTOLIC BLOOD PRESSURE: 104 MMHG | BODY MASS INDEX: 20.99 KG/M2 | HEART RATE: 89 BPM | DIASTOLIC BLOOD PRESSURE: 64 MMHG | RESPIRATION RATE: 15 BRPM | HEIGHT: 65 IN

## 2018-12-19 DIAGNOSIS — E03.9 HYPOTHYROIDISM, UNSPECIFIED TYPE: ICD-10-CM

## 2018-12-19 DIAGNOSIS — Z80.8 FAMILY HISTORY OF THYROID CANCER: ICD-10-CM

## 2018-12-19 PROCEDURE — 99213 OFFICE O/P EST LOW 20 MIN: CPT | Performed by: PHYSICIAN ASSISTANT

## 2018-12-19 RX ORDER — PANTOPRAZOLE SODIUM 40 MG/1
40 TABLET, DELAYED RELEASE ORAL DAILY
Qty: 30 TAB | Refills: 0
Start: 2018-12-19 | End: 2019-06-11 | Stop reason: SDUPTHER

## 2018-12-19 NOTE — PROGRESS NOTES
New Patient Consult Note  Referred by: JOSEPH Goodman      HPI:  Roxane Toth is a  33 y.o. old patient who comes in today for evaluation and management of the followin. Hypothyroidism, unspecified type  Synthroid 100mcg daily     Symptoms of hair loss, irritable, palpations for the last 1 month. Patient thinks symptoms started right after starting the 100 mcg     Previously on 112 mcg decreased     2. Family history of thyroid cancer  familiy history: Mother     US:   2017-    1.  Thyroid gland again appears heterogeneous with appearance that is unchanged compared to the prior exam.    2.  No mass or nodule has developed since the prior exam.    ROS:  Constitutional: No weight loss or gain,  fever  HEENT: No difficulty with swallowing, change in voice, or swelling in throat area   Cardiac: No chest pain, palpitations, or racing heart  Resp: No shortness of breath  GI: No abdominal pain, nausea, vomiting, or diarrhea   Neuro: No numbness or tinging in feet  Endo: No heat or cold intolerance, no polyuria or polydipsia  All other systems were reviewed and were negative.    Past Medical History:  Patient Active Problem List    Diagnosis Date Noted   • Hot flashes 2017   • Family history of thyroid cancer 2015   • Hypothyroidism 2015   • Migraine 2015       Past Surgical History:  Past Surgical History:   Procedure Laterality Date   • GASTROSCOPY-ENDO  10/25/2013    Performed by Zohaib Felix M.D. at [facility]   • EGD WITH ASP/BX  10/25/2013    Performed by Zohaib Felix M.D. at [facility]   • ELBOW ORIF      right elbow   • TONSILLECTOMY AND ADENOIDECTOMY  as child       Allergies:  Iodine    Social History:  Social History     Social History   • Marital status:      Spouse name: N/A   • Number of children: N/A   • Years of education: N/A     Occupational History   • Not on file.     Social History Main  "Topics   • Smoking status: Current Every Day Smoker     Packs/day: 0.50     Years: 10.00     Types: Cigarettes   • Smokeless tobacco: Never Used      Comment: one pack every other day   • Alcohol use 0.5 oz/week     1 Glasses of wine per week      Comment: twice a year   • Drug use: Yes      Comment: marijuana on occasion   • Sexual activity: Yes     Partners: Male     Birth control/ protection: Pill     Other Topics Concern   • Not on file     Social History Narrative   • No narrative on file       Family History:  Family History   Problem Relation Age of Onset   • Cancer Mother         thyroid ca   • Other Father         healthy   • Stroke Unknown    • Diabetes Unknown    • Heart Disease Unknown    • Hypertension Unknown        Medications:    Current Outpatient Prescriptions:   •  pantoprazole (PROTONIX) 40 MG Tablet Delayed Response, Take 1 Tab by mouth every day., Disp: 30 Tab, Rfl: 0  •  SYNTHROID 100 MCG Tab, Take 1 Tab by mouth Every morning on an empty stomach., Disp: 90 Tab, Rfl: 1    Labs: Reviewed (as above)     Physical Examination:  Vital signs: /64 (BP Location: Right arm, Patient Position: Sitting, BP Cuff Size: Adult)   Pulse 89   Resp 15   Ht 1.651 m (5' 5\")   Wt 57.2 kg (126 lb)   SpO2 96%   BMI 20.97 kg/m²  Body mass index is 20.97 kg/m².  General: No apparent distress, cooperative, no facial hair    Eyes: No scleral icterus or discharge, no nystagmus  ENMT: Normal on external inspection of nose, lips, normal thyroid exam  Neck: No abnormal masses on inspection or palpations. no bruits noted   Resp: Normal effort, clear to auscultation bilaterally without wheezes, rales or rhonchi  CVS: Regular rate and rhythm, S1 S2 normal, no murmur, rubs or gallops    Extremities: No edema.  Neuro: Alert and oriented  Psych: Normal mood and affect, intact memory and able to make informed decisions    Assessment and Plan:    1. Hypothyroidism, unspecified type  Synthroid 100 mcg daily x 6 days and " day #7 1/2 tablet   Recheck labs in 3 months       2. Family history of thyroid cancer  Mother with hx of thyroid cancer       Return in about 2 months (around 2/19/2019).     Thank you for allowing me to participate in the care of this patient.  If you have any questions or concerns please do not hesitate to contact me.    Marlen Gooden P.A.-C.  12/19/18    CC:   DONNY Goodman.PTARA.    This note was created using voice recognition software (Dragon). The accuracy of the dictation is limited by the abilities of the software. I have reviewed the note prior to signing, however some errors in grammar and context are still possible. If you have any questions related to this note please do not hesitate to contact our office.

## 2019-01-04 ENCOUNTER — PATIENT MESSAGE (OUTPATIENT)
Dept: ENDOCRINOLOGY | Facility: MEDICAL CENTER | Age: 34
End: 2019-01-04

## 2019-01-04 NOTE — TELEPHONE ENCOUNTER
From: Roxane Toth  To: Marlen Gooden P.A.-C.  Sent: 1/4/2019 7:37 AM PST  Subject: Non-Urgent Medical Question    Pa Gee, over the last week or two I have been feeling really bad. I have absolutely no energy at all and my appetite is terrible and I am having mood swings. I am having a very hard time eating as I cant seem to force myself to eat. Nothing sounds good and I just don't want to eat at all. I haven't weighed myself but I am sure I have lost weight as my hip bones are sticking out now and my face has slimmed a bit. I am very close to my menstrual cycle as well. I don't know what to do. Is there any advice you have or any suggestions on what I can do? Thank you so much.

## 2019-01-05 NOTE — PATIENT COMMUNICATION
1/4/2019 4:20 PM.  Patient states that she is feeling worse since decreasing her dose on her last evaluation.  She is complaining of increasing fatigue worsening of hair loss increase in weight loss increase in irritability and mood swings.  I discussed that after her last appointment we were trying to target a TSH around 1.  Her TSH was 0.4 which is within normal limits.  We discussed she can have her labs done earlier but we would need to know that this is not 100% accurate as she has not been on her current regimen for 6 weeks time.  I have also encouraged her to follow-up with her primary care doctor for other etiologies of her symptoms.    Marlen Gooden PA-C

## 2019-01-05 NOTE — TELEPHONE ENCOUNTER
Regarding: Non-Urgent Medical Question  Contact: [phone]  ----- Message from Luiz Kelly Ass't sent at 1/4/2019  8:29 AM PST -----       ----- Message from Roxane Toth to Marlen Gooden P.A.-C. sent at 1/4/2019  7:37 AM -----   Pa Gee, over the last week or two I have been feeling really bad. I have absolutely no energy at all and my appetite is terrible and I am having mood swings. I am having a very hard time eating as I cant seem to force myself to eat. Nothing sounds good and I just don't want to eat at all. I haven't weighed myself but I am sure I have lost weight as my hip bones are sticking out now and my face has slimmed a bit. I am very close to my menstrual cycle as well. I don't know what to do. Is there any advice you have or any suggestions on what I can do? Thank you so much.

## 2019-01-05 NOTE — TELEPHONE ENCOUNTER
Regarding: Non-Urgent Medical Question  Contact: [phone]  ----- Message from Luiz Kelly Ass't sent at 1/4/2019  8:29 AM PST -----       ----- Message from Roxane Toth to Marlen Gooden P.A.-C. sent at 1/4/2019  7:37 AM -----   Pa Gee, over the last week or two I have been feeling really bad. I have absolutely no energy at all and my appetite is terrible and I am having mood swings. I am having a very hard time eating as I cant seem to force myself to eat. Nothing sounds good and I just don't want to eat at all. I haven't weighed myself but I am sure I have lost weight as my hip bones are sticking out now and my face has slimmed a bit. I am very close to my menstrual cycle as well. I don't know what to do. Is there any advice you have or any suggestions on what I can do? Thank you so much.

## 2019-01-16 ENCOUNTER — OFFICE VISIT (OUTPATIENT)
Dept: MEDICAL GROUP | Facility: MEDICAL CENTER | Age: 34
End: 2019-01-16
Payer: COMMERCIAL

## 2019-01-16 VITALS
WEIGHT: 120 LBS | HEIGHT: 65 IN | SYSTOLIC BLOOD PRESSURE: 118 MMHG | OXYGEN SATURATION: 97 % | BODY MASS INDEX: 19.99 KG/M2 | RESPIRATION RATE: 16 BRPM | HEART RATE: 83 BPM | DIASTOLIC BLOOD PRESSURE: 72 MMHG

## 2019-01-16 DIAGNOSIS — E03.9 HYPOTHYROIDISM, UNSPECIFIED TYPE: ICD-10-CM

## 2019-01-16 DIAGNOSIS — Z72.0 TOBACCO ABUSE: ICD-10-CM

## 2019-01-16 DIAGNOSIS — R53.83 FATIGUE, UNSPECIFIED TYPE: ICD-10-CM

## 2019-01-16 PROCEDURE — 99213 OFFICE O/P EST LOW 20 MIN: CPT | Performed by: NURSE PRACTITIONER

## 2019-01-16 ASSESSMENT — PATIENT HEALTH QUESTIONNAIRE - PHQ9: CLINICAL INTERPRETATION OF PHQ2 SCORE: 0

## 2019-01-16 NOTE — PROGRESS NOTES
Subjective:      Roxane Toth is a 34 y.o. female who presents with Other (would like to quit smoking)        CC: Patient here today to discuss fatigue and smoking cessation.    HPI Roxane Toth      1. Fatigue, unspecified type  Patient reports that she has been feeling more fatigued than usual over the past few weeks.  She also thinks she has having hair loss and weight loss.  She does have hypothyroidism and had some changes in the dosing of her levothyroxine but her last TSH was therapeutic.  She had lab workup at the hospital in September  With normal nonfasting chemistry panel and normal white blood cell count with borderline anemia.  She also states her menstrual period was slightly irregular this month.    2. Tobacco abuse  Patient reports she has been smoking a pack a day of cigarettes since she was 17 years of age.  She has decreased on her own to 8 cigarettes a day but is having difficulty totally stopping.  She would like to talk about other methods of quitting smoking then going cold turkey.    3. Hypothyroidism, unspecified type  Patient has established with a new endocrinology medical provider and has an appointment in the next month.  Her last TSH was 0.4 on levothyroxine 100 mcg a day.  Social History   Substance Use Topics   • Smoking status: Current Every Day Smoker     Packs/day: 1.00     Years: 17.00     Types: Cigarettes   • Smokeless tobacco: Never Used      Comment: one pack every other day   • Alcohol use 0.5 oz/week     1 Glasses of wine per week      Comment: twice a year     Current Outpatient Prescriptions   Medication Sig Dispense Refill   • pantoprazole (PROTONIX) 40 MG Tablet Delayed Response Take 1 Tab by mouth every day. 30 Tab 0   • SYNTHROID 100 MCG Tab Take 1 Tab by mouth Every morning on an empty stomach. 90 Tab 1     No current facility-administered medications for this visit.      Past Medical History:   Diagnosis Date   • Heart burn    • Indigestion    •  "Renal disorder 1/2011    kidney infection   • Thyroid disease      Family History   Problem Relation Age of Onset   • Cancer Mother         thyroid ca   • Other Father         healthy   • Stroke Unknown    • Diabetes Unknown    • Heart Disease Unknown    • Hypertension Unknown        Review of Systems   Constitutional: Positive for malaise/fatigue.   All other systems reviewed and are negative.         Objective:     /72 (BP Location: Right arm, Patient Position: Sitting, BP Cuff Size: Adult)   Pulse 83   Resp 16   Ht 1.651 m (5' 5\")   Wt 54.4 kg (120 lb)   SpO2 97%   BMI 19.97 kg/m²      Physical Exam   Constitutional: She is oriented to person, place, and time. She appears well-developed and well-nourished. No distress.   HENT:   Head: Normocephalic and atraumatic.   Right Ear: External ear normal.   Left Ear: External ear normal.   Nose: Nose normal.   Eyes: Right eye exhibits no discharge. Left eye exhibits no discharge.   Neck: Normal range of motion. Neck supple. No thyromegaly present.   Cardiovascular: Normal rate, regular rhythm and normal heart sounds.  Exam reveals no gallop and no friction rub.    No murmur heard.  Pulmonary/Chest: Effort normal and breath sounds normal. She has no wheezes. She has no rales.   Musculoskeletal: She exhibits no edema or tenderness.   Neurological: She is alert and oriented to person, place, and time. She displays normal reflexes.   Skin: Skin is warm and dry. No rash noted. She is not diaphoretic.   Psychiatric: She has a normal mood and affect. Her behavior is normal. Judgment and thought content normal.   Nursing note and vitals reviewed.              Assessment/Plan:     1. Fatigue, unspecified type  Patient symptoms sound more thyroid related and she was advised to follow-up with endocrinology as she plans on doing.  She requested some vitamin checks which I will do today and I will also check for anemia and diabetes.  - VITAMIN D,25 HYDROXY; Future  - " VITAMIN B12; Future  - COMP METABOLIC PANEL; Future  - CBC WITHOUT DIFFERENTIAL; Future    2. Tobacco abuse  Reviewed patient options including Chantix or quitting cold turkey.  She would prefer to go with nicotine replacement patches which I explained over-the-counter.  She can also discuss this with her insurance if she would like to get into smoking cessation program.    3. Hypothyroidism, unspecified type  Patient will follow with endocrinology.

## 2019-01-19 ENCOUNTER — HOSPITAL ENCOUNTER (OUTPATIENT)
Dept: LAB | Facility: MEDICAL CENTER | Age: 34
End: 2019-01-19
Attending: PHYSICIAN ASSISTANT
Payer: COMMERCIAL

## 2019-01-19 ENCOUNTER — HOSPITAL ENCOUNTER (OUTPATIENT)
Dept: LAB | Facility: MEDICAL CENTER | Age: 34
End: 2019-01-19
Attending: NURSE PRACTITIONER
Payer: COMMERCIAL

## 2019-01-19 DIAGNOSIS — E03.9 HYPOTHYROIDISM, UNSPECIFIED TYPE: ICD-10-CM

## 2019-01-19 DIAGNOSIS — R53.83 FATIGUE, UNSPECIFIED TYPE: ICD-10-CM

## 2019-01-19 LAB
25(OH)D3 SERPL-MCNC: 16 NG/ML (ref 30–100)
ALBUMIN SERPL BCP-MCNC: 4.5 G/DL (ref 3.2–4.9)
ALBUMIN/GLOB SERPL: 1.6 G/DL
ALP SERPL-CCNC: 48 U/L (ref 30–99)
ALT SERPL-CCNC: 12 U/L (ref 2–50)
ANION GAP SERPL CALC-SCNC: 8 MMOL/L (ref 0–11.9)
AST SERPL-CCNC: 18 U/L (ref 12–45)
BILIRUB SERPL-MCNC: 0.7 MG/DL (ref 0.1–1.5)
BUN SERPL-MCNC: 10 MG/DL (ref 8–22)
CALCIUM SERPL-MCNC: 9.8 MG/DL (ref 8.5–10.5)
CHLORIDE SERPL-SCNC: 105 MMOL/L (ref 96–112)
CO2 SERPL-SCNC: 25 MMOL/L (ref 20–33)
CREAT SERPL-MCNC: 0.73 MG/DL (ref 0.5–1.4)
ERYTHROCYTE [DISTWIDTH] IN BLOOD BY AUTOMATED COUNT: 44.3 FL (ref 35.9–50)
FASTING STATUS PATIENT QL REPORTED: NORMAL
GLOBULIN SER CALC-MCNC: 2.8 G/DL (ref 1.9–3.5)
GLUCOSE SERPL-MCNC: 83 MG/DL (ref 65–99)
HCT VFR BLD AUTO: 35.2 % (ref 37–47)
HGB BLD-MCNC: 11.6 G/DL (ref 12–16)
MCH RBC QN AUTO: 28.9 PG (ref 27–33)
MCHC RBC AUTO-ENTMCNC: 33 G/DL (ref 33.6–35)
MCV RBC AUTO: 87.8 FL (ref 81.4–97.8)
PLATELET # BLD AUTO: 269 K/UL (ref 164–446)
PMV BLD AUTO: 12 FL (ref 9–12.9)
POTASSIUM SERPL-SCNC: 3.7 MMOL/L (ref 3.6–5.5)
PROT SERPL-MCNC: 7.3 G/DL (ref 6–8.2)
RBC # BLD AUTO: 4.01 M/UL (ref 4.2–5.4)
SODIUM SERPL-SCNC: 138 MMOL/L (ref 135–145)
T4 FREE SERPL-MCNC: 1.12 NG/DL (ref 0.53–1.43)
TSH SERPL DL<=0.005 MIU/L-ACNC: 1.55 UIU/ML (ref 0.38–5.33)
VIT B12 SERPL-MCNC: 250 PG/ML (ref 211–911)
WBC # BLD AUTO: 6.9 K/UL (ref 4.8–10.8)

## 2019-01-19 PROCEDURE — 80053 COMPREHEN METABOLIC PANEL: CPT

## 2019-01-19 PROCEDURE — 82607 VITAMIN B-12: CPT

## 2019-01-19 PROCEDURE — 82306 VITAMIN D 25 HYDROXY: CPT

## 2019-01-19 PROCEDURE — 84443 ASSAY THYROID STIM HORMONE: CPT

## 2019-01-19 PROCEDURE — 36415 COLL VENOUS BLD VENIPUNCTURE: CPT

## 2019-01-19 PROCEDURE — 85027 COMPLETE CBC AUTOMATED: CPT

## 2019-01-19 PROCEDURE — 84439 ASSAY OF FREE THYROXINE: CPT

## 2019-02-20 ENCOUNTER — OFFICE VISIT (OUTPATIENT)
Dept: ENDOCRINOLOGY | Facility: MEDICAL CENTER | Age: 34
End: 2019-02-20
Payer: COMMERCIAL

## 2019-02-20 VITALS
BODY MASS INDEX: 20.99 KG/M2 | WEIGHT: 126 LBS | DIASTOLIC BLOOD PRESSURE: 60 MMHG | HEIGHT: 65 IN | HEART RATE: 82 BPM | OXYGEN SATURATION: 97 % | SYSTOLIC BLOOD PRESSURE: 108 MMHG | RESPIRATION RATE: 16 BRPM

## 2019-02-20 DIAGNOSIS — E55.9 VITAMIN D DEFICIENCY: ICD-10-CM

## 2019-02-20 DIAGNOSIS — E03.9 HYPOTHYROIDISM, UNSPECIFIED TYPE: ICD-10-CM

## 2019-02-20 PROCEDURE — 99213 OFFICE O/P EST LOW 20 MIN: CPT | Performed by: PHYSICIAN ASSISTANT

## 2019-02-20 RX ORDER — ERGOCALCIFEROL 1.25 MG/1
50000 CAPSULE ORAL
Qty: 4 CAP | Refills: 3 | Status: SHIPPED | OUTPATIENT
Start: 2019-02-20 | End: 2019-06-11 | Stop reason: SDUPTHER

## 2019-02-20 NOTE — PATIENT INSTRUCTIONS
Vitamin D 33927 IU weekly   Synthroid daily x 6 days and 1/2 tablet once weekly   Labs in 3 months   F/u with PCP in re: H/H low

## 2019-02-20 NOTE — PROGRESS NOTES
"Endocrinology Clinic Progress Note      HPI:  Roxane Toth is a 34 y.o. old patient who comes in today for routine follow up.     1. Hypothyroidism, unspecified type  Currently on:  Synthroid 100 mcg 6 days a week half tablet day #7    Patient is currently feeling better.  She has a little bit more energy than prior.  She however does complain of continued hair loss despite change in her dose.    She denies any symptoms of choking and/or difficulty swallowing.    2. Vitamin D deficiency  Patient was started on vitamin D replacement at 5000 IUs daily.      ROS:  Constitutional: No fevers chills lightheadedness and/or dizziness.  Medications:    Current Outpatient Prescriptions:   •  vitamin D (Ergocalciferol), 50,000 Units, Oral, Q7 DAYS  •  pantoprazole, 40 mg, Oral, DAILY, 2/20/2019  •  SYNTHROID, 100 mcg, Oral, AM ES, 2/20/2019    EXAM:  Vital signs: /60 (BP Location: Right arm, Patient Position: Sitting, BP Cuff Size: Adult)   Pulse 82   Resp 16   Ht 1.651 m (5' 5\")   Wt 57.2 kg (126 lb)   SpO2 97%   BMI 20.97 kg/m²   General: No apparent distress, cooperative  Eyes: No scleral icterus, no discharge  Neck: thyroid enlargement no bruits   Resp: Normal effort  Psych: Alert and oriented, normal mood and affect    Assessment and Plan:    1. Hypothyroidism, unspecified type  Synthroid 100 mcg 6 days a week half tablet day #7  - TSH; Future  - FREE THYROXINE; Future    2. Vitamin D deficiency  rx for Vitamin D 05610 IU daily   - VITAMIN D,25 HYDROXY; Future    Return in about 3 months (around 5/20/2019).    Thank you for allowing me to participate in the care of this patient.    Marlen Gooden P.A.-C.    CC:   DONNY Goodman.[email or web address].    This note was created using voice recognition software (Dragon). The accuracy of the dictation is limited by the abilities of the software. I have reviewed the note prior to signing, however some errors in grammar and context are still possible. If you have " any questions related to this note please do not hesitate to contact our office.

## 2019-03-04 DIAGNOSIS — D50.9 IRON DEFICIENCY ANEMIA, UNSPECIFIED IRON DEFICIENCY ANEMIA TYPE: ICD-10-CM

## 2019-03-04 RX ORDER — PNV NO.95/FERROUS FUM/FOLIC AC 28MG-0.8MG
1 TABLET ORAL DAILY
Qty: 30 TAB | Refills: 11 | Status: SHIPPED | OUTPATIENT
Start: 2019-03-04 | End: 2019-05-22

## 2019-04-02 ENCOUNTER — HOSPITAL ENCOUNTER (OUTPATIENT)
Dept: LAB | Facility: MEDICAL CENTER | Age: 34
End: 2019-04-02
Attending: PHYSICIAN ASSISTANT
Payer: COMMERCIAL

## 2019-04-02 DIAGNOSIS — E03.9 ACQUIRED HYPOTHYROIDISM: ICD-10-CM

## 2019-04-02 LAB
T4 FREE SERPL-MCNC: 0.99 NG/DL (ref 0.53–1.43)
TSH SERPL DL<=0.005 MIU/L-ACNC: 2.95 UIU/ML (ref 0.38–5.33)

## 2019-04-02 PROCEDURE — 84439 ASSAY OF FREE THYROXINE: CPT

## 2019-04-02 PROCEDURE — 36415 COLL VENOUS BLD VENIPUNCTURE: CPT

## 2019-04-02 PROCEDURE — 84443 ASSAY THYROID STIM HORMONE: CPT

## 2019-04-05 ENCOUNTER — TELEPHONE (OUTPATIENT)
Dept: ENDOCRINOLOGY | Facility: MEDICAL CENTER | Age: 34
End: 2019-04-05

## 2019-04-05 NOTE — TELEPHONE ENCOUNTER
----- Message from Roxane Toth sent at 4/5/2019  7:04 AM PDT -----  Regarding: RE: Test Result Question  Contact: [phone]  I apologize I missed your call. I will call when the office opens to schedule an appt. I understand my levels are okay however I know my body and have had thyroid disease long enough to know it's not right for me. I am suffering from Insomnia, Period changes (2 periods in Jan, none in march), appetite issues, mood swings, decrease in sex drive, night sweats, hot flashes and have become very emotional for no reason. I have always been a really good sleeper and all of this has been happening over the course of 3 months, worsening with each day (around beginning of Jan) which my meds were changed in mid Dec. So I'd like to discuss medication changes when I see you as again, this doesn't feel right for me. Thank you.   ----- Message -----  From: Marlen Gooden P.A.-C.  Sent: 4/4/2019  8:14 PM PDT  To: Roxane Toth  Subject: RE: Test Result Question  We can try to squeeze you in next week.   Your labs are okay. They should not be causing Insomnia.     I tried to call you tonight - left message.   Please call the office and tell them to try to fit you in next week with me.   Marlen       ----- Message -----     From: Roxane Toth     Sent: 4/4/2019  7:07 AM PDT       To: Marlen Gooden P.A.-C.  Subject: Test Result Question    Marlen, my next appt with you is not until 5/22/19 and I cannot wait until then to discuss my results of my thyroid levels. I am dying from not sleeping at night time, amongst many other things. I only spotted for my period on 3/27/19 and I haven't had a period since February 25th. Again all pregnancy tests have come back negative. I am drinking plenty of water and fluids as well.  Are there plans to change my dosage or what can I do to get some relief? Please help. Thank you.

## 2019-04-10 ENCOUNTER — OFFICE VISIT (OUTPATIENT)
Dept: ENDOCRINOLOGY | Facility: MEDICAL CENTER | Age: 34
End: 2019-04-10
Payer: COMMERCIAL

## 2019-04-10 VITALS
BODY MASS INDEX: 20.99 KG/M2 | SYSTOLIC BLOOD PRESSURE: 110 MMHG | OXYGEN SATURATION: 98 % | HEIGHT: 65 IN | DIASTOLIC BLOOD PRESSURE: 64 MMHG | HEART RATE: 101 BPM | WEIGHT: 126 LBS

## 2019-04-10 DIAGNOSIS — E03.9 HYPOTHYROIDISM, UNSPECIFIED TYPE: ICD-10-CM

## 2019-04-10 DIAGNOSIS — Z80.8 FAMILY HISTORY OF THYROID CANCER: ICD-10-CM

## 2019-04-10 PROCEDURE — 99213 OFFICE O/P EST LOW 20 MIN: CPT | Performed by: PHYSICIAN ASSISTANT

## 2019-04-11 NOTE — PROGRESS NOTES
Endocrinology Clinic Progress Note  PCP: JOSEPH Goodman    HPI:  Roxane Toth is a 34 y.o. old patient who comes in today for review of endocrine problems.    Since last evaluation she was started on iron supplement by PCP.     1. Hypothyroidism, unspecified type  Patient does not sleep at night. Having difficulty falling asleep. Now with change in menses with intermittent spotting. Patient is waking tired.     She complains of symptoms associated with shaking + jittery + hot flashes + forgetful (more recently) + noticed increased emotion from her last vist.      Synthroid 100 mcg 6 days a week half tablet day #7  Reports compliance     No OCP       2. Family history of thyroid cancer-     3. Vitamin D deficiency:   50520 IU weekly   Reports compliance   Denies side effects     Patient does think she feels better after starting Vitamin D supplement     Endocrine history to date:   1. Hypothyroidism, unspecified type  Synthroid 100mcg daily     Previously on 112 mcg decreased     Labs:   4/2/19- Synthroid 100 mcg 6 days a week half tablet day #7  1/19/2019- Synthroid 100 6 days a week 1/2 tablet day #7   11/28/2019- 0.410, Ft4 1.02- 100mcg daily   9/8/2019- 9/2018- 0.20, FT4 1.10 - 112 mcg 1/2 on sunday     2. Family history of thyroid cancer  familiy history: Mother      Thyroid US:   6/2017-    1.  Thyroid gland again appears heterogeneous with appearance that is unchanged compared to the prior exam.  2.  No mass or nodule has developed since the prior exam.       ROS:  Constitutional: No  fever  HEENT: No difficulty with swallowing, change in voice, or swelling in throat area   Cardiac: see above   Resp: No shortness of breath  GI: No abdominal pain, nausea, vomiting, or diarrhea   Neuro: No numbness or tinging in feet  Endo: no polyuria or polydipsia  All other detailed ROS is otherwise negative       Past Medical History:  Patient Active Problem List    Diagnosis Date Noted   • Tobacco abuse  "01/16/2019   • Hot flashes 05/02/2017   • Family history of thyroid cancer 05/21/2015   • Hypothyroidism 03/30/2015   • Migraine 03/30/2015       Family Medical History:   Family History   Problem Relation Age of Onset   • Cancer Mother         thyroid ca   • Other Father         healthy   • Stroke Unknown    • Diabetes Unknown    • Heart Disease Unknown    • Hypertension Unknown        Social History:   Social History     Social History   • Marital status:      Spouse name: N/A   • Number of children: N/A   • Years of education: N/A     Occupational History   • Not on file.     Social History Main Topics   • Smoking status: Current Every Day Smoker     Packs/day: 1.00     Years: 17.00     Types: Cigarettes   • Smokeless tobacco: Never Used      Comment: one pack every other day   • Alcohol use 0.5 oz/week     1 Glasses of wine per week      Comment: twice a year   • Drug use: Yes      Comment: marijuana on occasion   • Sexual activity: Yes     Partners: Male     Birth control/ protection: Pill     Other Topics Concern   • Not on file     Social History Narrative   • No narrative on file         Medications:    Current Outpatient Prescriptions:   •  Prenatal Multivit-Min-Fe-FA (PRENATAL/IRON PO), Take  by mouth., Disp: , Rfl:   •  vitamin D, Ergocalciferol, (DRISDOL) 18738 units Cap capsule, Take 1 Cap by mouth every 7 days., Disp: 4 Cap, Rfl: 3  •  pantoprazole (PROTONIX) 40 MG Tablet Delayed Response, Take 1 Tab by mouth every day., Disp: 30 Tab, Rfl: 0  •  SYNTHROID 100 MCG Tab, Take 1 Tab by mouth Every morning on an empty stomach., Disp: 90 Tab, Rfl: 1  •  Ferrous Sulfate (IRON) 325 (65 Fe) MG Tab, Take 1 Each by mouth every day. (Patient not taking: Reported on 4/10/2019), Disp: 30 Tab, Rfl: 11    Labs: Reviewed as above     Physical Examination:  Vital signs: /64 (BP Location: Right arm, Patient Position: Sitting, BP Cuff Size: Adult)   Pulse (!) 101   Ht 1.651 m (5' 5\")   Wt 57.2 kg (126 lb)  "  SpO2 98%   BMI 20.97 kg/m²  Body mass index is 20.97 kg/m².  General: No apparent distress, cooperative  Eyes: No scleral icterus, no discharge, normal eyelids  Neck: No abnormal masses on inspection, normal thyroid exam  Resp: Normal effort, clear to auscultation bilaterally  CVS: Regular rate and rhythm, S1 S2 normal, no murmur  Extremities: No lower extremity edema  Abdomen: abdominal obesity present  Skin: No rash on visible skin  Psych: Alert and oriented, normal mood and affect, intact memory and able to make informed decisions.    Assessment and Plan:  1. Hypothyroidism, unspecified type  Reviewed labs over the coarse of the last several months. Secondary to symptoms will increase 1/2 tablet to 75 mcg     Synthroid 100 mcg -6 days   Synthroid 75 mcg -1 day a week (INCREASED)     - FREE THYROXINE; Future  - T3 FREE; Future  - TSH; Future  - TRIIDOTHYRONINE; Future    2. Family history of thyroid cancer  Will monitor with surveillance     Return in about 2 months (around 6/10/2019).    Thank you for allowing me to participate in the care of this patient.  If you have any questions or concerns please do not hesitate to contact me.    Marlen Gooden P.A.-C.    CC:   DONNY Goodman.P.SYED.    This note was created using voice recognition software (Dragon). The accuracy of the dictation is limited by the abilities of the software. I have reviewed the note prior to signing, however some errors in grammar and context are still possible. If you have any questions related to this note please do not hesitate to contact our office.

## 2019-05-03 ENCOUNTER — HOSPITAL ENCOUNTER (OUTPATIENT)
Dept: LAB | Facility: MEDICAL CENTER | Age: 34
End: 2019-05-03
Attending: NURSE PRACTITIONER
Payer: COMMERCIAL

## 2019-05-03 LAB
ESTRADIOL SERPL-MCNC: 28 PG/ML
FERRITIN SERPL-MCNC: 12.7 NG/ML (ref 10–291)
PROLACTIN SERPL-MCNC: 10.63 NG/ML (ref 2.8–26)
TESTOST SERPL-MCNC: 37 NG/DL (ref 9–75)

## 2019-05-03 PROCEDURE — 82728 ASSAY OF FERRITIN: CPT

## 2019-05-03 PROCEDURE — 82670 ASSAY OF TOTAL ESTRADIOL: CPT

## 2019-05-03 PROCEDURE — 84144 ASSAY OF PROGESTERONE: CPT

## 2019-05-03 PROCEDURE — 84146 ASSAY OF PROLACTIN: CPT

## 2019-05-03 PROCEDURE — 36415 COLL VENOUS BLD VENIPUNCTURE: CPT

## 2019-05-03 PROCEDURE — 84403 ASSAY OF TOTAL TESTOSTERONE: CPT

## 2019-05-06 DIAGNOSIS — E03.9 ACQUIRED HYPOTHYROIDISM: ICD-10-CM

## 2019-05-06 RX ORDER — LEVOTHYROXINE SODIUM 100 MCG
100 TABLET ORAL
Qty: 90 TAB | Refills: 1 | Status: SHIPPED | OUTPATIENT
Start: 2019-05-06 | End: 2019-10-16 | Stop reason: SDUPTHER

## 2019-05-06 NOTE — TELEPHONE ENCOUNTER
Was the patient seen in the last year in this department? Yes  **LOV 4/10/19**    Does patient have an active prescription for medications requested? Yes    Received Request Via: Pharmacy

## 2019-05-08 LAB — TEST NAME 95000: NORMAL

## 2019-05-17 ENCOUNTER — HOSPITAL ENCOUNTER (OUTPATIENT)
Dept: LAB | Facility: MEDICAL CENTER | Age: 34
End: 2019-05-17
Attending: PHYSICIAN ASSISTANT
Payer: COMMERCIAL

## 2019-05-17 ENCOUNTER — HOSPITAL ENCOUNTER (OUTPATIENT)
Dept: LAB | Facility: MEDICAL CENTER | Age: 34
End: 2019-05-17
Attending: NURSE PRACTITIONER
Payer: COMMERCIAL

## 2019-05-17 DIAGNOSIS — E55.9 VITAMIN D DEFICIENCY: ICD-10-CM

## 2019-05-17 DIAGNOSIS — E03.9 HYPOTHYROIDISM, UNSPECIFIED TYPE: ICD-10-CM

## 2019-05-17 DIAGNOSIS — D50.9 IRON DEFICIENCY ANEMIA, UNSPECIFIED IRON DEFICIENCY ANEMIA TYPE: ICD-10-CM

## 2019-05-17 LAB
25(OH)D3 SERPL-MCNC: 53 NG/ML (ref 30–100)
BASOPHILS # BLD AUTO: 0.4 % (ref 0–1.8)
BASOPHILS # BLD: 0.03 K/UL (ref 0–0.12)
EOSINOPHIL # BLD AUTO: 0.07 K/UL (ref 0–0.51)
EOSINOPHIL NFR BLD: 1 % (ref 0–6.9)
ERYTHROCYTE [DISTWIDTH] IN BLOOD BY AUTOMATED COUNT: 49.2 FL (ref 35.9–50)
FERRITIN SERPL-MCNC: 12.6 NG/ML (ref 10–291)
HCT VFR BLD AUTO: 41.3 % (ref 37–47)
HGB BLD-MCNC: 14.1 G/DL (ref 12–16)
IMM GRANULOCYTES # BLD AUTO: 0.02 K/UL (ref 0–0.11)
IMM GRANULOCYTES NFR BLD AUTO: 0.3 % (ref 0–0.9)
IRON SATN MFR SERPL: 32 % (ref 15–55)
IRON SERPL-MCNC: 106 UG/DL (ref 40–170)
LYMPHOCYTES # BLD AUTO: 3.11 K/UL (ref 1–4.8)
LYMPHOCYTES NFR BLD: 43.6 % (ref 22–41)
MCH RBC QN AUTO: 31.3 PG (ref 27–33)
MCHC RBC AUTO-ENTMCNC: 34.1 G/DL (ref 33.6–35)
MCV RBC AUTO: 91.8 FL (ref 81.4–97.8)
MONOCYTES # BLD AUTO: 0.54 K/UL (ref 0–0.85)
MONOCYTES NFR BLD AUTO: 7.6 % (ref 0–13.4)
NEUTROPHILS # BLD AUTO: 3.37 K/UL (ref 2–7.15)
NEUTROPHILS NFR BLD: 47.1 % (ref 44–72)
NRBC # BLD AUTO: 0 K/UL
NRBC BLD-RTO: 0 /100 WBC
PLATELET # BLD AUTO: 186 K/UL (ref 164–446)
PMV BLD AUTO: 12.2 FL (ref 9–12.9)
RBC # BLD AUTO: 4.5 M/UL (ref 4.2–5.4)
T3 SERPL-MCNC: 87.6 NG/DL (ref 60–181)
T3FREE SERPL-MCNC: 3.56 PG/ML (ref 2.4–4.2)
T4 FREE SERPL-MCNC: 1.1 NG/DL (ref 0.53–1.43)
TIBC SERPL-MCNC: 328 UG/DL (ref 250–450)
TSH SERPL DL<=0.005 MIU/L-ACNC: 1.85 UIU/ML (ref 0.38–5.33)
WBC # BLD AUTO: 7.1 K/UL (ref 4.8–10.8)

## 2019-05-17 PROCEDURE — 82728 ASSAY OF FERRITIN: CPT

## 2019-05-17 PROCEDURE — 85025 COMPLETE CBC W/AUTO DIFF WBC: CPT

## 2019-05-17 PROCEDURE — 83540 ASSAY OF IRON: CPT

## 2019-05-17 PROCEDURE — 83550 IRON BINDING TEST: CPT

## 2019-05-17 PROCEDURE — 84481 FREE ASSAY (FT-3): CPT

## 2019-05-17 PROCEDURE — 84443 ASSAY THYROID STIM HORMONE: CPT

## 2019-05-17 PROCEDURE — 82306 VITAMIN D 25 HYDROXY: CPT

## 2019-05-17 PROCEDURE — 36415 COLL VENOUS BLD VENIPUNCTURE: CPT

## 2019-05-17 PROCEDURE — 84480 ASSAY TRIIODOTHYRONINE (T3): CPT

## 2019-05-17 PROCEDURE — 84439 ASSAY OF FREE THYROXINE: CPT

## 2019-05-22 ENCOUNTER — OFFICE VISIT (OUTPATIENT)
Dept: ENDOCRINOLOGY | Facility: MEDICAL CENTER | Age: 34
End: 2019-05-22
Payer: COMMERCIAL

## 2019-05-22 VITALS
WEIGHT: 126 LBS | BODY MASS INDEX: 20.25 KG/M2 | DIASTOLIC BLOOD PRESSURE: 60 MMHG | HEIGHT: 66 IN | SYSTOLIC BLOOD PRESSURE: 100 MMHG | HEART RATE: 73 BPM | RESPIRATION RATE: 16 BRPM

## 2019-05-22 DIAGNOSIS — Z80.8 FAMILY HISTORY OF THYROID CANCER: ICD-10-CM

## 2019-05-22 DIAGNOSIS — E03.9 HYPOTHYROIDISM, UNSPECIFIED TYPE: ICD-10-CM

## 2019-05-22 PROCEDURE — 99213 OFFICE O/P EST LOW 20 MIN: CPT | Performed by: PHYSICIAN ASSISTANT

## 2019-05-22 NOTE — PROGRESS NOTES
Endocrinology Clinic Progress Note  PCP: JOSEPH Goodman    HPI:  Roxane Toth is a 34 y.o. old patient who comes in today for review of endocrine problems.    1. Hypothyroidism, unspecified type  She is feeling better. She states symptoms are improved. She is still having a lot of mood swings.   Patient saw GYN and had 2 periods last month.      Synthroid 100 mcg -6 days   Synthroid 75 mcg -1 day a week (INCREASED at last evaluation       Reports compliance   Patient is still looking to get pregnant in the near future.      No OCP     5/17/2019- TSH 1.850, Free T4 1.10, T3 87.6, FT3 3.56 - Synthroid 100 mcg -6 days   Synthroid 75 mcg -1 day a week (INCREASED at last evaluation            2. Family history of thyroid cancer-      3. Vitamin D deficiency:   86588 IU weekly   Reports compliance   Denies side effects      Patient does think she feels better after starting Vitamin D supplement     5/17/2019- Vitamin D 53 - will d/c 50k and start Vitamin D 2000 IU daily      Endocrine history to date:   1. Hypothyroidism, unspecified type  Synthroid 100mcg daily      Previously on 112 mcg decreased      Labs:     5/17/2019- TSH 1.850, Free T4 1.10, T3 87.6, FT3 3.56 - Synthroid 100 mcg -6 days   Synthroid 75 mcg -1 day a week (INCREASED at last evaluation)     4/2/19- Synthroid 100 mcg 6 days a week half tablet day #7  1/19/2019- Synthroid 100 6 days a week 1/2 tablet day #7   11/28/2019- 0.410, Ft4 1.02- 100mcg daily   9/8/2019- 9/2018- 0.20, FT4 1.10 - 112 mcg 1/2 on sunday     2. Family history of thyroid cancer  familiy history: Mother      Thyroid US:   6/2017-    1.  Thyroid gland again appears heterogeneous with appearance that is unchanged compared to the prior exam.  2.  No mass or nodule has developed since the prior exam.     ROS:  Constitutional: No weight loss or gain,  fever  HEENT: No difficulty with swallowing, change in voice, or swelling in throat area   Cardiac: No chest pain,  palpitations, or racing heart  Resp: No shortness of breath  GI: No abdominal pain, nausea, vomiting, or diarrhea   Neuro: No numbness or tinging in feet  Endo: No heat or cold intolerance, no polyuria or polydipsia  All other detailed ROS is otherwise negative       Past Medical History:  Patient Active Problem List    Diagnosis Date Noted   • Tobacco abuse 01/16/2019   • Hot flashes 05/02/2017   • Family history of thyroid cancer 05/21/2015   • Hypothyroidism 03/30/2015   • Migraine 03/30/2015       Family Medical History:   Family History   Problem Relation Age of Onset   • Cancer Mother         thyroid ca   • Other Father         healthy   • Stroke Unknown    • Diabetes Unknown    • Heart Disease Unknown    • Hypertension Unknown        Social History:   Social History     Social History   • Marital status:      Spouse name: N/A   • Number of children: N/A   • Years of education: N/A     Occupational History   • Not on file.     Social History Main Topics   • Smoking status: Current Every Day Smoker     Packs/day: 1.00     Years: 17.00     Types: Cigarettes   • Smokeless tobacco: Never Used      Comment: one pack every other day   • Alcohol use 0.5 oz/week     1 Glasses of wine per week      Comment: twice a year   • Drug use: Yes      Comment: marijuana on occasion   • Sexual activity: Yes     Partners: Male     Birth control/ protection: Pill     Other Topics Concern   • Not on file     Social History Narrative   • No narrative on file         Medications:    Current Outpatient Prescriptions:   •  SYNTHROID 100 MCG Tab, Take 1 Tab by mouth Every morning on an empty stomach., Disp: 90 Tab, Rfl: 1  •  Prenatal Multivit-Min-Fe-FA (PRENATAL/IRON PO), Take  by mouth., Disp: , Rfl:   •  vitamin D, Ergocalciferol, (DRISDOL) 52874 units Cap capsule, Take 1 Cap by mouth every 7 days., Disp: 4 Cap, Rfl: 3  •  pantoprazole (PROTONIX) 40 MG Tablet Delayed Response, Take 1 Tab by mouth every day., Disp: 30 Tab, Rfl:  "0    Labs: Reviewed as above     Physical Examination:  Vital signs: /60 (BP Location: Left arm, Patient Position: Sitting, BP Cuff Size: Adult)   Pulse 73   Resp 16   Ht 1.664 m (5' 5.5\")   Wt 57.2 kg (126 lb)   BMI 20.65 kg/m²  Body mass index is 20.65 kg/m².  General: No apparent distress, cooperative  Eyes: No scleral icterus, no discharge, normal eyelids  Neck: No abnormal masses on inspection, normal thyroid exam  Resp: Normal effort, clear to auscultation bilaterally  CVS: Regular rate and rhythm, S1 S2 normal, no murmur  Extremities: No lower extremity edema  Abdomen: abdominal obesity present  Musculoskeletal: Normal digits and nails  Skin: No rash on visible skin  Psych: Alert and oriented, normal mood and affect, intact memory and able to make informed decisions.    Assessment and Plan:  1. Hypothyroidism, unspecified type  Increase-   Synthroid 100 mcg Daily 88 mcg once weekly   On prenatal vitamin -   Discussed hypothyroidism and pregnancy will target TSH <1 - Call if + pregnancy     2. Vitamin D deficiency:   5/17/2019- Vitamin D 53 - will d/c 50k and start Vitamin D 2000 IU daily     Return in about 2 months (around 7/22/2019).    Thank you for allowing me to participate in the care of this patient.  If you have any questions or concerns please do not hesitate to contact me.    Marlen Gooden P.A.-C.    CC:   JOSEPH Goodman    This note was created using voice recognition software (Dragon). The accuracy of the dictation is limited by the abilities of the software. I have reviewed the note prior to signing, however some errors in grammar and context are still possible. If you have any questions related to this note please do not hesitate to contact our office.   "

## 2019-06-11 NOTE — TELEPHONE ENCOUNTER
Was the patient seen in the last year in this department? Yes    Does patient have an active prescription for medications requested? Yes    Received Request Via: Patient     **Last office visit 5/22/19**

## 2019-06-17 RX ORDER — ERGOCALCIFEROL 1.25 MG/1
50000 CAPSULE ORAL
Qty: 4 CAP | Refills: 3 | Status: SHIPPED | OUTPATIENT
Start: 2019-06-17 | End: 2019-12-16 | Stop reason: SDUPTHER

## 2019-06-17 RX ORDER — PANTOPRAZOLE SODIUM 40 MG/1
40 TABLET, DELAYED RELEASE ORAL DAILY
Qty: 30 TAB | Refills: 0 | Status: SHIPPED | OUTPATIENT
Start: 2019-06-17 | End: 2019-10-31 | Stop reason: SDUPTHER

## 2019-07-22 ENCOUNTER — HOSPITAL ENCOUNTER (OUTPATIENT)
Dept: LAB | Facility: MEDICAL CENTER | Age: 34
End: 2019-07-22
Attending: PHYSICIAN ASSISTANT
Payer: COMMERCIAL

## 2019-07-22 DIAGNOSIS — E03.9 HYPOTHYROIDISM, UNSPECIFIED TYPE: ICD-10-CM

## 2019-07-22 LAB
T3 SERPL-MCNC: 89.2 NG/DL (ref 60–181)
T3FREE SERPL-MCNC: 3.43 PG/ML (ref 2.4–4.2)
T4 FREE SERPL-MCNC: 1.06 NG/DL (ref 0.53–1.43)
TSH SERPL DL<=0.005 MIU/L-ACNC: 2.26 UIU/ML (ref 0.38–5.33)

## 2019-07-22 PROCEDURE — 36415 COLL VENOUS BLD VENIPUNCTURE: CPT

## 2019-07-22 PROCEDURE — 84481 FREE ASSAY (FT-3): CPT

## 2019-07-22 PROCEDURE — 84480 ASSAY TRIIODOTHYRONINE (T3): CPT

## 2019-07-22 PROCEDURE — 84443 ASSAY THYROID STIM HORMONE: CPT

## 2019-07-22 PROCEDURE — 84439 ASSAY OF FREE THYROXINE: CPT

## 2019-07-24 ENCOUNTER — OFFICE VISIT (OUTPATIENT)
Dept: ENDOCRINOLOGY | Facility: MEDICAL CENTER | Age: 34
End: 2019-07-24
Payer: COMMERCIAL

## 2019-07-24 VITALS
HEART RATE: 97 BPM | HEIGHT: 66 IN | BODY MASS INDEX: 19.93 KG/M2 | OXYGEN SATURATION: 99 % | DIASTOLIC BLOOD PRESSURE: 60 MMHG | SYSTOLIC BLOOD PRESSURE: 100 MMHG | WEIGHT: 124 LBS

## 2019-07-24 DIAGNOSIS — E03.9 HYPOTHYROIDISM, UNSPECIFIED TYPE: ICD-10-CM

## 2019-07-24 PROCEDURE — 99213 OFFICE O/P EST LOW 20 MIN: CPT | Performed by: PHYSICIAN ASSISTANT

## 2019-07-24 NOTE — PATIENT INSTRUCTIONS
Increase-   Synthroid 100 mcg Daily   Monitor for symptoms   On prenatal vitamin -   Discussed hypothyroidism and pregnancy will target TSH <1 - Call if + pregnancy     If pregnancy - increase by 1/2 tablet 2 times a week

## 2019-07-24 NOTE — PROGRESS NOTES
Endocrinology Clinic Progress Note  PCP: JOSEPH Goodman    HPI:  Roxane Toth is a 34 y.o. old patient who comes in today for review of endocrine problems.      1. Hypothyroidism, unspecified type  She is feeling good. She continues with insomnia and hairloss. Patient has not noticed a change since increasing to the 88 mcg daily. Patient has difficulty falling asleep.     + still with mood swings.   Patient menses are consistent and occurring every 30 days      Synthroid 100 mcg -6 days   Synthroid 88 mcg -1 day a week      Reports compliance   Patient is still looking to get pregnant in the near future.      No OCP   7/22/2019- TSH 2.260, FT41.06, Ft3 -T4 100 mcg +88 once weekly  5/17/2019- TSH 1.850, Free T4 1.10, T3 87.6, FT3 3.56 - Synthroid 100 mcg -6 days   Synthroid 75 mcg -1 day a week (INCREASED at last evaluation         2. Family history of thyroid cancer-   No new family history      3. Vitamin D deficiency:   07361 IU weekly   Reports compliance   Denies side effects      5/17/2019- Vitamin D 53 - will d/c 50k and start Vitamin D 2000 IU daily      Endocrine history to date:   1. Hypothyroidism, unspecified type  Synthroid 100mcg daily      Previously on 112 mcg decreased      Labs:   7/22/2019- TSH 2.260, FT41.06, Ft3 -T4 100 mcg +88 once weekly  5/17/2019- TSH 1.850, Free T4 1.10, T3 87.6, FT3 3.56 - Synthroid 100 mcg -6 days   Synthroid 75 mcg -1 day a week (INCREASED at last evaluation)     4/2/19- Synthroid 100 mcg 6 days a week half tablet day #7  1/19/2019- Synthroid 100 6 days a week 1/2 tablet day #7   11/28/2019- 0.410, Ft4 1.02- 100mcg daily   9/8/2019- 9/2018- 0.20, FT4 1.10 - 112 mcg 1/2 on sunday     2. Family history of thyroid cancer  familiy history: Mother      Thyroid US:   6/2017-    1.  Thyroid gland again appears heterogeneous with appearance that is unchanged compared to the prior exam.  2.  No mass or nodule has developed since the prior  exam.     ROS:  Constitutional: No weight loss or gain,  fever  HEENT: No difficulty with swallowing, change in voice, or swelling in throat area   Cardiac: No chest pain, palpitations, or racing heart  Resp: No shortness of breath  GI: No abdominal pain, nausea, vomiting, or diarrhea   Neuro: No numbness or tinging in feet  Endo: No heat or cold intolerance, no polyuria or polydipsia  All other detailed ROS is otherwise negative       Past Medical History:  Patient Active Problem List    Diagnosis Date Noted   • Tobacco abuse 01/16/2019   • Hot flashes 05/02/2017   • Family history of thyroid cancer 05/21/2015   • Hypothyroidism 03/30/2015   • Migraine 03/30/2015       Family Medical History:   Family History   Problem Relation Age of Onset   • Cancer Mother         thyroid ca   • Other Father         healthy   • Stroke Unknown    • Diabetes Unknown    • Heart Disease Unknown    • Hypertension Unknown        Social History:   Social History     Social History   • Marital status:      Spouse name: N/A   • Number of children: N/A   • Years of education: N/A     Occupational History   • Not on file.     Social History Main Topics   • Smoking status: Current Every Day Smoker     Packs/day: 1.00     Years: 17.00     Types: Cigarettes   • Smokeless tobacco: Never Used      Comment: one pack every other day   • Alcohol use 0.5 oz/week     1 Glasses of wine per week      Comment: twice a year   • Drug use: Yes      Comment: marijuana on occasion   • Sexual activity: Yes     Partners: Male     Birth control/ protection: Pill     Other Topics Concern   • Not on file     Social History Narrative   • No narrative on file         Medications:    Current Outpatient Prescriptions:   •  pantoprazole (PROTONIX) 40 MG Tablet Delayed Response, Take 1 Tab by mouth every day., Disp: 30 Tab, Rfl: 0  •  vitamin D, Ergocalciferol, (DRISDOL) 65099 units Cap capsule, Take 1 Cap by mouth every 7 days., Disp: 4 Cap, Rfl: 3  •   "SYNTHROID 100 MCG Tab, Take 1 Tab by mouth Every morning on an empty stomach., Disp: 90 Tab, Rfl: 1  •  Prenatal Multivit-Min-Fe-FA (PRENATAL/IRON PO), Take  by mouth., Disp: , Rfl:     Labs: Reviewed as above     Physical Examination:  Vital signs: /60 (BP Location: Left arm, Patient Position: Sitting, BP Cuff Size: Adult)   Pulse 97   Ht 1.664 m (5' 5.5\")   Wt 56.2 kg (124 lb)   SpO2 99%   BMI 20.32 kg/m²  Body mass index is 20.32 kg/m².  General: No apparent distress, cooperative  Eyes: No scleral icterus, no discharge, normal eyelids  Neck: No abnormal masses on inspection, normal thyroid exam  Resp: Normal effort, clear to auscultation bilaterally  CVS: Regular rate and rhythm, S1 S2 normal, no murmur  Musculoskeletal: Normal digits and nails  Skin: tattoos   Psych: Alert and oriented, normal mood and affect, intact memory and able to make informed decisions.    Assessment and Plan:  1. Hypothyroidism, unspecified type  Increase-   Synthroid 100 mcg Daily   Previously when on 100 mcg noted symptoms of increase mood swings and feeling \"ichy\" will try. Discussed level TSH <1 recommended for preporation re: pregnancy  On prenatal vitamin -   Discussed hypothyroidism and pregnancy will target TSH <1 - Call if + pregnancy     2. Vitamin D deficiency:   5/17/2019- Vitamin D 53 - will d/c 50k and start Vitamin D 2000 IU daily     Return in about 3 months (around 10/24/2019).    Thank you for allowing me to participate in the care of this patient.  If you have any questions or concerns please do not hesitate to contact me.    Marlen Gooden P.A.-C.    CC:   MARCY Goodman.    This note was created using voice recognition software (Dragon). The accuracy of the dictation is limited by the abilities of the software. I have reviewed the note prior to signing, however some errors in grammar and context are still possible. If you have any questions related to this note please do not hesitate to contact " our office.

## 2019-08-28 ENCOUNTER — HOSPITAL ENCOUNTER (OUTPATIENT)
Dept: LAB | Facility: MEDICAL CENTER | Age: 34
End: 2019-08-28
Payer: COMMERCIAL

## 2019-08-28 LAB
BDY FAT % MEASURED: 21.8 %
BP DIAS: 52 MMHG
BP SYS: 105 MMHG
CHOLEST SERPL-MCNC: 147 MG/DL (ref 100–199)
DIABETES HTDIA: NO
EVENT NAME HTEVT: NORMAL
FASTING HTFAS: YES
GLUCOSE SERPL-MCNC: 90 MG/DL (ref 65–99)
HDLC SERPL-MCNC: 60 MG/DL
HYPERTENSION HTHYP: NO
LDLC SERPL CALC-MCNC: 78 MG/DL
SCREENING LOC CITY HTCIT: NORMAL
SCREENING LOC STATE HTSTA: NORMAL
SCREENING LOCATION HTLOC: NORMAL
SMOKING HTSMO: YES
SUBSCRIBER ID HTSID: NORMAL
TRIGL SERPL-MCNC: 47 MG/DL (ref 0–149)

## 2019-08-28 PROCEDURE — 80061 LIPID PANEL: CPT

## 2019-08-28 PROCEDURE — 36415 COLL VENOUS BLD VENIPUNCTURE: CPT

## 2019-08-28 PROCEDURE — 82947 ASSAY GLUCOSE BLOOD QUANT: CPT

## 2019-08-28 PROCEDURE — S5190 WELLNESS ASSESSMENT BY NONPH: HCPCS

## 2019-09-20 ENCOUNTER — IMMUNIZATION (OUTPATIENT)
Dept: OCCUPATIONAL MEDICINE | Facility: CLINIC | Age: 34
End: 2019-09-20

## 2019-09-20 DIAGNOSIS — Z23 NEED FOR VACCINATION: ICD-10-CM

## 2019-09-20 PROCEDURE — 90686 IIV4 VACC NO PRSV 0.5 ML IM: CPT | Performed by: PREVENTIVE MEDICINE

## 2019-10-16 DIAGNOSIS — E03.9 ACQUIRED HYPOTHYROIDISM: ICD-10-CM

## 2019-10-16 NOTE — TELEPHONE ENCOUNTER
Was the patient seen in the last year in this department? Yes    Does patient have an active prescription for medications requested? No     Received Request Via: Pharmacy     Last seen 07/24/2019

## 2019-10-18 RX ORDER — LEVOTHYROXINE SODIUM 100 MCG
TABLET ORAL
Qty: 30 TAB | Refills: 4 | Status: SHIPPED | OUTPATIENT
Start: 2019-10-18 | End: 2019-11-22

## 2019-10-28 ENCOUNTER — HOSPITAL ENCOUNTER (OUTPATIENT)
Dept: LAB | Facility: MEDICAL CENTER | Age: 34
End: 2019-10-28
Attending: PHYSICIAN ASSISTANT
Payer: COMMERCIAL

## 2019-10-28 DIAGNOSIS — E03.9 HYPOTHYROIDISM, UNSPECIFIED TYPE: ICD-10-CM

## 2019-10-28 LAB
T3 SERPL-MCNC: 97.5 NG/DL (ref 60–181)
T3FREE SERPL-MCNC: 3.45 PG/ML (ref 2.4–4.2)
T4 FREE SERPL-MCNC: 0.93 NG/DL (ref 0.53–1.43)
TSH SERPL DL<=0.005 MIU/L-ACNC: 8.46 UIU/ML (ref 0.38–5.33)

## 2019-10-28 PROCEDURE — 84443 ASSAY THYROID STIM HORMONE: CPT

## 2019-10-28 PROCEDURE — 84439 ASSAY OF FREE THYROXINE: CPT

## 2019-10-28 PROCEDURE — 36415 COLL VENOUS BLD VENIPUNCTURE: CPT

## 2019-10-28 PROCEDURE — 84481 FREE ASSAY (FT-3): CPT

## 2019-10-28 PROCEDURE — 84480 ASSAY TRIIODOTHYRONINE (T3): CPT

## 2019-10-30 ENCOUNTER — OFFICE VISIT (OUTPATIENT)
Dept: ENDOCRINOLOGY | Facility: MEDICAL CENTER | Age: 34
End: 2019-10-30
Payer: COMMERCIAL

## 2019-10-30 VITALS
BODY MASS INDEX: 21.38 KG/M2 | OXYGEN SATURATION: 98 % | HEIGHT: 66 IN | HEART RATE: 81 BPM | DIASTOLIC BLOOD PRESSURE: 72 MMHG | WEIGHT: 133 LBS | SYSTOLIC BLOOD PRESSURE: 116 MMHG

## 2019-10-30 DIAGNOSIS — E03.9 HYPOTHYROIDISM, UNSPECIFIED TYPE: ICD-10-CM

## 2019-10-30 DIAGNOSIS — E55.9 VITAMIN D DEFICIENCY: ICD-10-CM

## 2019-10-30 DIAGNOSIS — Z80.8 FAMILY HISTORY OF THYROID CANCER: ICD-10-CM

## 2019-10-30 PROCEDURE — 99214 OFFICE O/P EST MOD 30 MIN: CPT | Performed by: PHYSICIAN ASSISTANT

## 2019-10-30 NOTE — PROGRESS NOTES
Endocrinology Clinic Progress Note  PCP: JOSEPH Goodman    HPI:  Roxane Toth is a 34 y.o. old patient who comes in today for review of endocrine problems.      1. Hypothyroidism, unspecified type  Synthroid 100 mcg   Patient is taking on empty stomach.   Patient c/o fatigue and weight gain over the last several weeks.     Reports compliance   Denies any changes in diet/exercise or herbal remedies   Patient is still looking to get pregnant in the near future.      No OCP      Ref. Range 10/28/2019 17:49   TSH Latest Ref Range: 0.380 - 5.330 uIU/mL 8.460 (H)   Free T-4 Latest Ref Range: 0.53 - 1.43 ng/dL 0.93   T3 Latest Ref Range: 60.0 - 181.0 ng/dL 97.5   T3,Free Latest Ref Range: 2.40 - 4.20 pg/mL 3.45     7/22/2019- TSH 2.260, FT41.06, Ft3 -T4 100 mcg +88 once weekly  5/17/2019- TSH 1.850, Free T4 1.10, T3 87.6, FT3 3.56 - Synthroid 100 mcg -6 days   Synthroid 75 mcg -1 day a week (INCREASED at last evaluation        2. Family history of thyroid cancer-   No new family history      3. Vitamin D deficiency:   46308 IU weekly   Reports compliance   Denies side effects      5/17/2019- Vitamin D 53 - will d/c 50k and start Vitamin D 2000 IU daily      Endocrine history to date:   1. Hypothyroidism, unspecified type  Labs:   7/22/19- TSH 2.260, FT41.06, Ft3 -T4 100 mcg +88 once weekly  5/17/19- TSH 1.850, Free T4 1.10, T3 87.6, FT3 3.56 - Synthroid 100 mcg -6 days   Synthroid 75 mcg -1 day a week (INCREASED at last evaluation)   4/2/19- Synthroid 100 mcg 6 days a week half tablet day #7  1/19/19- Synthroid 100 6 days a week 1/2 tablet day #7   11/28/19- 0.410, Ft4 1.02- 100mcg daily   9/8/19- 9/2018- 0.20, FT4 1.10 - 112 mcg 1/2 on sunday     2. Family history of thyroid cancer  familiy history: Mother      Thyroid US:   6/2017-    1.  Thyroid gland again appears heterogeneous with appearance that is unchanged compared to the prior exam.  2.  No mass or nodule has developed since the prior  exam.     ROS:  Constitutional: No weight loss or gain,  fever  HEENT: No difficulty with swallowing, change in voice, or swelling in throat area   Cardiac: No chest pain, palpitations, or racing heart  Resp: No shortness of breath  GI: No abdominal pain, nausea, vomiting, or diarrhea   Neuro: No numbness or tinging in feet  Endo: No heat or cold intolerance, no polyuria or polydipsia  All other detailed ROS is otherwise negative       Past Medical History:  Patient Active Problem List    Diagnosis Date Noted   • Tobacco abuse 01/16/2019   • Hot flashes 05/02/2017   • Family history of thyroid cancer 05/21/2015   • Hypothyroidism 03/30/2015   • Migraine 03/30/2015       Family Medical History:   Family History   Problem Relation Age of Onset   • Cancer Mother         thyroid ca   • Other Father         healthy   • Stroke Unknown    • Diabetes Unknown    • Heart Disease Unknown    • Hypertension Unknown        Social History:   Social History     Socioeconomic History   • Marital status:      Spouse name: Not on file   • Number of children: Not on file   • Years of education: Not on file   • Highest education level: Not on file   Occupational History   • Not on file   Social Needs   • Financial resource strain: Not on file   • Food insecurity:     Worry: Not on file     Inability: Not on file   • Transportation needs:     Medical: Not on file     Non-medical: Not on file   Tobacco Use   • Smoking status: Current Every Day Smoker     Packs/day: 1.00     Years: 17.00     Pack years: 17.00     Types: Cigarettes   • Smokeless tobacco: Never Used   • Tobacco comment: one pack every other day   Substance and Sexual Activity   • Alcohol use: Yes     Alcohol/week: 0.5 oz     Types: 1 Glasses of wine per week     Comment: twice a year   • Drug use: Yes     Comment: marijuana on occasion   • Sexual activity: Yes     Partners: Male     Birth control/protection: Pill   Lifestyle   • Physical activity:     Days per week:  "Not on file     Minutes per session: Not on file   • Stress: Not on file   Relationships   • Social connections:     Talks on phone: Not on file     Gets together: Not on file     Attends Buddhist service: Not on file     Active member of club or organization: Not on file     Attends meetings of clubs or organizations: Not on file     Relationship status: Not on file   • Intimate partner violence:     Fear of current or ex partner: Not on file     Emotionally abused: Not on file     Physically abused: Not on file     Forced sexual activity: Not on file   Other Topics Concern   • Not on file   Social History Narrative   • Not on file         Medications:    Current Outpatient Medications:   •  SYNTHROID 100 MCG Tab, TAKE 1 TABLET BY MOUTH IN THE MORNING ON AN EMPTY STOMACH-KARTHIKEYAN 1, Disp: 30 Tab, Rfl: 4  •  vitamin D, Ergocalciferol, (DRISDOL) 59297 units Cap capsule, Take 1 Cap by mouth every 7 days., Disp: 4 Cap, Rfl: 3  •  Prenatal Multivit-Min-Fe-FA (PRENATAL/IRON PO), Take  by mouth., Disp: , Rfl:   •  pantoprazole (PROTONIX) 40 MG Tablet Delayed Response, Take 1 Tab by mouth every day., Disp: 30 Tab, Rfl: 2    Labs: Reviewed as above     Physical Examination:  Vital signs: /72 (BP Location: Left arm, Patient Position: Sitting, BP Cuff Size: Adult)   Pulse 81   Ht 1.664 m (5' 5.5\")   Wt 60.3 kg (133 lb)   SpO2 98%   BMI 21.80 kg/m²  Body mass index is 21.8 kg/m².  General: No apparent distress, cooperative  Eyes: No scleral icterus, no discharge, normal eyelids  Neck: No abnormal masses on inspection, normal thyroid exam  Resp: Normal effort, clear to auscultation bilaterally  CVS: Regular rate and rhythm,   Skin: tattoos   Psych: Alert and oriented, normal mood and affect, intact memory and able to make informed decisions.    Assessment and Plan:  1. Hypothyroidism, unspecified type  Increase-   Synthroid 100 mcg with extra 1/2 tablet 2 x a week   Repeat labs in 6 weeks and return in 8 week     2. " Vitamin D deficiency:   Continue Vitamin D-   Will recheck Vitamin D prior to next evaluation.     Return in about 8 weeks (around 12/25/2019).    Thank you for allowing me to participate in the care of this patient.  If you have any questions or concerns please do not hesitate to contact me.    Marlen Gooden P.A.-C.    CC:   DONNY [email or web address].    This note was created using voice recognition software (Dragon). The accuracy of the dictation is limited by the abilities of the software. I have reviewed the note prior to signing, however some errors in grammar and context are still possible. If you have any questions related to this note please do not hesitate to contact our office.

## 2019-10-31 RX ORDER — PANTOPRAZOLE SODIUM 40 MG/1
40 TABLET, DELAYED RELEASE ORAL DAILY
Qty: 30 TAB | Refills: 2 | Status: SHIPPED | OUTPATIENT
Start: 2019-10-31 | End: 2020-02-18

## 2019-11-20 DIAGNOSIS — E03.9 ACQUIRED HYPOTHYROIDISM: ICD-10-CM

## 2019-11-20 NOTE — TELEPHONE ENCOUNTER
Was the patient seen in the last year in this department? Yes    Does patient have an active prescription for medications requested? No     Received Request Via: Patient     Patient finished her 100mcg of Synthroid supply. Said you were going to increase it to 112 please send in a prescription for this medication.

## 2019-11-22 RX ORDER — LEVOTHYROXINE SODIUM 112 MCG
112 TABLET ORAL
Qty: 30 TAB | Refills: 3 | Status: SHIPPED | OUTPATIENT
Start: 2019-11-22 | End: 2020-01-29 | Stop reason: SDUPTHER

## 2019-11-22 RX ORDER — LEVOTHYROXINE SODIUM 100 MCG
TABLET ORAL
Qty: 30 TAB | Refills: 4 | OUTPATIENT
Start: 2019-11-22

## 2019-12-09 ENCOUNTER — HOSPITAL ENCOUNTER (OUTPATIENT)
Dept: LAB | Facility: MEDICAL CENTER | Age: 34
End: 2019-12-09
Attending: PHYSICIAN ASSISTANT
Payer: COMMERCIAL

## 2019-12-09 DIAGNOSIS — E55.9 VITAMIN D DEFICIENCY: ICD-10-CM

## 2019-12-09 DIAGNOSIS — E03.9 HYPOTHYROIDISM, UNSPECIFIED TYPE: ICD-10-CM

## 2019-12-09 LAB
25(OH)D3 SERPL-MCNC: 48 NG/ML (ref 30–100)
T3 SERPL-MCNC: 127.2 NG/DL (ref 60–181)
T3FREE SERPL-MCNC: 3.4 PG/ML (ref 2.4–4.2)
T4 FREE SERPL-MCNC: 1.15 NG/DL (ref 0.53–1.43)
TSH SERPL DL<=0.005 MIU/L-ACNC: 1.32 UIU/ML (ref 0.38–5.33)

## 2019-12-09 PROCEDURE — 84443 ASSAY THYROID STIM HORMONE: CPT

## 2019-12-09 PROCEDURE — 82306 VITAMIN D 25 HYDROXY: CPT

## 2019-12-09 PROCEDURE — 84480 ASSAY TRIIODOTHYRONINE (T3): CPT

## 2019-12-09 PROCEDURE — 36415 COLL VENOUS BLD VENIPUNCTURE: CPT

## 2019-12-09 PROCEDURE — 84439 ASSAY OF FREE THYROXINE: CPT

## 2019-12-09 PROCEDURE — 84481 FREE ASSAY (FT-3): CPT

## 2019-12-11 ENCOUNTER — OFFICE VISIT (OUTPATIENT)
Dept: ENDOCRINOLOGY | Facility: MEDICAL CENTER | Age: 34
End: 2019-12-11
Payer: COMMERCIAL

## 2019-12-11 VITALS
WEIGHT: 127 LBS | BODY MASS INDEX: 20.41 KG/M2 | HEART RATE: 97 BPM | OXYGEN SATURATION: 97 % | HEIGHT: 66 IN | SYSTOLIC BLOOD PRESSURE: 114 MMHG | DIASTOLIC BLOOD PRESSURE: 64 MMHG

## 2019-12-11 DIAGNOSIS — E03.9 ACQUIRED HYPOTHYROIDISM: ICD-10-CM

## 2019-12-11 PROCEDURE — 99213 OFFICE O/P EST LOW 20 MIN: CPT | Performed by: PHYSICIAN ASSISTANT

## 2019-12-11 NOTE — PROGRESS NOTES
Endocrinology Clinic Progress Note  PCP: JOSEPH Goodman    HPI:  Roxane Toth is a 34 y.o. old patient who comes in today for review of endocrine problems.    She is going to start trying in the next 2 months to get pregnant.     1. Hypothyroidism, unspecified type  Synthroid 112 mcg   Patient is taking on empty stomach.   She is feeling much better     Reports compliance   Denies any changes in diet/exercise or herbal remedies   Patient is still looking to get pregnant in the near future.      No OCP      Ref. Range 12/9/2019 17:26   TSH Latest Ref Range: 0.380 - 5.330 uIU/mL 1.320   Free T-4 Latest Ref Range: 0.53 - 1.43 ng/dL 1.15   T3 Latest Ref Range: 60.0 - 181.0 ng/dL 127.2   T3,Free Latest Ref Range: 2.40 - 4.20 pg/mL 3.40     7/22/2019- TSH 2.260, FT41.06, Ft3 -T4 100 mcg +88 once weekly  5/17/2019- TSH 1.850, Free T4 1.10, T3 87.6, FT3 3.56 - Synthroid 100 mcg -6 days   Synthroid 75 mcg -1 day a week (INCREASED at last evaluation        2. Family history of thyroid cancer-   No new family history      3. Vitamin D deficiency:   13321 IU weekly   Reports compliance   Denies side effects      Ref. Range 12/9/2019 17:26   25-Hydroxy   Vitamin D 25 Latest Ref Range: 30 - 100 ng/mL 48     5/17/2019- Vitamin D 53 - will d/c 50k and start Vitamin D 2000 IU daily      Endocrine history to date:   1. Hypothyroidism, unspecified type  Labs:   7/22/19- TSH 2.260, FT41.06, Ft3 -T4 100 mcg +88 once weekly  5/17/19- TSH 1.850, Free T4 1.10, T3 87.6, FT3 3.56 - Synthroid 100 mcg -6 days   Synthroid 75 mcg -1 day a week (INCREASED at last evaluation)   4/2/19- Synthroid 100 mcg 6 days a week half tablet day #7  1/19/19- Synthroid 100 6 days a week 1/2 tablet day #7   11/28/19- 0.410, Ft4 1.02- 100mcg daily   9/8/19- 9/2018- 0.20, FT4 1.10 - 112 mcg 1/2 on sunday     2. Family history of thyroid cancer  familiy history: Mother      Thyroid US:   6/2017-    1.  Thyroid gland again appears heterogeneous  with appearance that is unchanged compared to the prior exam.  2.  No mass or nodule has developed since the prior exam.     ROS:  Constitutional: No weight loss or gain,  fever  HEENT: No difficulty with swallowing, change in voice, or swelling in throat area   Cardiac: No chest pain, palpitations, or racing heart  Resp: No shortness of breath  GI: No abdominal pain, nausea, vomiting, or diarrhea   Neuro: No numbness or tinging in feet  Endo: No heat or cold intolerance, no polyuria or polydipsia  All other detailed ROS is otherwise negative       Past Medical History:  Patient Active Problem List    Diagnosis Date Noted   • Tobacco abuse 01/16/2019   • Hot flashes 05/02/2017   • Family history of thyroid cancer 05/21/2015   • Hypothyroidism 03/30/2015   • Migraine 03/30/2015       Family Medical History:   Family History   Problem Relation Age of Onset   • Cancer Mother         thyroid ca   • Other Father         healthy   • Stroke Unknown    • Diabetes Unknown    • Heart Disease Unknown    • Hypertension Unknown        Social History:   Social History     Socioeconomic History   • Marital status:      Spouse name: Not on file   • Number of children: Not on file   • Years of education: Not on file   • Highest education level: Not on file   Occupational History   • Not on file   Social Needs   • Financial resource strain: Not on file   • Food insecurity:     Worry: Not on file     Inability: Not on file   • Transportation needs:     Medical: Not on file     Non-medical: Not on file   Tobacco Use   • Smoking status: Current Every Day Smoker     Packs/day: 1.00     Years: 17.00     Pack years: 17.00     Types: Cigarettes   • Smokeless tobacco: Never Used   • Tobacco comment: one pack every other day   Substance and Sexual Activity   • Alcohol use: Yes     Alcohol/week: 0.5 oz     Types: 1 Glasses of wine per week     Comment: twice a year   • Drug use: Yes     Comment: marijuana on occasion   • Sexual  "activity: Yes     Partners: Male     Birth control/protection: Pill   Lifestyle   • Physical activity:     Days per week: Not on file     Minutes per session: Not on file   • Stress: Not on file   Relationships   • Social connections:     Talks on phone: Not on file     Gets together: Not on file     Attends Jehovah's witness service: Not on file     Active member of club or organization: Not on file     Attends meetings of clubs or organizations: Not on file     Relationship status: Not on file   • Intimate partner violence:     Fear of current or ex partner: Not on file     Emotionally abused: Not on file     Physically abused: Not on file     Forced sexual activity: Not on file   Other Topics Concern   • Not on file   Social History Narrative   • Not on file         Medications:    Current Outpatient Medications:   •  SYNTHROID 112 MCG Tab, Take 1 Tab by mouth Every morning on an empty stomach., Disp: 30 Tab, Rfl: 3  •  pantoprazole (PROTONIX) 40 MG Tablet Delayed Response, Take 1 Tab by mouth every day., Disp: 30 Tab, Rfl: 2  •  vitamin D, Ergocalciferol, (DRISDOL) 94028 units Cap capsule, Take 1 Cap by mouth every 7 days., Disp: 4 Cap, Rfl: 3  •  Prenatal Multivit-Min-Fe-FA (PRENATAL/IRON PO), Take  by mouth., Disp: , Rfl:     Labs: Reviewed as above     Physical Examination:  Vital signs: /64 (BP Location: Left arm, Patient Position: Sitting, BP Cuff Size: Adult)   Pulse 97   Ht 1.664 m (5' 5.5\")   Wt 57.6 kg (127 lb)   SpO2 97%   BMI 20.81 kg/m²  Body mass index is 20.81 kg/m².  General: No apparent distress, cooperative  Eyes: No scleral icterus, no discharge, normal eyelids  Neck: No abnormal masses on inspection, normal thyroid exam  Resp: Normal effort, clear to auscultation bilaterally  CVS: Regular rate and rhythm,   Skin: tattoos   Psych: Alert and oriented, normal mood and affect, intact memory and able to make informed decisions.    Assessment and Plan:  1. Hypothyroidism, unspecified " type  Increase-   Synthroid 112 mcg with extra 1/2 tablet in preporation that she is going to try to get pregnant     She will contact if she become pregnant     2. Vitamin D deficiency:   Continue Vitamin D replacement     Return in about 3 months (around 3/11/2020) for or sooner if pregnant .    Thank you for allowing me to participate in the care of this patient.  If you have any questions or concerns please do not hesitate to contact me.    Marlen Gooden P.A.-C.    CC:   Parmjit Arteaga A.P.SYED.    This note was created using voice recognition software (Dragon). The accuracy of the dictation is limited by the abilities of the software. I have reviewed the note prior to signing, however some errors in grammar and context are still possible. If you have any questions related to this note please do not hesitate to contact our office.

## 2019-12-16 RX ORDER — ERGOCALCIFEROL 1.25 MG/1
50000 CAPSULE ORAL
Qty: 4 CAP | Refills: 6 | Status: SHIPPED | OUTPATIENT
Start: 2019-12-16 | End: 2020-01-29 | Stop reason: SDUPTHER

## 2019-12-16 NOTE — TELEPHONE ENCOUNTER
**Last office visit 12/11/19 **    Was the patient seen in the last year in this department? Yes    Does patient have an active prescription for medications requested? Yes    Received Request Via: Pharmacy    Day supply: 30

## 2019-12-30 ENCOUNTER — TELEPHONE (OUTPATIENT)
Dept: ENDOCRINOLOGY | Facility: MEDICAL CENTER | Age: 34
End: 2019-12-30

## 2019-12-30 NOTE — TELEPHONE ENCOUNTER
1. Caller Name: Sophy                                         Call Back Number: [phone] (home) [phone] (work)        Patient approves a detailed voicemail message: yes    Patient  called saying that she is currently experiencing heart palpitations and insomnia possibly from changing her thyroid medication amount. Last saw Marlen 12/11/19 because she is trying to get pregnant but is worried about these symptoms.  Results for SOPHY ESQUIVEL (MRN [id number]) as of 12/30/2019 08:25   Ref. Range 10/28/2019 17:49 12/9/2019 17:26   TSH Latest Ref Range: 0.380 - 5.330 uIU/mL 8.460 (H) 1.320   Free T-4 Latest Ref Range: 0.53 - 1.43 ng/dL 0.93 1.15   T3 Latest Ref Range: 60.0 - 181.0 ng/dL 97.5 127.2   T3,Free Latest Ref Range: 2.40 - 4.20 pg/mL 3.45 3.40

## 2020-01-27 ENCOUNTER — HOSPITAL ENCOUNTER (OUTPATIENT)
Dept: LAB | Facility: MEDICAL CENTER | Age: 35
End: 2020-01-27
Attending: PHYSICIAN ASSISTANT
Payer: COMMERCIAL

## 2020-01-27 DIAGNOSIS — E03.9 ACQUIRED HYPOTHYROIDISM: ICD-10-CM

## 2020-01-27 LAB
T4 FREE SERPL-MCNC: 1.18 NG/DL (ref 0.53–1.43)
TSH SERPL DL<=0.005 MIU/L-ACNC: 0.54 UIU/ML (ref 0.38–5.33)

## 2020-01-27 PROCEDURE — 84439 ASSAY OF FREE THYROXINE: CPT

## 2020-01-27 PROCEDURE — 84443 ASSAY THYROID STIM HORMONE: CPT

## 2020-01-27 PROCEDURE — 36415 COLL VENOUS BLD VENIPUNCTURE: CPT

## 2020-01-29 ENCOUNTER — OFFICE VISIT (OUTPATIENT)
Dept: ENDOCRINOLOGY | Facility: MEDICAL CENTER | Age: 35
End: 2020-01-29
Payer: COMMERCIAL

## 2020-01-29 VITALS
HEIGHT: 65 IN | SYSTOLIC BLOOD PRESSURE: 108 MMHG | WEIGHT: 128.2 LBS | OXYGEN SATURATION: 98 % | DIASTOLIC BLOOD PRESSURE: 74 MMHG | BODY MASS INDEX: 21.36 KG/M2 | HEART RATE: 123 BPM

## 2020-01-29 DIAGNOSIS — E03.9 ACQUIRED HYPOTHYROIDISM: ICD-10-CM

## 2020-01-29 DIAGNOSIS — E55.9 VITAMIN D DEFICIENCY: ICD-10-CM

## 2020-01-29 DIAGNOSIS — E06.3 HASHIMOTO'S THYROIDITIS: ICD-10-CM

## 2020-01-29 PROCEDURE — 99214 OFFICE O/P EST MOD 30 MIN: CPT | Performed by: INTERNAL MEDICINE

## 2020-01-29 RX ORDER — LEVOTHYROXINE SODIUM 112 MCG
112 TABLET ORAL
Qty: 30 TAB | Refills: 5 | Status: SHIPPED | OUTPATIENT
Start: 2020-01-29 | End: 2020-07-29 | Stop reason: SDUPTHER

## 2020-01-29 RX ORDER — ERGOCALCIFEROL 1.25 MG/1
50000 CAPSULE ORAL
Qty: 4 CAP | Refills: 6 | Status: SHIPPED | OUTPATIENT
Start: 2020-01-29 | End: 2020-07-29 | Stop reason: SDUPTHER

## 2020-01-29 NOTE — PROGRESS NOTES
Chief Complaint: Follow up for Primary Hypothyroidism    HPI:     Roxane Toth is a 35 y.o. female here for follow up of Primary Hypothyroidism.      She was previously a patient of DEYSI Arce and this is my first time seeing her today.    Since last visit patient reports feeling excellent.  She remains on Synthroid 112mcg daily which has been her dose for the past 3 months.   She reports excellent compliance and denies missing any daily doses.   She takes thyroid hormone prior to breakfast.   She  denies taking any iron, calcium supplements or antacids.      Weight has been stable    She currently denies denies fatigue, weight changes, heat/cold intolerance, bowel/skin changes or CVS symptoms.     She currently denies anxiousness, feeling excessive energy, tremulousness, palpitations, sweating, weight loss, diarrhea.     She is currently trying to conceive    Her most recent TSH is optimal at 0.540 with normal free T4 levels of 1.18 on Jan 27, 2020    She also has a history of vitamin D deficiency and is on replacement therapy her last vitamin D was adequate at 48 on December 29, 2019    I personally reviewed the images of her distant ultrasound from June 2017 which showed a heterogenous thyroid with no focal nodules.  This is compatible with Hashimoto's thyroiditis per my read.      Patient's medications, allergies, and social histories were reviewed and updated as appropriate.      ROS:     CONS:     No fever, no chills   EYES:     No diplopia, no blurry vision   CV:           No chest pain, no palpitations   PULM:     No SOB, no cough, no hemoptysis.   GI:            No nausea, no vomiting, no diarrhea, no constipation   ENDO:     No polyuria, no polydipsia, no heat intolerance, no cold intolerance       Past Medical History:  Problem List:  2019-01: Tobacco abuse  2017-05: Hot flashes  2017-02: Dyspepsia  2015-05: Subclinical hypothyroidism  2015-05: Family history of thyroid  "cancer  -: Hypothyroidism  2015: Migraine  2013-10: Abdominal pain, other specified site  2012: Health examination of defined subpopulation      Past Surgical History:  Past Surgical History:   Procedure Laterality Date   • GASTROSCOPY-ENDO  10/25/2013    Performed by Zohaib Felix M.D. at SURGERY [facility]   • EGD WITH ASP/BX  10/25/2013    Performed by Zohaib Felix M.D. at [facility]   • ELBOW ORIF      right elbow   • TONSILLECTOMY AND ADENOIDECTOMY  as child        Allergies:  Iodine     Social History:  Social History     Tobacco Use   • Smoking status: Former Smoker     Packs/day: 1.00     Years: 17.00     Pack years: 17.00     Types: Cigarettes     Last attempt to quit: 2020     Years since quittin.0   • Smokeless tobacco: Never Used   • Tobacco comment: one pack every other day   Substance Use Topics   • Alcohol use: Yes     Alcohol/week: 0.5 oz     Types: 1 Glasses of wine per week     Comment: twice a year   • Drug use: Yes     Comment: marijuana on occasion        Family History:   family history includes Cancer in her mother; Diabetes in her unknown relative; Heart Disease in her unknown relative; Hypertension in her unknown relative; Other in her father; Stroke in her unknown relative.      PHYSICAL EXAM:   Vital signs: /74 (BP Location: Left arm, Patient Position: Sitting)   Pulse (!) 123   Ht 1.651 m (5' 5\")   Wt 58.2 kg (128 lb 3.2 oz)   SpO2 98%   BMI 21.33 kg/m²   GENERAL: Well-developed, well-nourished in no apparent distress.   EYE:  No ocular asymmetry, PERRLA  HENT: Pink, moist mucous membranes.    NECK: Thyroid is slightly enlarged and feels bosselated  CARDIOVASCULAR:  No murmurs  LUNGS: Clear breath sounds  ABDOMEN: Soft, nontender   EXTREMITIES: No clubbing, cyanosis, or edema.   NEUROLOGICAL: No gross focal motor abnormalities   LYMPH: No cervical adenopathy palpated.   SKIN: No rashes, lesions.       Labs:  Lab " Results   Component Value Date/Time    SODIUM 138 01/19/2019 07:23 AM    POTASSIUM 3.7 01/19/2019 07:23 AM    CHLORIDE 105 01/19/2019 07:23 AM    CO2 25 01/19/2019 07:23 AM    ANION 8.0 01/19/2019 07:23 AM    GLUCOSE 90 08/28/2019 07:07 AM    BUN 10 01/19/2019 07:23 AM    CREATININE 0.73 01/19/2019 07:23 AM    CREATININE 0.7 07/30/2007 09:55 PM    CALCIUM 9.8 01/19/2019 07:23 AM    ASTSGOT 18 01/19/2019 07:23 AM    ALTSGPT 12 01/19/2019 07:23 AM    TBILIRUBIN 0.7 01/19/2019 07:23 AM    ALBUMIN 4.5 01/19/2019 07:23 AM    TOTPROTEIN 7.3 01/19/2019 07:23 AM    GLOBULIN 2.8 01/19/2019 07:23 AM    AGRATIO 1.6 01/19/2019 07:23 AM       Lab Results   Component Value Date/Time    SODIUM 138 01/19/2019 0723    POTASSIUM 3.7 01/19/2019 0723    CHLORIDE 105 01/19/2019 0723    CO2 25 01/19/2019 0723    GLUCOSE 90 08/28/2019 0707    BUN 10 01/19/2019 0723    CREATININE 0.73 01/19/2019 0723    CALCIUM 9.8 01/19/2019 0723    ANION 8.0 01/19/2019 0723       Lab Results   Component Value Date/Time    CHOLSTRLTOT 147 08/28/2019 0707    TRIGLYCERIDE 47 08/28/2019 0707    HDL 60 08/28/2019 0707    LDL 78 08/28/2019 0707       Lab Results   Component Value Date/Time    TSHULTRASEN 0.540 01/27/2020 1730     Lab Results   Component Value Date/Time    FREET4 1.18 01/27/2020 1730     Lab Results   Component Value Date/Time    FREET3 3.40 12/09/2019 1726     No results found for: THYSTIMIG    Lab Results   Component Value Date/Time    MICROSOMALA 1556.9 (H) 05/23/2015 1043         Imaging: please refer to US from June 2017      ASSESSMENT/PLAN:     1. Acquired hypothyroidism  Fairly controlled  TSH levels are optimal  Continue Synthroid 112 MCG daily  Explained to the patient that she needs to contact us once pregnancy is diagnosed we can adjust her thyroid hormone replacement therapy because this is critical especially during the first trimester of pregnancy  Reviewed the symptoms of uncontrolled hypothyroidism  Reviewed the importance of  adherence to replacement therapy  We will plan for follow-up in 6 months with repeat of TSH level  She should notify me if she needs to come back to see me sooner if needed      2. Hashimoto's thyroiditis  This is the etiology of her hypothyroidism  As proven by her elevated TPO antibodies of 1500 from May 2015    3. Vitamin D deficiency  Vitamin D levels are adequate  Continue replacement therapy with vitamin D3 1000 to 2000 units daily  Repeat her 25-hydroxy vitamin D levels and calcium levels with her next labs      Return in about 6 months (around 7/29/2020).       Thank you kindly for allowing me to participate in the thyroid care plan for this patient.    David Schmitz MD, FACE, ECNU  01/29/20    CC:   JOSEPH Goodman

## 2020-04-20 ENCOUNTER — HOSPITAL ENCOUNTER (OUTPATIENT)
Dept: LAB | Facility: MEDICAL CENTER | Age: 35
End: 2020-04-20
Attending: INTERNAL MEDICINE
Payer: COMMERCIAL

## 2020-04-20 DIAGNOSIS — E03.9 ACQUIRED HYPOTHYROIDISM: ICD-10-CM

## 2020-04-20 LAB
T4 FREE SERPL-MCNC: 1.49 NG/DL (ref 0.53–1.43)
TSH SERPL DL<=0.005 MIU/L-ACNC: 0.43 UIU/ML (ref 0.38–5.33)

## 2020-04-20 PROCEDURE — 84439 ASSAY OF FREE THYROXINE: CPT

## 2020-04-20 PROCEDURE — 84443 ASSAY THYROID STIM HORMONE: CPT

## 2020-04-20 PROCEDURE — 36415 COLL VENOUS BLD VENIPUNCTURE: CPT

## 2020-04-21 RX ORDER — FUROSEMIDE 20 MG/1
20 TABLET ORAL
Qty: 15 TAB | Refills: 1 | Status: SHIPPED | OUTPATIENT
Start: 2020-04-21 | End: 2022-01-12

## 2020-07-22 ENCOUNTER — HOSPITAL ENCOUNTER (OUTPATIENT)
Dept: LAB | Facility: MEDICAL CENTER | Age: 35
End: 2020-07-22
Attending: INTERNAL MEDICINE
Payer: COMMERCIAL

## 2020-07-22 DIAGNOSIS — E06.3 HASHIMOTO'S THYROIDITIS: ICD-10-CM

## 2020-07-22 DIAGNOSIS — E55.9 VITAMIN D DEFICIENCY: ICD-10-CM

## 2020-07-22 DIAGNOSIS — E03.9 ACQUIRED HYPOTHYROIDISM: ICD-10-CM

## 2020-07-22 PROCEDURE — 80053 COMPREHEN METABOLIC PANEL: CPT

## 2020-07-22 PROCEDURE — 36415 COLL VENOUS BLD VENIPUNCTURE: CPT

## 2020-07-22 PROCEDURE — 84443 ASSAY THYROID STIM HORMONE: CPT

## 2020-07-22 PROCEDURE — 84439 ASSAY OF FREE THYROXINE: CPT

## 2020-07-22 PROCEDURE — 82306 VITAMIN D 25 HYDROXY: CPT

## 2020-07-23 LAB
25(OH)D3 SERPL-MCNC: 45 NG/ML (ref 30–100)
ALBUMIN SERPL BCP-MCNC: 4.4 G/DL (ref 3.2–4.9)
ALBUMIN/GLOB SERPL: 1.8 G/DL
ALP SERPL-CCNC: 47 U/L (ref 30–99)
ALT SERPL-CCNC: 11 U/L (ref 2–50)
ANION GAP SERPL CALC-SCNC: 11 MMOL/L (ref 7–16)
AST SERPL-CCNC: 16 U/L (ref 12–45)
BILIRUB SERPL-MCNC: 0.4 MG/DL (ref 0.1–1.5)
BUN SERPL-MCNC: 9 MG/DL (ref 8–22)
CALCIUM SERPL-MCNC: 9.1 MG/DL (ref 8.5–10.5)
CHLORIDE SERPL-SCNC: 106 MMOL/L (ref 96–112)
CO2 SERPL-SCNC: 23 MMOL/L (ref 20–33)
CREAT SERPL-MCNC: 0.69 MG/DL (ref 0.5–1.4)
FASTING STATUS PATIENT QL REPORTED: NORMAL
GLOBULIN SER CALC-MCNC: 2.4 G/DL (ref 1.9–3.5)
GLUCOSE SERPL-MCNC: 96 MG/DL (ref 65–99)
POTASSIUM SERPL-SCNC: 4.3 MMOL/L (ref 3.6–5.5)
PROT SERPL-MCNC: 6.8 G/DL (ref 6–8.2)
SODIUM SERPL-SCNC: 140 MMOL/L (ref 135–145)
T4 FREE SERPL-MCNC: 1.6 NG/DL (ref 0.93–1.7)
TSH SERPL DL<=0.005 MIU/L-ACNC: 0.22 UIU/ML (ref 0.38–5.33)

## 2020-07-29 ENCOUNTER — OFFICE VISIT (OUTPATIENT)
Dept: ENDOCRINOLOGY | Facility: MEDICAL CENTER | Age: 35
End: 2020-07-29
Attending: INTERNAL MEDICINE
Payer: COMMERCIAL

## 2020-07-29 VITALS
OXYGEN SATURATION: 97 % | BODY MASS INDEX: 22.71 KG/M2 | HEIGHT: 65 IN | WEIGHT: 136.3 LBS | DIASTOLIC BLOOD PRESSURE: 72 MMHG | SYSTOLIC BLOOD PRESSURE: 110 MMHG | HEART RATE: 102 BPM

## 2020-07-29 DIAGNOSIS — E55.9 VITAMIN D DEFICIENCY: ICD-10-CM

## 2020-07-29 DIAGNOSIS — E03.9 ACQUIRED HYPOTHYROIDISM: ICD-10-CM

## 2020-07-29 DIAGNOSIS — E06.3 HASHIMOTO'S THYROIDITIS: ICD-10-CM

## 2020-07-29 PROCEDURE — 99211 OFF/OP EST MAY X REQ PHY/QHP: CPT | Performed by: INTERNAL MEDICINE

## 2020-07-29 PROCEDURE — 99214 OFFICE O/P EST MOD 30 MIN: CPT | Performed by: INTERNAL MEDICINE

## 2020-07-29 RX ORDER — LEVOTHYROXINE SODIUM 112 MCG
112 TABLET ORAL
Qty: 90 TAB | Refills: 3 | Status: SHIPPED | OUTPATIENT
Start: 2020-07-29 | End: 2020-08-03

## 2020-07-29 RX ORDER — ERGOCALCIFEROL 1.25 MG/1
50000 CAPSULE ORAL
Qty: 12 CAP | Refills: 2 | Status: SHIPPED | OUTPATIENT
Start: 2020-07-29 | End: 2021-01-27 | Stop reason: SDUPTHER

## 2020-07-29 ASSESSMENT — FIBROSIS 4 INDEX: FIB4 SCORE: 0.86

## 2020-07-29 NOTE — PROGRESS NOTES
Chief Complaint: Follow up for Primary Hypothyroidism    HPI:     Roxane Toth is a 35 y.o. female here for follow up of Primary Hypothyroidism.      She was previously a patient of DEYSI Arce    Since last visit patient reports feeling excellent.  She remains on Synthroid 112mcg daily which has been her dose for the past 9-12 months.   She reports excellent compliance and denies missing any daily doses.   She takes thyroid hormone prior to breakfast.   She  denies taking any iron, calcium supplements or antacids.      Weight has been stable    She still reports that she is having a hard time conceive but she has not seen a gynecologist or reproductive specialist.  She has been  for over 6 years and reports frequent sexual contact with her .  Her  has been evaluated      Her most recent TSH is slightly low at 0.220 with a normal free T4 of 1.6 on July 2020 prior to this her TSH was optimal at 0.434 and 0.540 on April and January 2020.      Despite the slightly low TSH she denies tremors, palpitations and insomnia      She also has a history of vitamin D deficiency and is on replacement therapy her last vitamin D was adequate at 45 on July 2020    I personally reviewed the images of her distant ultrasound from June 2017 which showed a heterogenous thyroid with no focal nodules.  This is compatible with Hashimoto's thyroiditis per my read.      Patient's medications, allergies, and social histories were reviewed and updated as appropriate.      ROS:     CONS:     No fever, no chills   EYES:     No diplopia, no blurry vision   CV:           No chest pain, no palpitations   PULM:     No SOB, no cough, no hemoptysis.   GI:            No nausea, no vomiting, no diarrhea, no constipation   ENDO:     No polyuria, no polydipsia, no heat intolerance, no cold intolerance       Past Medical History:  Problem List:  2020-01: Hashimoto's thyroiditis  2020-01: Vitamin D deficiency  2019-01:  "Tobacco abuse  2017: Hot flashes  2017: Dyspepsia  2015: Subclinical hypothyroidism  2015: Family history of thyroid cancer  2015: Hypothyroidism  2015: Migraine  2013-10: Abdominal pain, other specified site  2012: Health examination of defined subpopulation      Past Surgical History:  Past Surgical History:   Procedure Laterality Date   • GASTROSCOPY-ENDO  10/25/2013    Performed by Zohaib Felix M.D. at SUR[facility]ERY [facility]   • EGD WITH ASP/BX  10/25/2013    Performed by Zohaib Felix M.D. at [facility]   • ELBOW ORIF  1991    right elbow   • TONSILLECTOMY AND ADENOIDECTOMY  as child        Allergies:  Iodine     Social History:  Social History     Tobacco Use   • Smoking status: Former Smoker     Packs/day: 1.00     Years: 17.00     Pack years: 17.00     Types: Cigarettes     Last attempt to quit: 2020     Years since quittin.5   • Smokeless tobacco: Never Used   • Tobacco comment: one pack every other day   Substance Use Topics   • Alcohol use: Yes     Alcohol/week: 0.5 oz     Types: 1 Glasses of wine per week     Comment: twice a year   • Drug use: Yes     Comment: marijuana on occasion        Family History:   family history includes Cancer in her mother; Diabetes in her unknown relative; Heart Disease in her unknown relative; Hypertension in her unknown relative; Other in her father; Stroke in her unknown relative.      PHYSICAL EXAM:   Vital signs: /72 (BP Location: Left arm, Patient Position: Sitting, BP Cuff Size: Adult)   Pulse (!) 102   Ht 1.651 m (5' 5\")   Wt 61.8 kg (136 lb 4.8 oz)   SpO2 97%   BMI 22.68 kg/m²   GENERAL: Well-developed, well-nourished in no apparent distress.   EYE:  No ocular asymmetry, PERRLA  HENT: Pink, moist mucous membranes.    NECK: Thyroid is slightly enlarged and feels bosselated  CARDIOVASCULAR:  No murmurs  LUNGS: Clear breath sounds  ABDOMEN: Soft, nontender   EXTREMITIES: No clubbing, cyanosis, " or edema.   NEUROLOGICAL: No gross focal motor abnormalities   LYMPH: No cervical adenopathy palpated.   SKIN: No rashes, lesions.       Labs:  Lab Results   Component Value Date/Time    SODIUM 140 07/22/2020 02:36 PM    POTASSIUM 4.3 07/22/2020 02:36 PM    CHLORIDE 106 07/22/2020 02:36 PM    CO2 23 07/22/2020 02:36 PM    ANION 11.0 07/22/2020 02:36 PM    GLUCOSE 96 07/22/2020 02:36 PM    BUN 9 07/22/2020 02:36 PM    CREATININE 0.69 07/22/2020 02:36 PM    CREATININE 0.7 07/30/2007 09:55 PM    CALCIUM 9.1 07/22/2020 02:36 PM    ASTSGOT 16 07/22/2020 02:36 PM    ALTSGPT 11 07/22/2020 02:36 PM    TBILIRUBIN 0.4 07/22/2020 02:36 PM    ALBUMIN 4.4 07/22/2020 02:36 PM    TOTPROTEIN 6.8 07/22/2020 02:36 PM    GLOBULIN 2.4 07/22/2020 02:36 PM    AGRATIO 1.8 07/22/2020 02:36 PM       Lab Results   Component Value Date/Time    SODIUM 138 01/19/2019 0723    POTASSIUM 3.7 01/19/2019 0723    CHLORIDE 105 01/19/2019 0723    CO2 25 01/19/2019 0723    GLUCOSE 90 08/28/2019 0707    BUN 10 01/19/2019 0723    CREATININE 0.73 01/19/2019 0723    CALCIUM 9.8 01/19/2019 0723    ANION 8.0 01/19/2019 0723       Lab Results   Component Value Date/Time    CHOLSTRLTOT 147 08/28/2019 0707    TRIGLYCERIDE 47 08/28/2019 0707    HDL 60 08/28/2019 0707    LDL 78 08/28/2019 0707       Lab Results   Component Value Date/Time    TSHULTRASEN 0.540 01/27/2020 1730     Lab Results   Component Value Date/Time    FREET4 1.18 01/27/2020 1730     Lab Results   Component Value Date/Time    FREET3 3.40 12/09/2019 1726     No results found for: THYSTIMIG    Lab Results   Component Value Date/Time    MICROSOMALA 1556.9 (H) 05/23/2015 1043         Imaging: please refer to US from June 2017      ASSESSMENT/PLAN:     1. Acquired hypothyroidism  Fairly controlled  TSH levels have been optimal in the past  The TSH is slightly low however this is still within tolerable limits and should not affect her fertility  I recommend that she see a gynecologist and reproductive  specialist to assist with fertility issues  Continue Synthroid 112 MCG daily  Explained to the patient that she needs to contact us once pregnancy is diagnosed we can adjust her thyroid hormone replacement therapy because this is critical especially during the first trimester of pregnancy  Reviewed the symptoms of uncontrolled hypothyroidism  Reviewed the importance of adherence to replacement therapy  We will plan for follow-up in 6 months with repeat of TSH level  She should notify me if she needs to come back to see me sooner if needed      2. Hashimoto's thyroiditis  This is the etiology of her hypothyroidism  As proven by her elevated TPO antibodies of 1500 from May 2015    3. Vitamin D deficiency  Vitamin D levels are adequate  Continue replacement therapy with vitamin D3 1000 to 2000 units daily  Repeat her 25-hydroxy vitamin D levels and calcium levels with her next labs      Return in about 6 months (around 1/29/2021).       Thank you kindly for allowing me to participate in the thyroid care plan for this patient.    David Schmitz MD, FACE, [facility]  01/29/20    CC:   JOSEPH Goodman

## 2020-08-03 DIAGNOSIS — E03.9 ACQUIRED HYPOTHYROIDISM: ICD-10-CM

## 2020-08-03 RX ORDER — LEVOTHYROXINE SODIUM 0.1 MG/1
100 TABLET ORAL
Qty: 30 TAB | Refills: 11 | Status: SHIPPED | OUTPATIENT
Start: 2020-08-03 | End: 2021-01-27

## 2020-08-03 RX ORDER — LEVOTHYROXINE SODIUM 0.1 MG/1
100 TABLET ORAL
Qty: 30 TAB | Refills: 11 | Status: SHIPPED | OUTPATIENT
Start: 2020-08-03 | End: 2020-08-03 | Stop reason: SDUPTHER

## 2020-08-08 ENCOUNTER — HOSPITAL ENCOUNTER (OUTPATIENT)
Dept: LAB | Facility: MEDICAL CENTER | Age: 35
End: 2020-08-08
Payer: COMMERCIAL

## 2020-08-08 LAB
BDY FAT % MEASURED: 25.5 %
BP DIAS: 61 MMHG
BP SYS: 104 MMHG
CHOLEST SERPL-MCNC: 161 MG/DL (ref 100–199)
DIABETES HTDIA: NO
EVENT NAME HTEVT: NORMAL
FASTING HTFAS: YES
GLUCOSE SERPL-MCNC: 92 MG/DL (ref 65–99)
HDLC SERPL-MCNC: 73 MG/DL
HYPERTENSION HTHYP: NO
LDLC SERPL CALC-MCNC: 79 MG/DL
SCREENING LOC CITY HTCIT: NORMAL
SCREENING LOC STATE HTSTA: NORMAL
SCREENING LOCATION HTLOC: NORMAL
SMOKING HTSMO: NO
SUBSCRIBER ID HTSID: NORMAL
TRIGL SERPL-MCNC: 46 MG/DL (ref 0–149)

## 2020-08-08 PROCEDURE — 82947 ASSAY GLUCOSE BLOOD QUANT: CPT

## 2020-08-08 PROCEDURE — 36415 COLL VENOUS BLD VENIPUNCTURE: CPT

## 2020-08-08 PROCEDURE — 80061 LIPID PANEL: CPT

## 2020-08-08 PROCEDURE — S5190 WELLNESS ASSESSMENT BY NONPH: HCPCS

## 2020-09-11 DIAGNOSIS — E03.9 ACQUIRED HYPOTHYROIDISM: ICD-10-CM

## 2020-09-11 RX ORDER — LEVOTHYROXINE SODIUM 112 MCG
112 TABLET ORAL
Qty: 30 TAB | Refills: 5 | Status: SHIPPED | OUTPATIENT
Start: 2020-09-11 | End: 2021-01-27 | Stop reason: SDUPTHER

## 2020-09-11 NOTE — TELEPHONE ENCOUNTER
Received request via: Pharmacy    Was the patient seen in the last year in this department? Yes    Does the patient have an active prescription (recently filled or refills available) for medication(s) requested? No     SYNTHROID 112 MCG Tab       Sig: Take 1 Tab by mouth Every morning on an empty stomach.

## 2020-09-16 ENCOUNTER — IMMUNIZATION (OUTPATIENT)
Dept: SOCIAL WORK | Facility: CLINIC | Age: 35
End: 2020-09-16
Payer: COMMERCIAL

## 2020-09-16 DIAGNOSIS — Z23 NEED FOR VACCINATION: ICD-10-CM

## 2020-09-16 PROCEDURE — 90686 IIV4 VACC NO PRSV 0.5 ML IM: CPT | Performed by: REGISTERED NURSE

## 2020-12-20 ENCOUNTER — APPOINTMENT (OUTPATIENT)
Dept: FAMILY PLANNING/WOMEN'S HEALTH CLINIC | Facility: IMMUNIZATION CENTER | Age: 35
End: 2020-12-20
Attending: FAMILY MEDICINE
Payer: COMMERCIAL

## 2020-12-20 ENCOUNTER — IMMUNIZATION (OUTPATIENT)
Dept: FAMILY PLANNING/WOMEN'S HEALTH CLINIC | Facility: IMMUNIZATION CENTER | Age: 35
End: 2020-12-20
Payer: COMMERCIAL

## 2020-12-20 DIAGNOSIS — Z23 NEED FOR VACCINATION: ICD-10-CM

## 2020-12-20 DIAGNOSIS — Z23 ENCOUNTER FOR VACCINATION: Primary | ICD-10-CM

## 2020-12-20 PROCEDURE — 0001A PFIZER SARS-COV-2 VACCINE: CPT

## 2020-12-20 PROCEDURE — 91300 PFIZER SARS-COV-2 VACCINE: CPT

## 2021-01-08 ENCOUNTER — APPOINTMENT (OUTPATIENT)
Dept: DERMATOLOGY | Facility: IMAGING CENTER | Age: 36
End: 2021-01-08

## 2021-01-10 ENCOUNTER — IMMUNIZATION (OUTPATIENT)
Dept: FAMILY PLANNING/WOMEN'S HEALTH CLINIC | Facility: IMMUNIZATION CENTER | Age: 36
End: 2021-01-10
Attending: FAMILY MEDICINE
Payer: COMMERCIAL

## 2021-01-10 DIAGNOSIS — Z23 ENCOUNTER FOR VACCINATION: Primary | ICD-10-CM

## 2021-01-10 PROCEDURE — 0002A PFIZER SARS-COV-2 VACCINE: CPT

## 2021-01-10 PROCEDURE — 91300 PFIZER SARS-COV-2 VACCINE: CPT

## 2021-01-23 ENCOUNTER — HOSPITAL ENCOUNTER (OUTPATIENT)
Dept: LAB | Facility: MEDICAL CENTER | Age: 36
End: 2021-01-23
Attending: INTERNAL MEDICINE
Payer: COMMERCIAL

## 2021-01-23 DIAGNOSIS — E03.9 ACQUIRED HYPOTHYROIDISM: ICD-10-CM

## 2021-01-23 DIAGNOSIS — E55.9 VITAMIN D DEFICIENCY: ICD-10-CM

## 2021-01-23 DIAGNOSIS — E06.3 HASHIMOTO'S THYROIDITIS: ICD-10-CM

## 2021-01-23 LAB
25(OH)D3 SERPL-MCNC: 32 NG/ML (ref 30–100)
ALBUMIN SERPL BCP-MCNC: 4.5 G/DL (ref 3.2–4.9)
ALBUMIN/GLOB SERPL: 1.7 G/DL
ALP SERPL-CCNC: 58 U/L (ref 30–99)
ALT SERPL-CCNC: 21 U/L (ref 2–50)
ANION GAP SERPL CALC-SCNC: 7 MMOL/L (ref 7–16)
AST SERPL-CCNC: 22 U/L (ref 12–45)
BILIRUB SERPL-MCNC: 0.4 MG/DL (ref 0.1–1.5)
BUN SERPL-MCNC: 11 MG/DL (ref 8–22)
CALCIUM SERPL-MCNC: 9.4 MG/DL (ref 8.5–10.5)
CHLORIDE SERPL-SCNC: 108 MMOL/L (ref 96–112)
CO2 SERPL-SCNC: 26 MMOL/L (ref 20–33)
CREAT SERPL-MCNC: 0.71 MG/DL (ref 0.5–1.4)
GLOBULIN SER CALC-MCNC: 2.6 G/DL (ref 1.9–3.5)
GLUCOSE SERPL-MCNC: 79 MG/DL (ref 65–99)
POTASSIUM SERPL-SCNC: 4.9 MMOL/L (ref 3.6–5.5)
PROT SERPL-MCNC: 7.1 G/DL (ref 6–8.2)
SODIUM SERPL-SCNC: 141 MMOL/L (ref 135–145)
T4 FREE SERPL-MCNC: 1.34 NG/DL (ref 0.93–1.7)
TSH SERPL DL<=0.005 MIU/L-ACNC: 1.09 UIU/ML (ref 0.38–5.33)

## 2021-01-23 PROCEDURE — 84443 ASSAY THYROID STIM HORMONE: CPT

## 2021-01-23 PROCEDURE — 36415 COLL VENOUS BLD VENIPUNCTURE: CPT

## 2021-01-23 PROCEDURE — 80053 COMPREHEN METABOLIC PANEL: CPT

## 2021-01-23 PROCEDURE — 82306 VITAMIN D 25 HYDROXY: CPT

## 2021-01-23 PROCEDURE — 84439 ASSAY OF FREE THYROXINE: CPT

## 2021-01-27 ENCOUNTER — TELEMEDICINE (OUTPATIENT)
Dept: ENDOCRINOLOGY | Facility: MEDICAL CENTER | Age: 36
End: 2021-01-27
Attending: INTERNAL MEDICINE
Payer: COMMERCIAL

## 2021-01-27 DIAGNOSIS — E03.9 ACQUIRED HYPOTHYROIDISM: ICD-10-CM

## 2021-01-27 DIAGNOSIS — E55.9 VITAMIN D DEFICIENCY: ICD-10-CM

## 2021-01-27 DIAGNOSIS — E06.3 HASHIMOTO'S THYROIDITIS: ICD-10-CM

## 2021-01-27 PROCEDURE — 99214 OFFICE O/P EST MOD 30 MIN: CPT | Mod: 95,CR | Performed by: INTERNAL MEDICINE

## 2021-01-27 RX ORDER — LEVOTHYROXINE SODIUM 112 MCG
112 TABLET ORAL
Qty: 30 TAB | Refills: 5 | Status: SHIPPED | OUTPATIENT
Start: 2021-01-27 | End: 2021-08-03

## 2021-01-27 RX ORDER — ERGOCALCIFEROL 1.25 MG/1
50000 CAPSULE ORAL
Qty: 12 CAP | Refills: 2 | Status: SHIPPED | OUTPATIENT
Start: 2021-01-27 | End: 2022-01-28

## 2021-01-27 NOTE — PROGRESS NOTES
Chief Complaint: Follow up for Primary Hypothyroidism. Patient was presented for a telehealth consultation via secure and encrypted videoconferencing technology. This encounter was conducted via Zoom . Verbal consent was obtained. Patient's identity was verified.    HPI:     Roxane Toth is a 35 y.o. female here for follow up of Primary Hypothyroidism.  She was previously a patient of DEYSI Arce    Since last visit patient reports feeling excellent.  She remains on Synthroid 112mcg daily which has been her dose for the past 9-12 months.   She reports excellent compliance and denies missing any daily doses.   She takes thyroid hormone prior to breakfast.   She  denies taking any iron, calcium supplements or antacids.      Weight has been stable    She still reports that she is having a hard time conceive however her previous work-up showed normal prolactin and total testosterone levels and she reports regular menstrual cycles with no luminal symptoms.  Last visit I recommend she see a fertility specialist.    She is also complaining of hair loss now.  We did discuss potential options for hair loss but given she is trying to get pregnant by recommended against trying spironolactone and recommended that she try Rogaine for women      Her most recent TSH is normal 1.0 with a free T4 of 1.3 on January 2021  Prior to this her TSH was slightly low at 0.22 with a free T4 of 1.6 on July 22, 2020      She also has a history of vitamin D deficiency and is on replacement therapy her last vitamin D was adequate at 32 on January 2021        Patient's medications, allergies, and social histories were reviewed and updated as appropriate.      ROS:     CONS:     No fever, no chills   EYES:     No diplopia, no blurry vision   CV:           No chest pain, no palpitations   PULM:     No SOB, no cough, no hemoptysis.   GI:            No nausea, no vomiting, no diarrhea, no constipation   ENDO:     No polyuria, no  polydipsia, no heat intolerance, no cold intolerance       Past Medical History:  Problem List:  2020: Hashimoto's thyroiditis  2020: Vitamin D deficiency  2019: Tobacco abuse  2017: Hot flashes  2017: Dyspepsia  2015: Subclinical hypothyroidism  2015: Family history of thyroid cancer  2015: Hypothyroidism  2015: Migraine  2013-10: Abdominal pain, other specified site  2012: Health examination of defined subpopulation      Past Surgical History:  Past Surgical History:   Procedure Laterality Date   • GASTROSCOPY-ENDO  10/25/2013    Performed by Zohaib Felix M.D. at [facility]   • EGD WITH ASP/BX  10/25/2013    Performed by Zohaib Felix M.D. at [facility]   • ELBOW ORIF  1991    right elbow   • TONSILLECTOMY AND ADENOIDECTOMY  as child        Allergies:  Iodine     Social History:  Social History     Tobacco Use   • Smoking status: Former Smoker     Packs/day: 1.00     Years: 17.00     Pack years: 17.00     Types: Cigarettes     Quit date: 2020     Years since quittin.0   • Smokeless tobacco: Never Used   • Tobacco comment: one pack every other day   Substance Use Topics   • Alcohol use: Yes     Alcohol/week: 0.5 oz     Types: 1 Glasses of wine per week     Comment: twice a year   • Drug use: Yes     Comment: marijuana on occasion        Family History:   family history includes Cancer in her mother; Diabetes in her unknown relative; Heart Disease in her unknown relative; Hypertension in her unknown relative; Other in her father; Stroke in her unknown relative.      PHYSICAL EXAM:   Vital signs: There were no vitals taken for this visit.  GENERAL: Well-developed, well-nourished in no apparent distress.   EYE:  No ocular asymmetry, PERRLA  HENT: Pink, moist mucous membranes.    NECK: Thyroid is slightly enlarged and feels bosselated  CARDIOVASCULAR:  No murmurs  LUNGS: Clear breath sounds  ABDOMEN: Soft, nontender   EXTREMITIES: No  clubbing, cyanosis, or edema.   NEUROLOGICAL: No gross focal motor abnormalities   LYMPH: No cervical adenopathy seen  SKIN: No rashes, lesions.       Labs:  Lab Results   Component Value Date/Time    SODIUM 141 01/23/2021 07:47 AM    POTASSIUM 4.9 01/23/2021 07:47 AM    CHLORIDE 108 01/23/2021 07:47 AM    CO2 26 01/23/2021 07:47 AM    ANION 7.0 01/23/2021 07:47 AM    GLUCOSE 79 01/23/2021 07:47 AM    BUN 11 01/23/2021 07:47 AM    CREATININE 0.71 01/23/2021 07:47 AM    CREATININE 0.7 07/30/2007 09:55 PM    CALCIUM 9.4 01/23/2021 07:47 AM    ASTSGOT 22 01/23/2021 07:47 AM    ALTSGPT 21 01/23/2021 07:47 AM    TBILIRUBIN 0.4 01/23/2021 07:47 AM    ALBUMIN 4.5 01/23/2021 07:47 AM    TOTPROTEIN 7.1 01/23/2021 07:47 AM    GLOBULIN 2.6 01/23/2021 07:47 AM    AGRATIO 1.7 01/23/2021 07:47 AM       Lab Results   Component Value Date/Time    SODIUM 138 01/19/2019 0723    POTASSIUM 3.7 01/19/2019 0723    CHLORIDE 105 01/19/2019 0723    CO2 25 01/19/2019 0723    GLUCOSE 90 08/28/2019 0707    BUN 10 01/19/2019 0723    CREATININE 0.73 01/19/2019 0723    CALCIUM 9.8 01/19/2019 0723    ANION 8.0 01/19/2019 0723       Lab Results   Component Value Date/Time    CHOLSTRLTOT 147 08/28/2019 0707    TRIGLYCERIDE 47 08/28/2019 0707    HDL 60 08/28/2019 0707    LDL 78 08/28/2019 0707       Lab Results   Component Value Date/Time    TSHULTRASEN 0.540 01/27/2020 1730     Lab Results   Component Value Date/Time    FREET4 1.18 01/27/2020 1730     Lab Results   Component Value Date/Time    FREET3 3.40 12/09/2019 1726     No results found for: THYSTIMIG    Lab Results   Component Value Date/Time    MICROSOMALA 1556.9 (H) 05/23/2015 1043         Imaging: please refer to US from June 2017      ASSESSMENT/PLAN:     1. Acquired hypothyroidism  Well-controlled continue Synthroid 112 mcg daily  Reviewed importance of adherence we will plan for repeat TSH levels in 6 months    I also recommend she try minoxidil for hair loss    2. Hashimoto's  thyroiditis  This is the etiology of her hypothyroidism  As proven by her elevated TPO antibodies of 1500 from May 2015    3. Vitamin D deficiency  Vitamin D levels are adequate  Continue ergocalciferol  Repeat her 25-hydroxy vitamin D levels and calcium levels with her next labs      Return in about 6 months (around 7/27/2021).       Thank you kindly for allowing me to participate in the thyroid care plan for this patient.    David Schmitz MD, [facility], [facility]  01/29/20    CC:   MARCY Goodman.

## 2021-02-02 RX ORDER — PANTOPRAZOLE SODIUM 40 MG/1
40 TABLET, DELAYED RELEASE ORAL
Qty: 14 TAB | Refills: 0 | Status: SHIPPED | OUTPATIENT
Start: 2021-02-02 | End: 2021-02-18 | Stop reason: SDUPTHER

## 2021-02-18 ENCOUNTER — OFFICE VISIT (OUTPATIENT)
Dept: MEDICAL GROUP | Facility: MEDICAL CENTER | Age: 36
End: 2021-02-18
Payer: COMMERCIAL

## 2021-02-18 VITALS
HEIGHT: 66 IN | DIASTOLIC BLOOD PRESSURE: 72 MMHG | WEIGHT: 145 LBS | RESPIRATION RATE: 16 BRPM | SYSTOLIC BLOOD PRESSURE: 118 MMHG | OXYGEN SATURATION: 97 % | BODY MASS INDEX: 23.3 KG/M2 | HEART RATE: 70 BPM

## 2021-02-18 DIAGNOSIS — K21.9 GASTROESOPHAGEAL REFLUX DISEASE WITHOUT ESOPHAGITIS: ICD-10-CM

## 2021-02-18 DIAGNOSIS — E06.3 HASHIMOTO'S THYROIDITIS: ICD-10-CM

## 2021-02-18 DIAGNOSIS — E55.9 VITAMIN D DEFICIENCY: ICD-10-CM

## 2021-02-18 PROBLEM — Z72.0 TOBACCO ABUSE: Status: RESOLVED | Noted: 2019-01-16 | Resolved: 2021-02-18

## 2021-02-18 PROCEDURE — 99213 OFFICE O/P EST LOW 20 MIN: CPT | Performed by: NURSE PRACTITIONER

## 2021-02-18 RX ORDER — PANTOPRAZOLE SODIUM 40 MG/1
40 TABLET, DELAYED RELEASE ORAL
Qty: 90 TABLET | Refills: 3 | Status: SHIPPED | OUTPATIENT
Start: 2021-02-18 | End: 2023-08-09 | Stop reason: SDUPTHER

## 2021-02-18 ASSESSMENT — FIBROSIS 4 INDEX: FIB4 SCORE: 0.88

## 2021-02-18 ASSESSMENT — ENCOUNTER SYMPTOMS: HEARTBURN: 1

## 2021-02-18 ASSESSMENT — PATIENT HEALTH QUESTIONNAIRE - PHQ9: CLINICAL INTERPRETATION OF PHQ2 SCORE: 0

## 2021-02-18 NOTE — PROGRESS NOTES
Subjective:      Roxane Toth is a 36 y.o. female who presents with Medication Refill (pantropazole)        CC: Patient is here today mainly for follow-up on acid reflux    HPI       1. Gastroesophageal reflux disease without esophagitis  Patient had requested refills on her Protonix but we had not seen her since 2019 and she was advised she needed to come in for refills.  She states she only uses it about once or twice per week when she has bad indigestion.  [facility] and no significant findings present.  She denies black stools, epigastric pain or vomiting.  She states she otherwise feels well and is trying to get pregnant.    2. Hashimoto's thyroiditis  Patient follows with endocrinology which is monitoring her TSH and prescribing her thyroid medication with her last TSH at 1.0.  Her chemistry panel was normal.    3. Vitamin D deficiency  Patient currently on vitamin D replacement for history of vitamin D deficiency.  Past Medical History:   Diagnosis Date   • Heart burn    • Indigestion    • Renal disorder 2011    kidney infection   • Thyroid disease      Social History     Socioeconomic History   • Marital status:      Spouse name: Not on file   • Number of children: Not on file   • Years of education: Not on file   • Highest education level: Not on file   Occupational History   • Not on file   Tobacco Use   • Smoking status: Former Smoker     Packs/day: 1.00     Years: 17.00     Pack years: 17.00     Types: Cigarettes     Quit date: 2020     Years since quittin.1   • Smokeless tobacco: Never Used   • Tobacco comment: one pack every other day   Substance and Sexual Activity   • Alcohol use: Yes     Alcohol/week: 0.5 oz     Types: 1 Glasses of wine per week     Comment: twice a year   • Drug use: Yes     Comment: marijuana on occasion   • Sexual activity: Yes     Partners: Male   Other Topics Concern   • Not on file   Social History  Narrative   • Not on file     Social Determinants of Health     Financial Resource Strain:    • Difficulty of Paying Living Expenses:    Food Insecurity:    • Worried About Running Out of Food in the Last Year:    • Ran Out of Food in the Last Year:    Transportation Needs:    • Lack of Transportation (Medical):    • Lack of Transportation (Non-Medical):    Physical Activity:    • Days of Exercise per Week:    • Minutes of Exercise per Session:    Stress:    • Feeling of Stress :    Social Connections:    • Frequency of Communication with Friends and Family:    • Frequency of Social Gatherings with Friends and Family:    • Attends [employer] Services:    • Active Member of Clubs or Organizations:    • Attends Club or Organization Meetings:    • Marital Status:    Intimate Partner Violence:    • Fear of Current or Ex-Partner:    • Emotionally Abused:    • Physically Abused:    • Sexually Abused:      Current Outpatient Medications   Medication Sig Dispense Refill   • pantoprazole (PROTONIX) 40 MG Tablet Delayed Response Take 1 tablet by mouth every day. 90 tablet 3   • SYNTHROID 112 MCG Tab Take 1 Tab by mouth Every morning on an empty stomach. 30 Tab 5   • vitamin D, Ergocalciferol, (DRISDOL) 1.25 MG (76064 UT) Cap capsule Take 1 Cap by mouth every 7 days. 12 Cap 2   • furosemide (LASIX) 20 MG Tab Take 1 Tab by mouth 1 time daily as needed (edema). 15 Tab 1   • Prenatal Multivit-Min-Fe-FA (PRENATAL/IRON PO) Take  by mouth.       No current facility-administered medications for this visit.     Family History   Problem Relation Age of Onset   • Cancer Mother         thyroid ca   • Other Father         healthy   • Stroke Other    • Diabetes Other    • Heart Disease Other    • Hypertension Other          Review of Systems   Gastrointestinal: Positive for heartburn.   All other systems reviewed and are negative.         Objective:     /72 (BP Location: Right arm, Patient Position: Sitting, BP Cuff Size: Adult)    "Pulse 70   Resp 16   Ht 1.676 m (5' 6\")   Wt 65.8 kg (145 lb)   SpO2 97%   BMI 23.40 kg/m²      Physical Exam  Vitals and nursing note reviewed.   Constitutional:       General: She is not in acute distress.     Appearance: She is well-developed. She is not diaphoretic.   HENT:      Head: Normocephalic and atraumatic.      Right Ear: External ear normal.      Left Ear: External ear normal.      Nose: Nose normal.   Eyes:      General:         Right eye: No discharge.         Left eye: No discharge.   Neck:      Thyroid: No thyromegaly.   Cardiovascular:      Rate and Rhythm: Normal rate and regular rhythm.      Heart sounds: Normal heart sounds. No murmur. No friction rub. No gallop.    Pulmonary:      Effort: Pulmonary effort is normal.      Breath sounds: Normal breath sounds. No wheezing or rales.   Musculoskeletal:         General: No tenderness.      Cervical back: Normal range of motion and neck supple.   Skin:     General: Skin is warm and dry.      Findings: No rash.   Neurological:      Mental Status: She is alert and oriented to person, place, and time.      Deep Tendon Reflexes: Reflexes normal.   Psychiatric:         Behavior: Behavior normal.         Thought Content: Thought content normal.         Judgment: Judgment normal.                 Assessment/Plan:        1. Gastroesophageal reflux disease without esophagitis  Patient not seen in over 2 years so she was advised to come in for refills on her medicine today.  I did review with her the up-to-date information regarding PPI medication in pregnancy.  I also explained that chronic heartburn increases risk for Wilkinson's esophagus but she has been to GI in the past and is only having to use the medicine once or twice per week.  I told her if it worsens we should do a new referral.    The patient was instructed in the following lifestyle and dietary changes:    A. Avoid fried, fatty, and spicy foods until better.  B. Avoid alcohol and excessive " caffeine intake.  C. Avoid citrus juices, tomato products, and pepper.  D.Eat smaller portions at mealtimes. Consider more frequent smaller meals.  E. Do not eat meals before bedtime.  F. Lose weight.  G. Quit smoking.  H. Avoid medicines like Advil, Aleve, or asperin. Chose Tylenol if possible.  I. Avoid tight clothing or belts.  J.Consider raising the head of the bed with 6 inch blocks.  - pantoprazole (PROTONIX) 40 MG Tablet Delayed Response; Take 1 tablet by mouth every day.  Dispense: 90 tablet; Refill: 3    2. Hashimoto's thyroiditis  Patient will continue to follow with endocrinology which is prescribing her levothyroxine and monitoring her levels.    3. Vitamin D deficiency  Patient on vitamin D replacement for low vitamin D levels.

## 2021-07-14 ENCOUNTER — OFFICE VISIT (OUTPATIENT)
Dept: DERMATOLOGY | Facility: IMAGING CENTER | Age: 36
End: 2021-07-14
Payer: COMMERCIAL

## 2021-07-14 DIAGNOSIS — L81.4 LENTIGINES: ICD-10-CM

## 2021-07-14 DIAGNOSIS — L72.0 EPIDERMAL INCLUSION CYST: ICD-10-CM

## 2021-07-14 DIAGNOSIS — D48.5 NEOPLASM OF UNCERTAIN BEHAVIOR OF SKIN: ICD-10-CM

## 2021-07-14 PROCEDURE — 99213 OFFICE O/P EST LOW 20 MIN: CPT | Mod: 25 | Performed by: NURSE PRACTITIONER

## 2021-07-14 PROCEDURE — 11102 TANGNTL BX SKIN SINGLE LES: CPT | Performed by: NURSE PRACTITIONER

## 2021-07-14 NOTE — PROGRESS NOTES
DERMATOLOGY NOTE  NEW VISIT       Chief complaint: Establish Care and Cyst     Would like back to be exam,     HPI/location: cyst on neck   Time present: 2 mths  Painful lesion: No  Itching lesion: No  Enlarging lesion: Yes  Anything make it better or worse?  no    History of skin cancer: No  abnormal mole removed on lt leg as a child   History of precancers/actinic keratoses: No  History of biopsies:Yes, Details: abnormal mole removed on lt leg as a child   History of blistering/severe sunburns:Yes, Details: adult, back and chest   Family history of skin cancer:No        Allergies   Allergen Reactions   • Iodine Anaphylaxis and Hives        MEDICATIONS:  Medications relevant to specialty reviewed.     REVIEW OF SYSTEMS:   Positive for skin (see HPI)  Negative for fevers and chills       EXAM:  There were no vitals taken for this visit.  Constitutional: Well-developed, well-nourished, and in no distress.     A focused skin exam was performed including the affected areas of the neck, chest, back and face around mask. Notable findings on exam today listed below and/or in assessment/plan.     9mm firm, semi-mobile cystic structure with small overlying punctum on the neck-right  -sun exposed skin of b/l shoulders with scattered clinically benign light brown reticulated macules all of which were morphologically similar and none of which were suspicious for skin cancer today on exam  One dark brown macule to upper mid back    IMPRESSION / PLAN:    1. Neoplasm of uncertain behavior of skin  Procedure Note   Procedure: Biopsy by shave technique  Location: upper mid back  Size: as noted in exam  Preoperative diagnosis:atypical nevus.   Risks, benefits and alternatives of procedure discussed and written informed consent obtained for procedure and verbal consent for photos. Time out completed. Area of biopsy prepped with alcohol. Anesthesia with 1% lidocaine with epinephrine administered with 30 gauge needle. Shave biopsy of  the site performed. Hemostasis achieved with pressure and aluminum chloride. Vaseline applied to wound with bandage. Patient tolerated procedure well and there were no complications. The specimen was sent to the pathology lab by the staff. Wound care was discussed.      2. Epidermal inclusion cyst  Given that cyst is enlarging and draining at times, Referral to plastics for excision done    3. Lentigines  - Discussed benign nature of lesions, related to sun exposure  - Discussed sun protection, hand out provided         Please note that this dictation was created using voice recognition software. I have made every reasonable attempt to correct obvious errors, but I expect that there are errors of grammar and possibly content that I did not discover before finalizing the note.      Return to clinic in: Return for pending biopsy. and as needed for any new or changing skin lesions.

## 2021-07-20 ENCOUNTER — TELEPHONE (OUTPATIENT)
Dept: DERMATOLOGY | Facility: IMAGING CENTER | Age: 36
End: 2021-07-20

## 2021-07-20 ENCOUNTER — HOSPITAL ENCOUNTER (OUTPATIENT)
Dept: RADIOLOGY | Facility: MEDICAL CENTER | Age: 36
End: 2021-07-20
Attending: NURSE PRACTITIONER
Payer: COMMERCIAL

## 2021-07-20 DIAGNOSIS — R10.2 ADNEXAL TENDERNESS, RIGHT: ICD-10-CM

## 2021-07-20 PROCEDURE — 76830 TRANSVAGINAL US NON-OB: CPT

## 2021-07-20 NOTE — TELEPHONE ENCOUNTER
Patient notified of D- path results mild dysplasia nevus. Monitor at [facility] . Scanned in media tab

## 2021-07-24 ENCOUNTER — HOSPITAL ENCOUNTER (OUTPATIENT)
Dept: LAB | Facility: MEDICAL CENTER | Age: 36
End: 2021-07-24
Attending: INTERNAL MEDICINE
Payer: COMMERCIAL

## 2021-07-24 DIAGNOSIS — E55.9 VITAMIN D DEFICIENCY: ICD-10-CM

## 2021-07-24 DIAGNOSIS — E03.9 ACQUIRED HYPOTHYROIDISM: ICD-10-CM

## 2021-07-24 DIAGNOSIS — E06.3 HASHIMOTO'S THYROIDITIS: ICD-10-CM

## 2021-07-24 LAB
25(OH)D3 SERPL-MCNC: 46 NG/ML (ref 30–100)
ALBUMIN SERPL BCP-MCNC: 4.6 G/DL (ref 3.2–4.9)
ALBUMIN/GLOB SERPL: 1.6 G/DL
ALP SERPL-CCNC: 60 U/L (ref 30–99)
ALT SERPL-CCNC: 14 U/L (ref 2–50)
ANION GAP SERPL CALC-SCNC: 10 MMOL/L (ref 7–16)
AST SERPL-CCNC: 19 U/L (ref 12–45)
BILIRUB SERPL-MCNC: 0.6 MG/DL (ref 0.1–1.5)
BUN SERPL-MCNC: 10 MG/DL (ref 8–22)
CALCIUM SERPL-MCNC: 9.6 MG/DL (ref 8.5–10.5)
CHLORIDE SERPL-SCNC: 106 MMOL/L (ref 96–112)
CO2 SERPL-SCNC: 25 MMOL/L (ref 20–33)
CREAT SERPL-MCNC: 0.73 MG/DL (ref 0.5–1.4)
FASTING STATUS PATIENT QL REPORTED: NORMAL
GLOBULIN SER CALC-MCNC: 2.9 G/DL (ref 1.9–3.5)
GLUCOSE SERPL-MCNC: 90 MG/DL (ref 65–99)
POTASSIUM SERPL-SCNC: 4.6 MMOL/L (ref 3.6–5.5)
PROT SERPL-MCNC: 7.5 G/DL (ref 6–8.2)
SODIUM SERPL-SCNC: 141 MMOL/L (ref 135–145)
T4 FREE SERPL-MCNC: 1.75 NG/DL (ref 0.93–1.7)
TSH SERPL DL<=0.005 MIU/L-ACNC: 0.32 UIU/ML (ref 0.38–5.33)

## 2021-07-24 PROCEDURE — 82306 VITAMIN D 25 HYDROXY: CPT

## 2021-07-24 PROCEDURE — 36415 COLL VENOUS BLD VENIPUNCTURE: CPT

## 2021-07-24 PROCEDURE — 80053 COMPREHEN METABOLIC PANEL: CPT

## 2021-07-24 PROCEDURE — 84443 ASSAY THYROID STIM HORMONE: CPT

## 2021-07-24 PROCEDURE — 84439 ASSAY OF FREE THYROXINE: CPT

## 2021-07-28 ENCOUNTER — OFFICE VISIT (OUTPATIENT)
Dept: ENDOCRINOLOGY | Facility: MEDICAL CENTER | Age: 36
End: 2021-07-28
Attending: INTERNAL MEDICINE
Payer: COMMERCIAL

## 2021-07-28 VITALS
SYSTOLIC BLOOD PRESSURE: 106 MMHG | DIASTOLIC BLOOD PRESSURE: 58 MMHG | HEIGHT: 65 IN | OXYGEN SATURATION: 100 % | WEIGHT: 137.8 LBS | RESPIRATION RATE: 14 BRPM | HEART RATE: 91 BPM | BODY MASS INDEX: 22.96 KG/M2

## 2021-07-28 DIAGNOSIS — E06.3 HASHIMOTO'S THYROIDITIS: ICD-10-CM

## 2021-07-28 DIAGNOSIS — E55.9 VITAMIN D DEFICIENCY: ICD-10-CM

## 2021-07-28 DIAGNOSIS — E03.9 ACQUIRED HYPOTHYROIDISM: ICD-10-CM

## 2021-07-28 DIAGNOSIS — L65.9 HAIR LOSS: ICD-10-CM

## 2021-07-28 PROCEDURE — 99214 OFFICE O/P EST MOD 30 MIN: CPT | Performed by: INTERNAL MEDICINE

## 2021-07-28 PROCEDURE — 99211 OFF/OP EST MAY X REQ PHY/QHP: CPT | Performed by: INTERNAL MEDICINE

## 2021-07-28 NOTE — PROGRESS NOTES
Chief Complaint: Follow up for Primary Hypothyroidism.     HPI:     Roxane Toth is a 35 y.o. female here for follow up of Primary Hypothyroidism.  She was previously a patient of DEYSI Arce    Since last visit patient reports feeling excellent.  She remains on Synthroid 112mcg daily which has been her dose for over 12  months.   She reports excellent compliance and denies missing any daily doses.   She takes thyroid hormone prior to breakfast.   She  denies taking any iron, calcium supplements or antacids.      Overall she feels great  Her weight is stable  She denies symptoms of thyrotoxicosis such as palpitations and tremors  She is complaining of hair loss      Her TSH was slightly low 0.320 with a free T4 1.75 on July 24, 2021  Previously her TSH was 1.09 with a free T4 1.3 on January 2021 while on the same dose      She also has a history of vitamin D deficiency and is on replacement therapy her last vitamin D was adequate at 46 on January 2021  She takes ergocalciferol 50,000 units weekly      Patient's medications, allergies, and social histories were reviewed and updated as appropriate.      ROS:     CONS:     No fever, no chills   EYES:     No diplopia, no blurry vision   CV:           No chest pain, no palpitations   PULM:     No SOB, no cough, no hemoptysis.   GI:            No nausea, no vomiting, no diarrhea, no constipation   ENDO:     No polyuria, no polydipsia, no heat intolerance, no cold intolerance       Past Medical History:  Problem List:  2020-01: Hashimoto's thyroiditis  2020-01: Vitamin D deficiency  2019-01: Tobacco abuse  2017-05: Hot flashes  2017-02: Dyspepsia  2015-05: Subclinical hypothyroidism  2015-05: Family history of thyroid cancer  2015-03: Hypothyroidism  2015-03: Migraine  2013-10: Abdominal pain, other specified site  2012-04: Health examination of defined subpopulation      Past Surgical History:  Past Surgical History:   Procedure Laterality Date   •  "GASTROSCOPY-ENDO  10/25/2013    Performed by Zohaib Felix M.D. at SURGERY [facility]   • EGD WITH ASP/BX  10/25/2013    Performed by Zohaib Felix M.D. at [facility]   • ELBOW ORIF      right elbow   • TONSILLECTOMY AND ADENOIDECTOMY  as child        Allergies:  Iodine     Social History:  Social History     Tobacco Use   • Smoking status: Former Smoker     Packs/day: 1.00     Years: 17.00     Pack years: 17.00     Types: Cigarettes     Quit date: 2020     Years since quittin.5   • Smokeless tobacco: Never Used   • Tobacco comment: one pack every other day   Vaping Use   • Vaping Use: Never used   Substance Use Topics   • Alcohol use: Yes     Alcohol/week: 0.5 oz     Types: 1 Glasses of wine per week     Comment: twice a year   • Drug use: Yes     Comment: marijuana on occasion        Family History:   family history includes Cancer in her mother; Diabetes in an other family member; Heart Disease in an other family member; Hypertension in an other family member; Other in her father; Stroke in an other family member.      PHYSICAL EXAM:   Vital signs: /58 (BP Location: Left arm, Patient Position: Sitting, BP Cuff Size: Adult)   Pulse 91   Resp 14   Ht 1.651 m (5' 5\")   Wt 62.5 kg (137 lb 12.8 oz)   SpO2 100%   BMI 22.93 kg/m²   GENERAL: Well-developed, well-nourished in no apparent distress.   EYE:  No ocular asymmetry, PERRLA  HENT: Pink, moist mucous membranes.    NECK: Thyroid is slightly enlarged and feels bosselated  CARDIOVASCULAR:  No murmurs  LUNGS: Clear breath sounds  ABDOMEN: Soft, nontender   EXTREMITIES: No clubbing, cyanosis, or edema.   NEUROLOGICAL: No gross focal motor abnormalities   LYMPH: No cervical adenopathy seen  SKIN: No rashes, lesions.       Labs:  Lab Results   Component Value Date/Time    SODIUM 141 2021 09:59 AM    POTASSIUM 4.6 2021 09:59 AM    CHLORIDE 106 2021 09:59 AM    CO2 25 2021 09:59 AM    " ANION 10.0 07/24/2021 09:59 AM    GLUCOSE 90 07/24/2021 09:59 AM    BUN 10 07/24/2021 09:59 AM    CREATININE 0.73 07/24/2021 09:59 AM    CREATININE 0.7 07/30/2007 09:55 PM    CALCIUM 9.6 07/24/2021 09:59 AM    ASTSGOT 19 07/24/2021 09:59 AM    ALTSGPT 14 07/24/2021 09:59 AM    TBILIRUBIN 0.6 07/24/2021 09:59 AM    ALBUMIN 4.6 07/24/2021 09:59 AM    TOTPROTEIN 7.5 07/24/2021 09:59 AM    GLOBULIN 2.9 07/24/2021 09:59 AM    AGRATIO 1.6 07/24/2021 09:59 AM       Lab Results   Component Value Date/Time    SODIUM 138 01/19/2019 0723    POTASSIUM 3.7 01/19/2019 0723    CHLORIDE 105 01/19/2019 0723    CO2 25 01/19/2019 0723    GLUCOSE 90 08/28/2019 0707    BUN 10 01/19/2019 0723    CREATININE 0.73 01/19/2019 0723    CALCIUM 9.8 01/19/2019 0723    ANION 8.0 01/19/2019 0723       Lab Results   Component Value Date/Time    CHOLSTRLTOT 147 08/28/2019 0707    TRIGLYCERIDE 47 08/28/2019 0707    HDL 60 08/28/2019 0707    LDL 78 08/28/2019 0707       Lab Results   Component Value Date/Time    TSHULTRASEN 0.540 01/27/2020 1730     Lab Results   Component Value Date/Time    FREET4 1.18 01/27/2020 1730     Lab Results   Component Value Date/Time    FREET3 3.40 12/09/2019 1726     No results found for: THYSTIMIG    Lab Results   Component Value Date/Time    MICROSOMALA 1556.9 (H) 05/23/2015 1043         Imaging: please refer to US from June 2017      ASSESSMENT/PLAN:     1. Acquired hypothyroidism  Well-controlled   Explained to patient that her hair loss is not from her thyroid I am going to conduct a work-up and make sure that she does not have chronic iron deficiency and hyperandrogenism  continue Synthroid 112 mcg daily  Follow-up in 6 months with repeat of TSH    2. Hashimoto's thyroiditis  This is the etiology of her hypothyroidism  As proven by her elevated TPO antibodies of 1500 from May 2015    3. Vitamin D deficiency  Controlled  Continue ergocalciferol  Repeat calcium vitamin D in 6 months    4. Hair loss  Unstable  We will  conduct work-up for chronic iron deficiency and hyperandrogenism   she will get labs this week and I will contact her on the test results and make recommendations after I review her labs      Return in about 6 months (around 1/28/2022).       Thank you kindly for allowing me to participate in the thyroid care plan for this patient.    David Schmitz MD, FACE, [facility]  01/29/20    CC:   JOSEPH Goodman

## 2021-07-30 ENCOUNTER — HOSPITAL ENCOUNTER (OUTPATIENT)
Dept: LAB | Facility: MEDICAL CENTER | Age: 36
End: 2021-07-30
Attending: INTERNAL MEDICINE
Payer: COMMERCIAL

## 2021-07-30 DIAGNOSIS — E03.9 ACQUIRED HYPOTHYROIDISM: ICD-10-CM

## 2021-07-30 DIAGNOSIS — E06.3 HASHIMOTO'S THYROIDITIS: ICD-10-CM

## 2021-07-30 DIAGNOSIS — L65.9 HAIR LOSS: ICD-10-CM

## 2021-07-30 DIAGNOSIS — E55.9 VITAMIN D DEFICIENCY: ICD-10-CM

## 2021-07-30 LAB
FERRITIN SERPL-MCNC: 40.9 NG/ML (ref 10–291)
FSH SERPL-ACNC: 6.9 MIU/ML
IRON SATN MFR SERPL: 54 % (ref 15–55)
IRON SERPL-MCNC: 128 UG/DL (ref 40–170)
LH SERPL-ACNC: 13.5 IU/L
PROLACTIN SERPL-MCNC: 9.77 NG/ML (ref 2.8–26)
TIBC SERPL-MCNC: 239 UG/DL (ref 250–450)
UIBC SERPL-MCNC: 111 UG/DL (ref 110–370)
VIT B12 SERPL-MCNC: 309 PG/ML (ref 211–911)

## 2021-07-30 PROCEDURE — 83540 ASSAY OF IRON: CPT

## 2021-07-30 PROCEDURE — 83002 ASSAY OF GONADOTROPIN (LH): CPT

## 2021-07-30 PROCEDURE — 82670 ASSAY OF TOTAL ESTRADIOL: CPT

## 2021-07-30 PROCEDURE — 84402 ASSAY OF FREE TESTOSTERONE: CPT

## 2021-07-30 PROCEDURE — 82607 VITAMIN B-12: CPT

## 2021-07-30 PROCEDURE — 83550 IRON BINDING TEST: CPT

## 2021-07-30 PROCEDURE — 84403 ASSAY OF TOTAL TESTOSTERONE: CPT

## 2021-07-30 PROCEDURE — 84270 ASSAY OF SEX HORMONE GLOBUL: CPT

## 2021-07-30 PROCEDURE — 82728 ASSAY OF FERRITIN: CPT

## 2021-07-30 PROCEDURE — 84146 ASSAY OF PROLACTIN: CPT

## 2021-07-30 PROCEDURE — 36415 COLL VENOUS BLD VENIPUNCTURE: CPT

## 2021-07-30 PROCEDURE — 83001 ASSAY OF GONADOTROPIN (FSH): CPT

## 2021-08-01 LAB — ESTRADIOL SERPL-MCNC: 70 PG/ML

## 2021-08-03 ENCOUNTER — TELEPHONE (OUTPATIENT)
Dept: OCCUPATIONAL MEDICINE | Facility: CLINIC | Age: 36
End: 2021-08-03

## 2021-08-03 DIAGNOSIS — E03.9 ACQUIRED HYPOTHYROIDISM: ICD-10-CM

## 2021-08-03 RX ORDER — LEVOTHYROXINE SODIUM 100 MCG
100 TABLET ORAL
Qty: 30 TABLET | Refills: 5 | Status: SHIPPED | OUTPATIENT
Start: 2021-08-03 | End: 2022-01-28

## 2021-08-04 ENCOUNTER — TELEPHONE (OUTPATIENT)
Dept: DERMATOLOGY | Facility: IMAGING CENTER | Age: 36
End: 2021-08-04

## 2021-08-04 LAB
SHBG SERPL-SCNC: 66 NMOL/L (ref 30–135)
TESTOST FREE SERPL-MCNC: 3.4 PG/ML (ref 1.3–9.2)
TESTOST SERPL-MCNC: 32 NG/DL (ref 9–55)

## 2021-09-21 ENCOUNTER — IMMUNIZATION (OUTPATIENT)
Dept: OCCUPATIONAL MEDICINE | Facility: CLINIC | Age: 36
End: 2021-09-21

## 2021-09-21 DIAGNOSIS — Z23 NEED FOR VACCINATION: Primary | ICD-10-CM

## 2021-09-21 PROCEDURE — 90686 IIV4 VACC NO PRSV 0.5 ML IM: CPT | Performed by: PREVENTIVE MEDICINE

## 2022-01-12 ENCOUNTER — OFFICE VISIT (OUTPATIENT)
Dept: MEDICAL GROUP | Facility: PHYSICIAN GROUP | Age: 37
End: 2022-01-12
Payer: COMMERCIAL

## 2022-01-12 VITALS
TEMPERATURE: 98.2 F | BODY MASS INDEX: 21.53 KG/M2 | HEART RATE: 90 BPM | OXYGEN SATURATION: 95 % | SYSTOLIC BLOOD PRESSURE: 106 MMHG | HEIGHT: 66 IN | DIASTOLIC BLOOD PRESSURE: 60 MMHG | WEIGHT: 134 LBS

## 2022-01-12 DIAGNOSIS — E03.9 ACQUIRED HYPOTHYROIDISM: ICD-10-CM

## 2022-01-12 DIAGNOSIS — E55.9 VITAMIN D DEFICIENCY: ICD-10-CM

## 2022-01-12 DIAGNOSIS — K21.9 GASTROESOPHAGEAL REFLUX DISEASE, UNSPECIFIED WHETHER ESOPHAGITIS PRESENT: ICD-10-CM

## 2022-01-12 DIAGNOSIS — L65.9 FALLING HAIR: ICD-10-CM

## 2022-01-12 DIAGNOSIS — F41.1 GAD (GENERALIZED ANXIETY DISORDER): ICD-10-CM

## 2022-01-12 DIAGNOSIS — E53.8 B12 DEFICIENCY: ICD-10-CM

## 2022-01-12 DIAGNOSIS — K90.41 NON-CELIAC GLUTEN SENSITIVITY: ICD-10-CM

## 2022-01-12 PROCEDURE — 99204 OFFICE O/P NEW MOD 45 MIN: CPT | Performed by: INTERNAL MEDICINE

## 2022-01-12 RX ORDER — AMOXICILLIN 500 MG/1
CAPSULE ORAL
COMMUNITY
Start: 2021-10-18 | End: 2022-01-12

## 2022-01-12 RX ORDER — FERROUS SULFATE 325(65) MG
TABLET ORAL
Qty: 90 TABLET | Refills: 1 | Status: SHIPPED | OUTPATIENT
Start: 2022-01-12 | End: 2023-02-07

## 2022-01-12 RX ORDER — CHLORHEXIDINE GLUCONATE ORAL RINSE 1.2 MG/ML
SOLUTION DENTAL
COMMUNITY
Start: 2021-10-18 | End: 2022-01-12

## 2022-01-12 ASSESSMENT — PATIENT HEALTH QUESTIONNAIRE - PHQ9
CLINICAL INTERPRETATION OF PHQ2 SCORE: 1
5. POOR APPETITE OR OVEREATING: 0 - NOT AT ALL
SUM OF ALL RESPONSES TO PHQ QUESTIONS 1-9: 5

## 2022-01-12 NOTE — PROGRESS NOTES
New Patient to establish    Chief Complaint   Patient presents with   • Establish Care       Subjective:     History of Present Illness: Patient is a 37 y.o. female who is here today to establish primary care    Current Outpatient Medications on File Prior to Visit   Medication Sig Dispense Refill   • SYNTHROID 100 MCG Tab Take 1 tablet by mouth every morning on an empty stomach. 30 tablet 5   • pantoprazole (PROTONIX) 40 MG Tablet Delayed Response Take 1 tablet by mouth every day. 90 tablet 3   • vitamin D, Ergocalciferol, (DRISDOL) 1.25 MG (43174 UT) Cap capsule Take 1 Cap by mouth every 7 days. 12 Cap 2   • Prenatal Multivit-Min-Fe-FA (PRENATAL/IRON PO) Take  by mouth.       No current facility-administered medications on file prior to visit.     Allergies   Allergen Reactions   • Iodine Anaphylaxis and Hives     Patient Active Problem List    Diagnosis Date Noted   • RUBY (generalized anxiety disorder) 01/12/2022   • GERD (gastroesophageal reflux disease) 01/12/2022   • B12 deficiency 01/12/2022   • Hashimoto's thyroiditis 01/29/2020   • Vitamin D deficiency 01/29/2020   • Hot flashes 05/02/2017   • Family history of thyroid cancer 05/21/2015   • Hypothyroidism 03/30/2015   • Migraine 03/30/2015     Past Medical History:   Diagnosis Date   • Heart burn    • Indigestion    • Renal disorder 1/2011    kidney infection   • Thyroid disease      Past Surgical History:   Procedure Laterality Date   • GASTROSCOPY-ENDO  10/25/2013    Performed by Zohaib Felix M.D. at [facility]   • EGD WITH ASP/BX  10/25/2013    Performed by Zohaib Felix M.D. at [facility]   • ORIF, ELBOW  1991    right elbow   • TONSILLECTOMY AND ADENOIDECTOMY  as child     Family History   Problem Relation Age of Onset   • Cancer Mother         thyroid ca   • Other Father         healthy   • Stroke Other    • Diabetes Other    • Heart Disease Other    • Hypertension Other      Social History     Tobacco  "Use   • Smoking status: Former Smoker     Packs/day: 1.00     Years: 17.00     Pack years: 17.00     Types: Cigarettes     Quit date: 2020     Years since quittin.0   • Smokeless tobacco: Never Used   • Tobacco comment: one pack every other day   Vaping Use   • Vaping Use: Never used   Substance Use Topics   • Alcohol use: Yes     Alcohol/week: 0.5 oz     Types: 1 Glasses of wine per week     Comment: twice a year   • Drug use: Yes     Comment: marijuana on occasion       ROS:  All other systems reviewed and are negative except as stated in the HPI       Physical Exam:     /60 (BP Location: Left arm, Patient Position: Sitting, BP Cuff Size: Adult)   Pulse 90   Temp 36.8 °C (98.2 °F) (Temporal)   Ht 1.664 m (5' 5.5\")   Wt 60.8 kg (134 lb)   SpO2 95%   BMI 21.96 kg/m²   General: Normal appearing. No distress.  Pulmonary: Clear to ausculation.  Normal effort.   Cardiovascular: Regular rate and rhythm    Assessment and Plan:     1. Acquired hypothyroidism  2. Vitamin D deficiency  Falling hair  - ferrous sulfate 325 (65 Fe) MG tablet; Take on Monday, Wednesday and Friday.  Dispense: 90 Tablet; Refill: 1  3. RUBY (generalized anxiety disorder)  - Referral to Behavioral Health    4. Gastroesophageal reflux disease, unspecified whether esophagitis present  - H.PYLORI STOOL ANTIGEN; Future  5. B12 deficiency    > She has lab with evidence of borderline B12 deficiency, low ferritin level, she is currently following up with Dr. Pérez endocrinology for further work-up of hair fall    > Reported also anxiety and mood swinging especially around menstrual cycle, she would like to be seen by counselor for that as well    None celiac gluten sensitivity   > reported GI symptoms with abdominal bloating after wheat and gluten, also reported some acid reflux, she takes pantoprazole maybe twice a month for that  > also advised to do food allergy test    Follow Up:      Return in about 6 weeks (around " 2/23/2022).    Please note that this dictation was created using voice recognition software. I have made every reasonable attempt to correct obvious errors, but I expect that there are errors of grammar and possibly content that I did not discover before finalizing the note.    Signed by: Timo Greenwood M.D.

## 2022-01-26 DIAGNOSIS — E55.9 VITAMIN D DEFICIENCY: ICD-10-CM

## 2022-01-28 ENCOUNTER — HOSPITAL ENCOUNTER (OUTPATIENT)
Dept: LAB | Facility: MEDICAL CENTER | Age: 37
End: 2022-01-28
Attending: INTERNAL MEDICINE
Payer: COMMERCIAL

## 2022-01-28 DIAGNOSIS — L65.9 HAIR LOSS: ICD-10-CM

## 2022-01-28 DIAGNOSIS — E03.9 ACQUIRED HYPOTHYROIDISM: ICD-10-CM

## 2022-01-28 DIAGNOSIS — E55.9 VITAMIN D DEFICIENCY: ICD-10-CM

## 2022-01-28 DIAGNOSIS — E06.3 HASHIMOTO'S THYROIDITIS: ICD-10-CM

## 2022-01-28 LAB
25(OH)D3 SERPL-MCNC: 43 NG/ML (ref 30–100)
ALBUMIN SERPL BCP-MCNC: 4.5 G/DL (ref 3.2–4.9)
ALBUMIN/GLOB SERPL: 1.6 G/DL
ALP SERPL-CCNC: 57 U/L (ref 30–99)
ALT SERPL-CCNC: 15 U/L (ref 2–50)
ANION GAP SERPL CALC-SCNC: 10 MMOL/L (ref 7–16)
AST SERPL-CCNC: 19 U/L (ref 12–45)
BILIRUB SERPL-MCNC: 0.4 MG/DL (ref 0.1–1.5)
BUN SERPL-MCNC: 11 MG/DL (ref 8–22)
CALCIUM SERPL-MCNC: 9.3 MG/DL (ref 8.5–10.5)
CHLORIDE SERPL-SCNC: 106 MMOL/L (ref 96–112)
CO2 SERPL-SCNC: 23 MMOL/L (ref 20–33)
CREAT SERPL-MCNC: 0.67 MG/DL (ref 0.5–1.4)
FERRITIN SERPL-MCNC: 44.2 NG/ML (ref 10–291)
GLOBULIN SER CALC-MCNC: 2.9 G/DL (ref 1.9–3.5)
GLUCOSE SERPL-MCNC: 89 MG/DL (ref 65–99)
IRON SERPL-MCNC: 90 UG/DL (ref 40–170)
POTASSIUM SERPL-SCNC: 4.4 MMOL/L (ref 3.6–5.5)
PROT SERPL-MCNC: 7.4 G/DL (ref 6–8.2)
SODIUM SERPL-SCNC: 139 MMOL/L (ref 135–145)
T4 FREE SERPL-MCNC: 1.4 NG/DL (ref 0.93–1.7)
TSH SERPL DL<=0.005 MIU/L-ACNC: 0.65 UIU/ML (ref 0.38–5.33)

## 2022-01-28 PROCEDURE — 83540 ASSAY OF IRON: CPT

## 2022-01-28 PROCEDURE — 36415 COLL VENOUS BLD VENIPUNCTURE: CPT

## 2022-01-28 PROCEDURE — 84439 ASSAY OF FREE THYROXINE: CPT

## 2022-01-28 PROCEDURE — 82306 VITAMIN D 25 HYDROXY: CPT

## 2022-01-28 PROCEDURE — 82728 ASSAY OF FERRITIN: CPT

## 2022-01-28 PROCEDURE — 80053 COMPREHEN METABOLIC PANEL: CPT

## 2022-01-28 PROCEDURE — 84443 ASSAY THYROID STIM HORMONE: CPT

## 2022-01-28 RX ORDER — ERGOCALCIFEROL 1.25 MG/1
CAPSULE ORAL
Qty: 12 CAPSULE | Refills: 2 | Status: SHIPPED | OUTPATIENT
Start: 2022-01-28 | End: 2022-10-10

## 2022-01-28 RX ORDER — LEVOTHYROXINE SODIUM 100 MCG
TABLET ORAL
Qty: 30 TABLET | Refills: 5 | Status: SHIPPED | OUTPATIENT
Start: 2022-01-28 | End: 2022-02-02

## 2022-02-02 ENCOUNTER — OFFICE VISIT (OUTPATIENT)
Dept: ENDOCRINOLOGY | Facility: MEDICAL CENTER | Age: 37
End: 2022-02-02
Attending: INTERNAL MEDICINE
Payer: COMMERCIAL

## 2022-02-02 VITALS
HEIGHT: 66 IN | SYSTOLIC BLOOD PRESSURE: 116 MMHG | HEART RATE: 74 BPM | WEIGHT: 139.7 LBS | DIASTOLIC BLOOD PRESSURE: 58 MMHG | BODY MASS INDEX: 22.45 KG/M2 | OXYGEN SATURATION: 99 %

## 2022-02-02 DIAGNOSIS — E55.9 VITAMIN D DEFICIENCY: ICD-10-CM

## 2022-02-02 DIAGNOSIS — E03.9 ACQUIRED HYPOTHYROIDISM: ICD-10-CM

## 2022-02-02 DIAGNOSIS — E53.8 B12 DEFICIENCY: ICD-10-CM

## 2022-02-02 DIAGNOSIS — L65.9 HAIR LOSS: ICD-10-CM

## 2022-02-02 DIAGNOSIS — E06.3 HASHIMOTO'S THYROIDITIS: ICD-10-CM

## 2022-02-02 PROCEDURE — 99211 OFF/OP EST MAY X REQ PHY/QHP: CPT | Performed by: INTERNAL MEDICINE

## 2022-02-02 PROCEDURE — 99214 OFFICE O/P EST MOD 30 MIN: CPT | Performed by: INTERNAL MEDICINE

## 2022-02-02 RX ORDER — LEVOTHYROXINE SODIUM 88 MCG
88 TABLET ORAL
Qty: 30 TABLET | Refills: 6 | Status: SHIPPED | OUTPATIENT
Start: 2022-02-02 | End: 2022-09-19

## 2022-02-14 ENCOUNTER — HOSPITAL ENCOUNTER (OUTPATIENT)
Facility: MEDICAL CENTER | Age: 37
End: 2022-02-14
Attending: PHYSICIAN ASSISTANT
Payer: COMMERCIAL

## 2022-02-14 ENCOUNTER — OFFICE VISIT (OUTPATIENT)
Dept: URGENT CARE | Facility: CLINIC | Age: 37
End: 2022-02-14
Payer: COMMERCIAL

## 2022-02-14 VITALS
SYSTOLIC BLOOD PRESSURE: 104 MMHG | WEIGHT: 139 LBS | TEMPERATURE: 97.5 F | BODY MASS INDEX: 22.34 KG/M2 | HEART RATE: 86 BPM | RESPIRATION RATE: 14 BRPM | OXYGEN SATURATION: 96 % | DIASTOLIC BLOOD PRESSURE: 56 MMHG | HEIGHT: 66 IN

## 2022-02-14 DIAGNOSIS — R05.9 COUGH: ICD-10-CM

## 2022-02-14 DIAGNOSIS — J06.9 UPPER RESPIRATORY TRACT INFECTION, UNSPECIFIED TYPE: ICD-10-CM

## 2022-02-14 LAB
COVID ORDER STATUS COVID19: NORMAL
SARS-COV-2 RNA RESP QL NAA+PROBE: NOTDETECTED
SPECIMEN SOURCE: NORMAL

## 2022-02-14 PROCEDURE — U0003 INFECTIOUS AGENT DETECTION BY NUCLEIC ACID (DNA OR RNA); SEVERE ACUTE RESPIRATORY SYNDROME CORONAVIRUS 2 (SARS-COV-2) (CORONAVIRUS DISEASE [COVID-19]), AMPLIFIED PROBE TECHNIQUE, MAKING USE OF HIGH THROUGHPUT TECHNOLOGIES AS DESCRIBED BY CMS-2020-01-R: HCPCS

## 2022-02-14 PROCEDURE — 99213 OFFICE O/P EST LOW 20 MIN: CPT | Performed by: PHYSICIAN ASSISTANT

## 2022-02-14 PROCEDURE — U0005 INFEC AGEN DETEC AMPLI PROBE: HCPCS

## 2022-02-14 ASSESSMENT — ENCOUNTER SYMPTOMS
FEVER: 0
MYALGIAS: 0
WHEEZING: 0
CHILLS: 0
DIARRHEA: 0
SORE THROAT: 1
EYE REDNESS: 0
HEADACHES: 1
DIZZINESS: 0
NECK PAIN: 0
EYE DISCHARGE: 0
COUGH: 1
VOMITING: 0

## 2022-02-14 NOTE — PROGRESS NOTES
"Subjective     Roxane Toth is a 37 y.o. female who presents with Cough (x4 days with congestion, headache, lung pain, sore throat.  Denies fever or chills.  Vaccinated for Covid with Booster. )            Patient is a 37-year-old female who presents to urgent care with cough, congestion, headache with sore throat for the last 3 to 4 days.  Patient denies any fevers, chills.  Patient does report chest discomfort after coughing in particular.  Patient has been taking NyQuil at night of which has been assisting with nighttime coughing.  Denies any ill exposures to COVID-19 that she is aware of.  Patient is vaccinated x3 at this time.    Cough  This is a new problem. The current episode started in the past 7 days. The problem has been unchanged. The problem occurs every few minutes. The cough is non-productive. Associated symptoms include headaches, nasal congestion and a sore throat. Pertinent negatives include no chills, ear pain, eye redness, fever, myalgias, rash or wheezing. The symptoms are aggravated by lying down. Treatments tried: As above.        Review of Systems   Constitutional: Positive for malaise/fatigue. Negative for chills and fever.   HENT: Positive for congestion and sore throat. Negative for ear discharge and ear pain.    Eyes: Negative for discharge and redness.   Respiratory: Positive for cough. Negative for wheezing.    Gastrointestinal: Negative for diarrhea and vomiting.   Genitourinary: Negative for dysuria and urgency.   Musculoskeletal: Negative for myalgias and neck pain.   Skin: Negative for itching and rash.   Neurological: Positive for headaches. Negative for dizziness.              Objective     /56   Pulse 86   Temp 36.4 °C (97.5 °F) (Temporal)   Resp 14   Ht 1.676 m (5' 6\")   Wt 63 kg (139 lb)   LMP 01/31/2022   SpO2 96%   BMI 22.44 kg/m²    PMH:  has a past medical history of Heart burn, Indigestion, Renal disorder (1/2011), and Thyroid disease. She also has " no past medical history of Allergy or Anemia.  MEDS: Reviewed .   ALLERGIES:   Allergies   Allergen Reactions   • Iodine Anaphylaxis and Hives     SURGHX:   Past Surgical History:   Procedure Laterality Date   • GASTROSCOPY-ENDO  10/25/2013    Performed by Zohaib Felix M.D. at SURGERY [facility]   • EGD WITH ASP/BX  10/25/2013    Performed by Zohaib Felix M.D. at SURGERY [facility]   • ORIF, ELBOW  1991    right elbow   • TONSILLECTOMY AND ADENOIDECTOMY  as child     SOCHX:  reports that she quit smoking about 2 years ago. Her smoking use included cigarettes. She has a 17.00 pack-year smoking history. She has never used smokeless tobacco. She reports current alcohol use of about 0.5 oz of alcohol per week. She reports previous drug use.  FH: Family history was reviewed, no pertinent findings to report    Physical Exam  Vitals reviewed.   Constitutional:       Appearance: Normal appearance. She is well-developed.   HENT:      Head: Normocephalic and atraumatic.      Ears:      Comments: Bilateral clear middle ear effusions.     Nose:      Comments: Rhinorrhea noted.     Mouth/Throat:      Comments: Posterior oropharynx is erythematous, positive postnasal drainage.  No evidence of exudate.  Eyes:      Conjunctiva/sclera: Conjunctivae normal.      Pupils: Pupils are equal, round, and reactive to light.   Cardiovascular:      Rate and Rhythm: Normal rate and regular rhythm.      Heart sounds: No murmur heard.      Pulmonary:      Effort: Pulmonary effort is normal. No respiratory distress.      Breath sounds: Normal breath sounds.   Musculoskeletal:         General: Normal range of motion.      Cervical back: Normal range of motion and neck supple.      Right lower leg: No edema.      Left lower leg: No edema.   Lymphadenopathy:      Cervical: No cervical adenopathy.   Skin:     General: Skin is warm.      Findings: No rash.   Neurological:      Mental Status: She is alert and oriented to  person, place, and time.   Psychiatric:         Mood and Affect: Mood normal.         Behavior: Behavior normal.         Thought Content: Thought content normal.                             Assessment & Plan        1. Upper respiratory tract infection, unspecified type  - COVID/SARS CoV-2 PCR; Future    2. Cough  - COVID/SARS CoV-2 PCR; Future            Appropriate PPE worn at all times by provider.   Pt. Had face mask on throughout entirety of the visit other than oropharyngeal examination today.     Testing performed for COVID-19.    Patient currently without indication of need for higher level of care.    Reviewed with patient/guardian that if they do test positive they will be contacted by their local health department regarding return to work/school protocols.  Results will also be released to patient/guardian in MyChart or called to the patient/guardian directly. Discussed isolation/quarantine recommendations.  Encouraged mask use, frequent handwashing, wiping down hard surfaces, etc.    Patient and/or guardian given precautionary s/sx that mandate immediate follow up and evaluation in the ED. Advised of risks of not doing so.  Side effects of OTC or prescribed medications discussed.   DDX, Supportive care, and indications for immediate follow-up discussed.  Instructed to return to clinic or nearest emergency department if we are not available for any change in condition, further concerns, or worsening of symptoms.    The patient and/or guardian demonstrated a good understanding and agreed with the treatment plan.    Please note that this dictation was created using voice recognition software. I have made every reasonable attempt to correct obvious errors, but I expect that there are errors of grammar and possibly content that I did not discover before finalizing the note.

## 2022-03-15 ENCOUNTER — OFFICE VISIT (OUTPATIENT)
Dept: URGENT CARE | Facility: PHYSICIAN GROUP | Age: 37
End: 2022-03-15
Payer: COMMERCIAL

## 2022-03-15 VITALS
WEIGHT: 135.8 LBS | TEMPERATURE: 98.1 F | OXYGEN SATURATION: 97 % | SYSTOLIC BLOOD PRESSURE: 102 MMHG | HEIGHT: 66 IN | DIASTOLIC BLOOD PRESSURE: 60 MMHG | BODY MASS INDEX: 21.83 KG/M2 | RESPIRATION RATE: 16 BRPM | HEART RATE: 73 BPM

## 2022-03-15 DIAGNOSIS — R11.0 NAUSEA: ICD-10-CM

## 2022-03-15 DIAGNOSIS — H81.10 BENIGN PAROXYSMAL POSITIONAL VERTIGO, UNSPECIFIED LATERALITY: ICD-10-CM

## 2022-03-15 PROCEDURE — 99214 OFFICE O/P EST MOD 30 MIN: CPT | Performed by: FAMILY MEDICINE

## 2022-03-15 RX ORDER — ONDANSETRON 4 MG/1
4 TABLET, FILM COATED ORAL EVERY 4 HOURS PRN
Qty: 10 TABLET | Refills: 0 | Status: SHIPPED | OUTPATIENT
Start: 2022-03-15 | End: 2022-03-18

## 2022-03-15 RX ORDER — ONDANSETRON 4 MG/1
8 TABLET, ORALLY DISINTEGRATING ORAL ONCE
Status: COMPLETED | OUTPATIENT
Start: 2022-03-15 | End: 2022-03-15

## 2022-03-15 RX ORDER — MECLIZINE HYDROCHLORIDE 25 MG/1
25 TABLET ORAL 3 TIMES DAILY PRN
Qty: 20 TABLET | Refills: 0 | Status: SHIPPED | OUTPATIENT
Start: 2022-03-15 | End: 2022-03-16

## 2022-03-15 RX ADMIN — ONDANSETRON 8 MG: 4 TABLET, ORALLY DISINTEGRATING ORAL at 12:43

## 2022-03-15 ASSESSMENT — ENCOUNTER SYMPTOMS
DIZZINESS: 1
FEVER: 0
MYALGIAS: 0
COUGH: 0
NAUSEA: 1
VOMITING: 0
SORE THROAT: 0
CHILLS: 0
SHORTNESS OF BREATH: 0

## 2022-03-15 NOTE — LETTER
March 15, 2022         Patient: Roxane Toth   YOB: 1985   Date of Visit: 3/15/2022           To Whom it May Concern:    Roxane Toth was seen in my clinic on 3/15/2022. Excuse 3/15/2022, 3/16/2022. May return to work on 3/17/2022.    If you have any questions or concerns, please don't hesitate to call.        Sincerely,           Kirk Yates M.D.  Electronically Signed

## 2022-03-15 NOTE — PATIENT INSTRUCTIONS
Benign Positional Vertigo  Vertigo is the feeling that you or your surroundings are moving when they are not. Benign positional vertigo is the most common form of vertigo. This is usually a harmless condition (benign). This condition is positional. This means that symptoms are triggered by certain movements and positions.  This condition can be dangerous if it occurs while you are doing something that could cause harm to you or others. This includes activities such as driving or operating machinery.  What are the causes?  In many cases, the cause of this condition is not known. It may be caused by a disturbance in an area of the inner ear that helps your brain to sense movement and balance. This disturbance can be caused by:  · Viral infection (labyrinthitis).  · Head injury.  · Repetitive motion, such as jumping, dancing, or running.  What increases the risk?  You are more likely to develop this condition if:  · You are a woman.  · You are 50 years of age or older.  What are the signs or symptoms?  Symptoms of this condition usually happen when you move your head or your eyes in different directions. Symptoms may start suddenly, and usually last for less than a minute. They include:  · Loss of balance and falling.  · Feeling like you are spinning or moving.  · Feeling like your surroundings are spinning or moving.  · Nausea and vomiting.  · Blurred vision.  · Dizziness.  · Involuntary eye movement (nystagmus).  Symptoms can be mild and cause only minor problems, or they can be severe and interfere with daily life. Episodes of benign positional vertigo may return (recur) over time. Symptoms may improve over time.  How is this diagnosed?  This condition may be diagnosed based on:  · Your medical history.  · Physical exam of the head, neck, and ears.  · Tests, such as:  ? MRI.  ? CT scan.  ? Eye movement tests. Your health care provider may ask you to change positions quickly while he or she watches you for symptoms  of benign positional vertigo, such as nystagmus. Eye movement may be tested with a variety of exams that are designed to evaluate or stimulate vertigo.  ? An electroencephalogram (EEG). This records electrical activity in your brain.  ? Hearing tests.  You may be referred to a health care provider who specializes in ear, nose, and throat (ENT) problems (otolaryngologist) or a provider who specializes in disorders of the nervous system (neurologist).  How is this treated?    This condition may be treated in a session in which your health care provider moves your head in specific positions to adjust your inner ear back to normal. Treatment for this condition may take several sessions. Surgery may be needed in severe cases, but this is rare.   In some cases, benign positional vertigo may resolve on its own in 2-4 weeks.  Follow these instructions at home:  Safety  · Move slowly. Avoid sudden body or head movements or certain positions, as told by your health care provider.  · Avoid driving until your health care provider says it is safe for you to do so.  · Avoid operating heavy machinery until your health care provider says it is safe for you to do so.  · Avoid doing any tasks that would be dangerous to you or others if vertigo occurs.  · If you have trouble walking or keeping your balance, try using a cane for stability. If you feel dizzy or unstable, sit down right away.  · Return to your normal activities as told by your health care provider. Ask your health care provider what activities are safe for you.  General instructions  · Take over-the-counter and prescription medicines only as told by your health care provider.  · Drink enough fluid to keep your urine pale yellow.  · Keep all follow-up visits as told by your health care provider. This is important.  Contact a health care provider if:  · You have a fever.  · Your condition gets worse or you develop new symptoms.  · Your family or friends notice any  "behavioral changes.  · You have nausea or vomiting that gets worse.  · You have numbness or a \"pins and needles\" sensation.  Get help right away if you:  · Have difficulty speaking or moving.  · Are always dizzy.  · Faint.  · Develop severe headaches.  · Have weakness in your legs or arms.  · Have changes in your hearing or vision.  · Develop a stiff neck.  · Develop sensitivity to light.  Summary  · Vertigo is the feeling that you or your surroundings are moving when they are not. Benign positional vertigo is the most common form of vertigo.  · The cause of this condition is not known. It may be caused by a disturbance in an area of the inner ear that helps your brain to sense movement and balance.  · Symptoms include loss of balance and falling, feeling that you or your surroundings are moving, nausea and vomiting, and blurred vision.  · This condition can be diagnosed based on symptoms, physical exam, and other tests, such as MRI, CT scan, eye movement tests, and hearing tests.  · Follow safety instructions as told by your health care provider. You will also be told when to contact your health care provider in case of problems.  This information is not intended to replace advice given to you by your health care provider. Make sure you discuss any questions you have with your health care provider.  Document Released: 09/25/2007 Document Revised: 05/29/2019 Document Reviewed: 05/29/2019  Elsevier Patient Education © 2020 [employer]    "

## 2022-03-15 NOTE — PROGRESS NOTES
Subjective:   Roxane Toth is a 37 y.o. female who presents for Dizziness (Getting dizziness when moving, nausea, slight headache, onset today at 3am)        Dizziness  This is a new (Reports onset of vertigo morning at 3 AM when awakening to go to the gym, noted vertigo and room spinning which resolved with remaining in a stationary position) problem. The problem occurs intermittently. The problem has been unchanged. Associated symptoms include nausea. Pertinent negatives include no chills, coughing, fever, myalgias, rash, sore throat or vomiting. Exacerbated by: Position change, movement. She has tried rest for the symptoms. The treatment provided no relief.     PMH:  has a past medical history of Heart burn, Indigestion, Renal disorder (1/2011), and Thyroid disease.    She has no past medical history of Allergy or Anemia.  MEDS:   Current Outpatient Medications:   •  meclizine (ANTIVERT) 25 MG Tab, Take 1 Tablet by mouth 3 times a day as needed., Disp: 20 Tablet, Rfl: 0  •  ondansetron (ZOFRAN) 4 MG Tab tablet, Take 1 Tablet by mouth every four hours as needed for Nausea/Vomiting for up to 3 days., Disp: 10 Tablet, Rfl: 0  •  Cyanocobalamin (B-12 PO), Take  by mouth., Disp: , Rfl:   •  SYNTHROID 88 MCG Tab, Take 1 Tablet by mouth every morning on an empty stomach., Disp: 30 Tablet, Rfl: 6  •  vitamin D2, Ergocalciferol, (DRISDOL) 1.25 MG (24785 UT) Cap capsule, TAKE 1 CAPSULE BY MOUTH EVERY 7 DAYS, Disp: 12 Capsule, Rfl: 2  •  ferrous sulfate 325 (65 Fe) MG tablet, Take on Monday, Wednesday and Friday., Disp: 90 Tablet, Rfl: 1  •  pantoprazole (PROTONIX) 40 MG Tablet Delayed Response, Take 1 tablet by mouth every day., Disp: 90 tablet, Rfl: 3  ALLERGIES:   Allergies   Allergen Reactions   • Iodine Anaphylaxis and Hives     SURGHX:   Past Surgical History:   Procedure Laterality Date   • GASTROSCOPY-ENDO  10/25/2013    Performed by Zohaib Felix M.D. at [facility] ORS   • EGD WITH  "ASP/BX  10/25/2013    Performed by Zohaib Felix M.D. at SURGERY [facility] ORS   • ORIF, ELBOW  1991    right elbow   • TONSILLECTOMY AND ADENOIDECTOMY  as child     SOCHX:  reports that she quit smoking about 2 years ago. Her smoking use included cigarettes. She has a 17.00 pack-year smoking history. She has never used smokeless tobacco. She reports current alcohol use of about 0.5 oz of alcohol per week. She reports previous drug use.  FH:   Family History   Problem Relation Age of Onset   • Cancer Mother         thyroid ca   • Other Father         healthy   • Stroke Other    • Diabetes Other    • Heart Disease Other    • Hypertension Other      Review of Systems   Constitutional: Negative for chills and fever.   HENT: Negative for sore throat.    Respiratory: Negative for cough and shortness of breath.    Gastrointestinal: Positive for nausea. Negative for vomiting.   Musculoskeletal: Negative for myalgias.   Skin: Negative for rash.   Neurological: Positive for dizziness.        Objective:   /60 (BP Location: Right arm, Patient Position: Sitting, BP Cuff Size: Adult)   Pulse 73   Temp 36.7 °C (98.1 °F) (Temporal)   Resp 16   Ht 1.676 m (5' 6\")   Wt 61.6 kg (135 lb 12.8 oz)   SpO2 97%   BMI 21.92 kg/m²   Physical Exam  Vitals and nursing note reviewed.   Constitutional:       General: She is not in acute distress.     Appearance: She is well-developed.   HENT:      Head: Normocephalic and atraumatic.      Right Ear: External ear normal.      Left Ear: External ear normal.      Nose: Nose normal.      Mouth/Throat:      Mouth: Mucous membranes are moist.   Eyes:      Extraocular Movements: Extraocular movements intact.      Conjunctiva/sclera: Conjunctivae normal.      Pupils: Pupils are equal, round, and reactive to light.   Cardiovascular:      Rate and Rhythm: Normal rate.   Pulmonary:      Effort: Pulmonary effort is normal. No respiratory distress.      Breath sounds: Normal breath " sounds.   Abdominal:      General: There is no distension.   Musculoskeletal:         General: Normal range of motion.   Skin:     General: Skin is warm and dry.   Neurological:      General: No focal deficit present.      Mental Status: She is alert and oriented to person, place, and time. Mental status is at baseline.      Sensory: Sensation is intact.      Motor: Motor function is intact.      Coordination: Coordination is intact. Romberg sign negative. Coordination normal.      Gait: Gait (gait at baseline) normal.      Comments: Symptoms reproduced with Tariq-Hallpike maneuver   Psychiatric:         Judgment: Judgment normal.           Assessment/Plan:   1. Benign paroxysmal positional vertigo, unspecified laterality  - meclizine (ANTIVERT) 25 MG Tab; Take 1 Tablet by mouth 3 times a day as needed.  Dispense: 20 Tablet; Refill: 0    2. Nausea  - ondansetron (ZOFRAN ODT) dispertab 8 mg  - ondansetron (ZOFRAN) 4 MG Tab tablet; Take 1 Tablet by mouth every four hours as needed for Nausea/Vomiting for up to 3 days.  Dispense: 10 Tablet; Refill: 0        Medical Decision Making/Course:  In the course of preparing for this visit with review of the pertinent past medical history, recent and past clinic visits, current medications, and performing chart, immunization, medical history and medication reconciliation, and in the further course of obtaining the current history pertinent to the clinic visit today, performing an exam and evaluation, ordering and independently evaluating labs, tests  , and/or procedures, prescribing any recommended new medications as noted above including administration of ondansetron 8 mg orally once during urgent care course, providing any pertinent counseling and education and recommending further coordination of care including symptomatic and supportive care measures and benign positional vertigo half somersault maneuver exercises including drafting a work excuse letter, at least  20 minutes  of total time were spent during this encounter.      Discussed close monitoring, return precautions, and supportive measures of maintaining adequate fluid hydration and caloric intake, relative rest and symptom management as needed for pain and/or fever.    Differential diagnosis, natural history, supportive care, and indications for immediate follow-up discussed.     Advised the patient to follow-up with the primary care physician for recheck, reevaluation, and consideration of further management.    Please note that this dictation was created using voice recognition software. I have worked with consultants from the vendor as well as technical experts from [employer] to optimize the interface. I have made every reasonable attempt to correct obvious errors, but I expect that there are errors of grammar and possibly content that I did not discover before finalizing the note.

## 2022-03-16 ENCOUNTER — OFFICE VISIT (OUTPATIENT)
Dept: MEDICAL GROUP | Facility: PHYSICIAN GROUP | Age: 37
End: 2022-03-16
Payer: COMMERCIAL

## 2022-03-16 VITALS
OXYGEN SATURATION: 98 % | HEART RATE: 66 BPM | DIASTOLIC BLOOD PRESSURE: 64 MMHG | TEMPERATURE: 97 F | HEIGHT: 66 IN | WEIGHT: 138.3 LBS | BODY MASS INDEX: 22.23 KG/M2 | SYSTOLIC BLOOD PRESSURE: 106 MMHG

## 2022-03-16 DIAGNOSIS — E55.9 VITAMIN D DEFICIENCY: ICD-10-CM

## 2022-03-16 DIAGNOSIS — E03.8 HYPOTHYROIDISM DUE TO HASHIMOTO'S THYROIDITIS: ICD-10-CM

## 2022-03-16 DIAGNOSIS — E53.8 B12 DEFICIENCY: ICD-10-CM

## 2022-03-16 DIAGNOSIS — K90.41 NON-CELIAC GLUTEN SENSITIVITY: ICD-10-CM

## 2022-03-16 DIAGNOSIS — E06.3 HYPOTHYROIDISM DUE TO HASHIMOTO'S THYROIDITIS: ICD-10-CM

## 2022-03-16 PROCEDURE — 99214 OFFICE O/P EST MOD 30 MIN: CPT | Performed by: INTERNAL MEDICINE

## 2022-03-16 NOTE — PROGRESS NOTES
"Established Patient    Chief Complaint   Patient presents with   • Follow-Up     Follow on meds.       Subjective:     HPI:   Roxane presents today with the following.    Patient Active Problem List    Diagnosis Date Noted   • RUBY (generalized anxiety disorder) 01/12/2022   • GERD (gastroesophageal reflux disease) 01/12/2022   • B12 deficiency 01/12/2022   • Falling hair 01/12/2022   • Non-celiac gluten sensitivity 01/12/2022   • Hashimoto's thyroiditis 01/29/2020   • Vitamin D deficiency 01/29/2020   • Hot flashes 05/02/2017   • Family history of thyroid cancer 05/21/2015   • Hypothyroidism 03/30/2015   • Migraine 03/30/2015       Current Outpatient Medications on File Prior to Visit   Medication Sig Dispense Refill   • ondansetron (ZOFRAN) 4 MG Tab tablet Take 1 Tablet by mouth every four hours as needed for Nausea/Vomiting for up to 3 days. 10 Tablet 0   • Cyanocobalamin (B-12 PO) Take  by mouth.     • SYNTHROID 88 MCG Tab Take 1 Tablet by mouth every morning on an empty stomach. 30 Tablet 6   • vitamin D2, Ergocalciferol, (DRISDOL) 1.25 MG (56870 UT) Cap capsule TAKE 1 CAPSULE BY MOUTH EVERY 7 DAYS 12 Capsule 2   • ferrous sulfate 325 (65 Fe) MG tablet Take on Monday, Wednesday and Friday. 90 Tablet 1   • pantoprazole (PROTONIX) 40 MG Tablet Delayed Response Take 1 tablet by mouth every day. 90 tablet 3     No current facility-administered medications on file prior to visit.       Allergies, past medical history, past surgical history, family history, social history reviewed and updated    ROS:  All other systems reviewed and are negative except as stated in the HPI       Physical Exam:     /64 (BP Location: Right arm, Patient Position: Sitting, BP Cuff Size: Adult)   Pulse 66   Temp 36.1 °C (97 °F) (Temporal)   Ht 1.676 m (5' 5.98\")   Wt 62.7 kg (138 lb 4.8 oz)   SpO2 98%   BMI 22.33 kg/m²   General: Normal appearing. No distress.  Pulmonary: Clear to ausculation.  Normal effort.   Cardiovascular: " Regular rate and rhythm    Assessment and Plan:     37 y.o. female with the following issues.    1. Hypothyroidism due to Hashimoto's thyroiditis  2. Vitamin D deficiency  3. B12 deficiency  4. Non-celiac gluten sensitivity  -Reported her palpitation improved after reducing Synthroid from 100 mcg to 88 mcg daily, also overall her symptom improved with intake of supplement including B12 vitamin D as well as iron,  -Also reported that her symptoms for GI is improving after elimination of gluten, patient otherwise denied having other related symptoms  -Educated regarding healthy lifestyle and healthy food and movement/exercise    Follow Up:      Return in about 2 months (around 5/16/2022).    Please note that this dictation was created using voice recognition software. I have made every reasonable attempt to correct obvious errors, but I expect that there are errors of grammar and possibly content that I did not discover before finalizing the note.    Signed by: Timo Greenwood M.D.

## 2022-03-21 ENCOUNTER — APPOINTMENT (OUTPATIENT)
Dept: BEHAVIORAL HEALTH | Facility: CLINIC | Age: 37
End: 2022-03-21
Payer: COMMERCIAL

## 2022-03-21 DIAGNOSIS — R42 DIZZINESS: ICD-10-CM

## 2022-03-21 DIAGNOSIS — H81.10 BENIGN PAROXYSMAL POSITIONAL VERTIGO, UNSPECIFIED LATERALITY: ICD-10-CM

## 2022-03-28 DIAGNOSIS — R42 DIZZINESS: ICD-10-CM

## 2022-04-21 ENCOUNTER — PHYSICAL THERAPY (OUTPATIENT)
Dept: PHYSICAL THERAPY | Facility: REHABILITATION | Age: 37
End: 2022-04-21
Attending: INTERNAL MEDICINE
Payer: COMMERCIAL

## 2022-04-21 DIAGNOSIS — H81.10 BPPV (BENIGN PAROXYSMAL POSITIONAL VERTIGO), UNSPECIFIED LATERALITY: ICD-10-CM

## 2022-04-21 DIAGNOSIS — R42 DIZZINESS: ICD-10-CM

## 2022-04-21 PROCEDURE — 97162 PT EVAL MOD COMPLEX 30 MIN: CPT

## 2022-04-21 SDOH — ECONOMIC STABILITY: GENERAL: QUALITY OF LIFE: GOOD

## 2022-04-21 ASSESSMENT — ENCOUNTER SYMPTOMS
PAIN SCALE AT HIGHEST: 8
PAIN SCALE AT LOWEST: 2
PAIN SCALE: 3

## 2022-04-21 NOTE — OP THERAPY EVALUATION
"  Outpatient Physical Therapy  VESTIBULAR EVALUATION    [address]  [address]  [facility] [address]  Phone:  [phone]  Fax:  [phone]    Date of Evaluation: 04/21/2022    Patient: Roxane Toth  YOB: 1985  MRN: [id number]     Referring Provider: Timo Greenwood M.D.  [facility] [facility] [facility] [address]  [place] [address]   Referring Diagnosis: Benign paroxysmal vertigo, unspecified ear [H81.10];Dizziness and giddiness [R42]     Time Calculation                     Chief Complaint: No chief complaint on file.    Visit Diagnoses     ICD-10-CM   1. Dizziness  R42   2. BPPV (benign paroxysmal positional vertigo), unspecified laterality  H81.10         History of Present Illness:     Date of onset:  3/21/2022    Mechanism of injury:  Patient reports to physical therapy with a chief complaint of vertigo starting one month ago when she was getting out of bed. The dizziness with occur when she is rolling over in bed, bending forward, turning her head side to side, and tilting her head into extension. She will get dizziness with bright/ florescent lighting, she has always been more sensitive to light. These movements will bring on the dizziness and nausea, but she has not thrown up due to symptoms. The nausea and spinning will last for a few minutes and then she will feel off for a while after.     She does get headaches with her dizziness that linger longer than the nausea and spinning. The head aches across her forehead like a sweatband and feel like someone is \"hitting a tin can in her head\". Since the dizziness started her headaches have been more frequent and more intense. She has headaches 4-5 days a week now, when before her average was 1-2 a week.     She reports not having any recent ear infections or illness.     The dizziness has been interfering with her work.Her symptoms start when she is looking down reading over papers/documents. Scrolling " on her computer or phone do not bring on her symptoms.     Quality of life:  Good    Sleep disturbance:  Interrupted sleep  Symptoms:     Current symptom rating:  3    At best symptom ratin    At worst symptom ratin  Patient goals:     Patient goals for therapy:  Increased motion      Past Medical History:   Diagnosis Date   • Heart burn    • Indigestion    • Renal disorder 2011    kidney infection   • Thyroid disease      Past Surgical History:   Procedure Laterality Date   • GASTROSCOPY-ENDO  10/25/2013    Performed by Zohaib Felix M.D. at SURGERY [facility]   • EGD WITH ASP/BX  10/25/2013    Performed by Zohaib Felix M.D. at [facility]   • ORIF, ELBOW  1991    right elbow   • TONSILLECTOMY AND ADENOIDECTOMY  as child     Social History     Tobacco Use   • Smoking status: Former Smoker     Packs/day: 1.00     Years: 17.00     Pack years: 17.00     Types: Cigarettes     Quit date: 2020     Years since quittin.2   • Smokeless tobacco: Never Used   Substance Use Topics   • Alcohol use: Yes     Comment: occasional     Family and Occupational History     Socioeconomic History   • Marital status:      Spouse name: Not on file   • Number of children: Not on file   • Years of education: Not on file   • Highest education level: Not on file   Occupational History   • Not on file       Objective:    Gait:     Comments: WNL  Oculomotor Exam:         Details: No spontaneous nystagmus central gaze          Details: No spontaneous nystagmus eccentric gaze    No saccadic eye movements    Smooth pursuit present    Convergence:        Normal convergence    Comments: Convergence WNL   Active Range of Motion:     Within functional limits    Active range of motion comments: Negative bilateral vertebral artery testing   With right vertebral artery testing she reported symptoms of room spinning and had to hang onto her chair as well as a sensation of nystagmus (no  visible nystagmus)   - Due to right [place]-Hallpike not reproducing those symptoms or having a nystagmus vertebral artery insufficiency is unlikely.   Limb Ataxia Exam:     Finger-to-Nose:         Intention tremor: none        Overshooting: none    Finger-to-Finger:         Horizontal overshooting: none        Vertical overshooting: none    Heel-to-Shin:         Intention tremor: none        Overshooting: none    Comments: Dysdiadochokinesia all WNL   Sensation Tests:     Comments: All dermatomes of cervical spine where WNL  BPPV Exam:     Normal [place]-Hallpike    Normal straight head-hanging test    Normal roll test      Exercises/Treatment  Time-based treatments/modalities:             Assessment:     Functional impairments:  Decreased utilization of VIS/vest/somato    Other impairments:  Dizziness impacting function    Assessment details:  Patient is a 37 year old female who presents to skilled physical therapy with a chief complaint of vertigo. Based off subjective reports it is likely patient has BPPV. Although objective tests and measures where negative today during assessment. Patient will benefit from a follow up appointment for reassessment and treatment of BPPV and continue to rule out a vestibular hypofunction.     Barriers to therapy:  Psychosocial    Prognosis: good    Goals:     Short term goals:  1. Patient will be independent with self Epply maneuver.   2. Patient will report ability to roll over in bed without an onset of vertigo for improvements with quality of sleep.     Short term goal time span:  1-2 weeks    Long term goals:  1. Patient will have a negative [place]-Hallpike test to demonstrate improvements with BPPV.   2. Patient will be able tolerate quick rotational movements without an increase in symptoms so she can check for traffic while driving.   3. Patient will show a 10% decrease in her impairments demonstrated by the DHI    Long term goal time span:  2-4 weeks  Plan:     Therapy options:   Physical therapy treatment to continue    Planned therapy interventions:  Canalith Repositioning (CPT 28983), Therapeutic Activities (CPT 63089), Therapeutic Exercise (CPT 88132) and Neuromuscular Re-education (CPT 48040)    Frequency:  1x week    Duration in weeks:  4    Discussed with:  Patient      Functional Assessment Used          Referring provider co-signature:  I have reviewed this plan of care and my co-signature certifies the need for services.    Certification Period: 04/21/2022 to  05/12/22    Physician Signature: ________________________________ Date: ______________       [x] As the licensed therapist supervising this student, I was present during the entire treatment session directing the care and reviewing the assessment plan.  I reviewed all documentation prior to signing. The following changes or alternations were made by the licensed therapist.

## 2022-04-27 ENCOUNTER — PHYSICAL THERAPY (OUTPATIENT)
Dept: PHYSICAL THERAPY | Facility: REHABILITATION | Age: 37
End: 2022-04-27
Attending: INTERNAL MEDICINE
Payer: COMMERCIAL

## 2022-04-27 DIAGNOSIS — R42 DIZZINESS: ICD-10-CM

## 2022-04-27 DIAGNOSIS — H81.10 BPPV (BENIGN PAROXYSMAL POSITIONAL VERTIGO), UNSPECIFIED LATERALITY: ICD-10-CM

## 2022-04-27 PROCEDURE — 97112 NEUROMUSCULAR REEDUCATION: CPT

## 2022-04-27 NOTE — OP THERAPY DAILY TREATMENT
Outpatient Physical Therapy  DAILY TREATMENT     [facility] [address].  [address]  [place] [address]  Phone:  [phone]  Fax:  [phone]    Date: 04/27/2022    Patient: Roxane Toth  YOB: 1985  MRN: [id number]     Time Calculation    Start time: 1300  Stop time: 1317 Time Calculation (min): 17 minutes         Chief Complaint: Dizziness and Vertigo    Visit #: 2    SUBJECTIVE:  Pt reports no symptoms. Feels this sensation like her head wants to spin/dizzy like a phantom feeling.     OBJECTIVE:            Therapeutic Treatments and Modalities:     1. Neuromuscular Re-education (CPT 39997)    Therapeutic Treatment and Modalities Summary: Self epley education, handout, and pt performance. Verbally revived handout instructions including post maneuver instructions, appropriate hold times and reps. Instructed pt after 2-3 dys of attempts to get referral and seek PT again for intervention. Educated to not use epley and prophylactic. Educated on anatomy/pathology including risk factors such as age and gender as well as reoccurrence rates. Pt verbalized understanding.     BPPV testing  Tariq moran pike, log roll, head hang negative bilaterally.     Time-based treatments/modalities:    Physical Therapy Timed Treatment Charges  Neuromusc re-ed, balance, coor, post minutes (CPT 29303): 17 minutes      ASSESSMENT:   Pt likely to have been suffering from BPPV at onset of symptoms however has resolved therefore session focused on education and pt understanding of self epley maneuver. As pt demonstrated good understanding and safety will leave her chart open x 30 days in case symptoms return and will DC. Pt verbalized agreement.     Short term goals:  1. Patient will be independent with self Epply maneuver. MET  2. Patient will report ability to roll over in bed without an onset of vertigo for improvements with quality of sleep. MET    Short term goal time span:  1-2  weeks    Long term goals:  1. Patient will have a negative Tariq-Hallpike test to demonstrate improvements with BPPV. MET  2. Patient will be able tolerate quick rotational movements without an increase in symptoms so she can check for traffic while driving. MET  3. Patient will show a 10% decrease in her impairments demonstrated by the DHI. Did not assess.     Long term goal time span:  2-4 weeks    PLAN/RECOMMENDATIONS:   Plan for treatment: Likely DC. Chart open x 30 days

## 2022-04-28 ENCOUNTER — TELEMEDICINE (OUTPATIENT)
Dept: BEHAVIORAL HEALTH | Facility: CLINIC | Age: 37
End: 2022-04-28
Payer: COMMERCIAL

## 2022-04-28 DIAGNOSIS — F41.1 GAD (GENERALIZED ANXIETY DISORDER): ICD-10-CM

## 2022-04-28 PROCEDURE — 90791 PSYCH DIAGNOSTIC EVALUATION: CPT | Performed by: SOCIAL WORKER

## 2022-04-28 ASSESSMENT — ANXIETY QUESTIONNAIRES
1. FEELING NERVOUS, ANXIOUS, OR ON EDGE: MORE THAN HALF THE DAYS
4. TROUBLE RELAXING: MORE THAN HALF THE DAYS
3. WORRYING TOO MUCH ABOUT DIFFERENT THINGS: MORE THAN HALF THE DAYS
7. FEELING AFRAID AS IF SOMETHING AWFUL MIGHT HAPPEN: SEVERAL DAYS
5. BEING SO RESTLESS THAT IT IS HARD TO SIT STILL: SEVERAL DAYS
2. NOT BEING ABLE TO STOP OR CONTROL WORRYING: MORE THAN HALF THE DAYS
GAD7 TOTAL SCORE: 12
6. BECOMING EASILY ANNOYED OR IRRITABLE: MORE THAN HALF THE DAYS

## 2022-04-28 ASSESSMENT — PATIENT HEALTH QUESTIONNAIRE - PHQ9
5. POOR APPETITE OR OVEREATING: 0 - NOT AT ALL
CLINICAL INTERPRETATION OF PHQ2 SCORE: 2
SUM OF ALL RESPONSES TO PHQ QUESTIONS 1-9: 7
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
## 2022-05-04 ENCOUNTER — APPOINTMENT (OUTPATIENT)
Dept: BEHAVIORAL HEALTH | Facility: CLINIC | Age: 37
End: 2022-05-04
Payer: COMMERCIAL

## 2022-05-31 ENCOUNTER — TELEMEDICINE (OUTPATIENT)
Dept: BEHAVIORAL HEALTH | Facility: CLINIC | Age: 37
End: 2022-05-31
Payer: COMMERCIAL

## 2022-05-31 DIAGNOSIS — F41.1 GAD (GENERALIZED ANXIETY DISORDER): ICD-10-CM

## 2022-05-31 PROCEDURE — 90837 PSYTX W PT 60 MINUTES: CPT | Mod: 95 | Performed by: SOCIAL WORKER

## 2022-05-31 NOTE — PROGRESS NOTES
"[facility]   Therapy Progress Note    This visit was conducted via Zoom using secure and encrypted videoconferencing technology.  The patient was in her home in the [facility].  The patient's identity was confirmed and verbal consent was obtained for this virtual visit.    Name: Roxane Toth  MRN: [id number]  : 1985  Age: 37 y.o.  Date of assessment: 2022  PCP: Timo Greenwood M.D..  Persons in attendance: Patient  Total session time: 55 minutes    Topics addressed in psychotherapy include: Pt to [place] video appt. Moved grandmother to [facility], aunt ; processed family issues. Clinical focus on growth from previous relationship, apologizing for \"bothering people,\" saying sorry repeatedly. Assisted pt with shifting thinking, with positive approach vs negative self portrayal. Pt agreed to work on this in current relationship and with mother. Plan: See biweekly.     Objective Observations:   Participation:Active verbal participation and Engaged   Grooming:Casual   Cognition:Fully Oriented   Eye Contact:Good   Mood:Euthymic   Affect:Flexible   Thought Process:Goal-directed   Speech:Rate within normal limits and Volume within normal limits    Current Risk:   Suicide: low   Homicide: NA   Self-Harm: NA   Relapse: NA   Safety Plan Reviewed: no    Care Plan Updated: No    Does patient express agreement with the above plan? Yes     Diagnosis:  1. RUBY    Therapeutic Intervention(s): Cognitive modification, Communication skills and Supportive psychotherapy    Treatment Goal(s)/Objective(s) addressed: grief    Progress toward Treatment Goals: No change    Referral appointment(s) scheduled? No       Cat Gomez L.C.S.W.    "

## 2022-06-06 ENCOUNTER — TELEPHONE (OUTPATIENT)
Dept: PHYSICAL THERAPY | Facility: REHABILITATION | Age: 37
End: 2022-06-06
Payer: COMMERCIAL

## 2022-06-06 NOTE — OP THERAPY DISCHARGE SUMMARY
"  Outpatient Physical Therapy  DISCHARGE SUMMARY NOTE      [facility] [place] [address]  [address]  [place] [address]  Phone:  [phone]  Fax:  [phone]    Date of Visit: 06/06/2022    Patient: Roxane Toth  YOB: 1985  MRN: [id number]     Referring Provider: Timo Greenwood M.D.  [facility]  [place] [address]   Referring Diagnosis: Benign paroxysmal vertigo, unspecified ear [H81.10];Dizziness and giddiness [R42]           Functional Assessment Used        Your patient is being discharged from Physical Therapy with the following comments:   · Goals Met    Comments:  Pt was last seen 4/27/22 since 30 days have passed appropriate to DC her chart.   \"Pt likely to have been suffering from BPPV at onset of symptoms however has resolved therefore session focused on education and pt understanding of self epley maneuver. As pt demonstrated good understanding and safety will leave her chart open x 30 days in case symptoms return and will DC. Pt verbalized agreement.      Short term goals:  1. Patient will be independent with self Epply maneuver. MET  2. Patient will report ability to roll over in bed without an onset of vertigo for improvements with quality of sleep. MET    Short term goal time span:  1-2 weeks    Long term goals:  1. Patient will have a negative Tariq-Hallpike test to demonstrate improvements with BPPV. MET  2. Patient will be able tolerate quick rotational movements without an increase in symptoms so she can check for traffic while driving. MET  3. Patient will show a 10% decrease in her impairments demonstrated by the DHI. Did not assess.     Long term goal time span:  2-4 weeks\"    Bing Moore, PT, DPT    Date: 6/6/2022       "

## 2022-06-08 ENCOUNTER — APPOINTMENT (OUTPATIENT)
Dept: PHYSICAL THERAPY | Facility: REHABILITATION | Age: 37
End: 2022-06-08
Attending: INTERNAL MEDICINE
Payer: COMMERCIAL

## 2022-06-15 ENCOUNTER — APPOINTMENT (OUTPATIENT)
Dept: PHYSICAL THERAPY | Facility: REHABILITATION | Age: 37
End: 2022-06-15
Attending: INTERNAL MEDICINE
Payer: COMMERCIAL

## 2022-06-22 ENCOUNTER — APPOINTMENT (OUTPATIENT)
Dept: PHYSICAL THERAPY | Facility: REHABILITATION | Age: 37
End: 2022-06-22
Attending: INTERNAL MEDICINE
Payer: COMMERCIAL

## 2022-06-29 ENCOUNTER — APPOINTMENT (OUTPATIENT)
Dept: PHYSICAL THERAPY | Facility: REHABILITATION | Age: 37
End: 2022-06-29
Attending: INTERNAL MEDICINE
Payer: COMMERCIAL

## 2022-07-05 ENCOUNTER — TELEMEDICINE (OUTPATIENT)
Dept: BEHAVIORAL HEALTH | Facility: CLINIC | Age: 37
End: 2022-07-05
Payer: COMMERCIAL

## 2022-07-05 DIAGNOSIS — F41.1 GAD (GENERALIZED ANXIETY DISORDER): ICD-10-CM

## 2022-07-05 PROCEDURE — 90837 PSYTX W PT 60 MINUTES: CPT | Mod: 95 | Performed by: SOCIAL WORKER
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
## 2022-07-21 ENCOUNTER — APPOINTMENT (OUTPATIENT)
Dept: LAB | Facility: MEDICAL CENTER | Age: 37
End: 2022-07-21
Attending: INTERNAL MEDICINE
Payer: COMMERCIAL

## 2022-08-03 ENCOUNTER — HOSPITAL ENCOUNTER (OUTPATIENT)
Dept: LAB | Facility: MEDICAL CENTER | Age: 37
End: 2022-08-03
Attending: INTERNAL MEDICINE
Payer: COMMERCIAL

## 2022-08-03 DIAGNOSIS — E03.9 ACQUIRED HYPOTHYROIDISM: ICD-10-CM

## 2022-08-03 DIAGNOSIS — E53.8 B12 DEFICIENCY: ICD-10-CM

## 2022-08-03 DIAGNOSIS — E55.9 VITAMIN D DEFICIENCY: ICD-10-CM

## 2022-08-03 DIAGNOSIS — E06.3 HASHIMOTO'S THYROIDITIS: ICD-10-CM

## 2022-08-03 LAB
25(OH)D3 SERPL-MCNC: 54 NG/ML (ref 30–100)
ALBUMIN SERPL BCP-MCNC: 4.6 G/DL (ref 3.2–4.9)
ALBUMIN/GLOB SERPL: 1.8 G/DL
ALP SERPL-CCNC: 52 U/L (ref 30–99)
ALT SERPL-CCNC: 16 U/L (ref 2–50)
ANION GAP SERPL CALC-SCNC: 11 MMOL/L (ref 7–16)
AST SERPL-CCNC: 18 U/L (ref 12–45)
BILIRUB SERPL-MCNC: 0.3 MG/DL (ref 0.1–1.5)
BUN SERPL-MCNC: 19 MG/DL (ref 8–22)
CALCIUM SERPL-MCNC: 9.5 MG/DL (ref 8.5–10.5)
CHLORIDE SERPL-SCNC: 108 MMOL/L (ref 96–112)
CO2 SERPL-SCNC: 24 MMOL/L (ref 20–33)
CREAT SERPL-MCNC: 0.74 MG/DL (ref 0.5–1.4)
GFR SERPLBLD CREATININE-BSD FMLA CKD-EPI: 108 ML/MIN/1.73 M 2
GLOBULIN SER CALC-MCNC: 2.5 G/DL (ref 1.9–3.5)
GLUCOSE SERPL-MCNC: 88 MG/DL (ref 65–99)
POTASSIUM SERPL-SCNC: 4.7 MMOL/L (ref 3.6–5.5)
PROT SERPL-MCNC: 7.1 G/DL (ref 6–8.2)
SODIUM SERPL-SCNC: 143 MMOL/L (ref 135–145)
T4 FREE SERPL-MCNC: 1.25 NG/DL (ref 0.93–1.7)
TSH SERPL DL<=0.005 MIU/L-ACNC: 2.6 UIU/ML (ref 0.38–5.33)
VIT B12 SERPL-MCNC: 334 PG/ML (ref 211–911)

## 2022-08-03 PROCEDURE — 84443 ASSAY THYROID STIM HORMONE: CPT

## 2022-08-03 PROCEDURE — 36415 COLL VENOUS BLD VENIPUNCTURE: CPT

## 2022-08-03 PROCEDURE — 82306 VITAMIN D 25 HYDROXY: CPT

## 2022-08-03 PROCEDURE — 80053 COMPREHEN METABOLIC PANEL: CPT

## 2022-08-03 PROCEDURE — 82607 VITAMIN B-12: CPT

## 2022-08-03 PROCEDURE — 84439 ASSAY OF FREE THYROXINE: CPT

## 2022-08-10 ENCOUNTER — OFFICE VISIT (OUTPATIENT)
Dept: ENDOCRINOLOGY | Facility: MEDICAL CENTER | Age: 37
End: 2022-08-10
Attending: INTERNAL MEDICINE
Payer: COMMERCIAL

## 2022-08-10 ENCOUNTER — TELEMEDICINE (OUTPATIENT)
Dept: BEHAVIORAL HEALTH | Facility: CLINIC | Age: 37
End: 2022-08-10
Payer: COMMERCIAL

## 2022-08-10 VITALS
HEART RATE: 79 BPM | DIASTOLIC BLOOD PRESSURE: 60 MMHG | SYSTOLIC BLOOD PRESSURE: 106 MMHG | OXYGEN SATURATION: 99 % | BODY MASS INDEX: 22.66 KG/M2 | WEIGHT: 141 LBS | HEIGHT: 66 IN

## 2022-08-10 DIAGNOSIS — E53.8 B12 DEFICIENCY: ICD-10-CM

## 2022-08-10 DIAGNOSIS — E03.8 HYPOTHYROIDISM DUE TO HASHIMOTO'S THYROIDITIS: ICD-10-CM

## 2022-08-10 DIAGNOSIS — E06.3 HASHIMOTO'S THYROIDITIS: ICD-10-CM

## 2022-08-10 DIAGNOSIS — E03.9 ACQUIRED HYPOTHYROIDISM: ICD-10-CM

## 2022-08-10 DIAGNOSIS — E06.3 HYPOTHYROIDISM DUE TO HASHIMOTO'S THYROIDITIS: ICD-10-CM

## 2022-08-10 DIAGNOSIS — E61.1 LOW IRON: ICD-10-CM

## 2022-08-10 DIAGNOSIS — E55.9 VITAMIN D DEFICIENCY: ICD-10-CM

## 2022-08-10 DIAGNOSIS — L65.9 HAIR LOSS: ICD-10-CM

## 2022-08-10 DIAGNOSIS — F41.1 GAD (GENERALIZED ANXIETY DISORDER): ICD-10-CM

## 2022-08-10 PROCEDURE — 90837 PSYTX W PT 60 MINUTES: CPT | Mod: 95 | Performed by: SOCIAL WORKER

## 2022-08-10 PROCEDURE — 99211 OFF/OP EST MAY X REQ PHY/QHP: CPT | Performed by: INTERNAL MEDICINE

## 2022-08-10 PROCEDURE — 99214 OFFICE O/P EST MOD 30 MIN: CPT | Performed by: INTERNAL MEDICINE

## 2022-08-10 NOTE — PROGRESS NOTES
[facility]   Therapy Progress Note    This visit was conducted via Zoom using secure and encrypted videoconferencing technology.  The patient was in his/her home in the [facility].  The patient's identity was confirmed and verbal consent was obtained for this virtual visit.    Name: Roxane Toth  MRN: [id number]  : 1985  Age: 37 y.o.  Date of assessment: 8/10/2022  PCP: Timo Greenwood M.D..  Persons in attendance: Patient  Total session time: 56 minutes    Topics addressed in psychotherapy include: Pt to [employer] video appt. Clinical focus on concepts of 75/25 rule and impact with negative people in life, coworker/mo. Pt making concerted effort to view more introspectively, challenge thinking errors and negative self talk. Encouraged daily check ins, daily gratitude, journaling.   Set limits with ex husb, divorce finalized. Plan: see biweekly.      Objective Observations:   Participation:Active verbal participation, Engaged, and Open to feedback   Grooming:Casual   Cognition:Fully Oriented   Eye Contact:Good   Mood:Euthymic   Affect:Congruent with content   Thought Process:Logical   Speech:Rate within normal limits and Volume within normal limits    Current Risk:   Suicide: na   Homicide: na   Self-Harm: na   Relapse: na   Safety Plan Reviewed: na    Care Plan Updated: No    Does patient express agreement with the above plan? Yes     Diagnosis:  RUBY    Therapeutic Intervention(s): Cognitive modification, Communication skills, and Interpersonal effectiveness skills    Treatment Goal(s)/Objective(s) addressed: anxiety, communicating effectively     Progress toward Treatment Goals: Mild improvement    Referral appointment(s) scheduled? No       Cat Gomez L.C.S.W.

## 2022-08-10 NOTE — PROGRESS NOTES
Chief Complaint: Follow up for Primary Hypothyroidism.     HPI:     Roxane Toth is a 37 y.o. female here for follow up of Primary Hypothyroidism.  She was previously a patient of DEYSI Arce      She remains on Synthroid 88mcg daily which has been her dose for over 6  months.   She reports excellent compliance and denies missing any daily doses.   She takes thyroid hormone prior to breakfast.   She  denies taking any iron, calcium supplements or antacids.      Her energy is good  She reports palpitations have decreased significantly after we lowered her Synthroid from 100mcg to 88mcg  She still reports hair loss  (We discovered  previously that her ferritin levels are borderline and B12 levels are borderline so we have discussed replacement therapy or supplementation for both issues)    She also has a history of vitamin D deficiency and is on replacement therapy her last vitamin D was adequate at 46 on January 2021  She takes ergocalciferol 50,000 units weekly      Her TSH is 2.6 on 8/3/2022 (while on Synthroid 88)    Her TSH was 0.650 with a free T4 of 1.40 on Jan 28, 2022 (while on Synthroid 100)    Her TSH was  0.320 with a free T4 1.75 on July 24, 2021 (while on Synthroid 112)      Her most recent B12 is still low at 334 on August 2022  Her last ferritin was suboptimal at 44 on January 2022      With regards to her social history 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      ROS:     CONS:     No fever, no chills   EYES:     No diplopia, no blurry vision   CV:           No chest pain, no palpitations   PULM:     No SOB, no cough, no hemoptysis.   GI:            No nausea, no vomiting, no diarrhea, no constipation   ENDO:     No polyuria, no polydipsia, no heat intolerance, no cold intolerance       Past Medical History:  Problem List:  2022-01: RUBY (generalized anxiety  "disorder)  2022: GERD (gastroesophageal reflux disease)  2022: B12 deficiency  2022: Falling hair  2022: Non-celiac gluten sensitivity  2020: Hashimoto's thyroiditis  2020: Vitamin D deficiency  2019: Tobacco abuse  2017: Hot flashes  2017: Dyspepsia  2015: Subclinical hypothyroidism  2015: Family history of thyroid cancer  2015: Hypothyroidism  2015: Migraine  2013-10: Abdominal pain, other specified site  2012: Health examination of defined subpopulation    Past Surgical History:  Past Surgical History:   Procedure Laterality Date    GASTROSCOPY-ENDO  10/25/2013    Performed by Zohaib Felix M.D. at SURGERY [facility]    EGD WITH ASP/BX  10/25/2013    Performed by Zohaib Felix M.D. at [facility]    ORIF, ELBOW  1991    right elbow    TONSILLECTOMY AND ADENOIDECTOMY  as child        Allergies:  Iodine     Social History:  Social History     Tobacco Use    Smoking status: Former     Packs/day: 1.00     Years: 17.00     Pack years: 17.00     Types: Cigarettes     Quit date: 2020     Years since quittin.5    Smokeless tobacco: Never   Vaping Use    Vaping Use: Never used   Substance Use Topics    Alcohol use: Yes     Comment: occasional    Drug use: Not Currently        Family History:   family history includes Cancer in her mother; Diabetes in an other family member; Heart Disease in an other family member; Hypertension in an other family member; Other in her father; Stroke in an other family member.      PHYSICAL EXAM:   Vital signs: /60 (BP Location: Left arm, Patient Position: Sitting, BP Cuff Size: Adult)   Pulse 79   Ht 1.676 m (5' 6\") Comment: pt stated  Wt 64 kg (141 lb)   SpO2 99%   BMI 22.76 kg/m²   GENERAL: Well-developed, well-nourished in no apparent distress.   EYE:  No ocular asymmetry, PERRLA  HENT: Pink, moist mucous membranes.    NECK: Thyroid is slightly enlarged and feels bosselated  CARDIOVASCULAR: "  No murmurs  LUNGS: Clear breath sounds  ABDOMEN: Soft, nontender   EXTREMITIES: No clubbing, cyanosis, or edema.   NEUROLOGICAL: No gross focal motor abnormalities   LYMPH: No cervical adenopathy seen  SKIN: No rashes, lesions.       Labs:  Lab Results   Component Value Date/Time    SODIUM 143 2022 11:44 AM    POTASSIUM 4.7 2022 11:44 AM    CHLORIDE 108 2022 11:44 AM    CO2 24 2022 11:44 AM    ANION 11.0 2022 11:44 AM    GLUCOSE 88 2022 11:44 AM    BUN 19 2022 11:44 AM    CREATININE 0.74 2022 11:44 AM    CREATININE 0.7 2007 09:55 PM    CALCIUM 9.5 2022 11:44 AM    ASTSGOT 18 2022 11:44 AM    ALTSGPT 16 2022 11:44 AM    TBILIRUBIN 0.3 2022 11:44 AM    ALBUMIN 4.6 2022 11:44 AM    TOTPROTEIN 7.1 2022 11:44 AM    GLOBULIN 2.5 2022 11:44 AM    AGRATIO 1.8 2022 11:44 AM       Lab Results   Component Value Date/Time    SODIUM 138 2019 0723    POTASSIUM 3.7 2019 0723    CHLORIDE 105 2019 0723    CO2 25 2019 0723    GLUCOSE 90 2019 0707    BUN 10 2019 0723    CREATININE 0.73 2019 0723    CALCIUM 9.8 2019 0723    ANION 8.0 2019 0723       Lab Results   Component Value Date/Time    CHOLSTRLTOT 147 2019 0707    TRIGLYCERIDE 47 2019 0707    HDL 60 2019 0707    LDL 78 2019 0707       Lab Results   Component Value Date/Time    TSHULTRASEN 0.540 2020 1730     Lab Results   Component Value Date/Time    FREET4 1.18 2020 1730     Lab Results   Component Value Date/Time    FREET3 3.40 2019 1726     No results found for: THYSTIMIG    Lab Results   Component Value Date/Time    MICROSOMALA 1556.9 (H) 2015 1043         Imagin2017 4:23 PM     HISTORY/REASON FOR EXAM:  Hyperthyroidism and history of heterogeneous thyroid        TECHNIQUE/EXAM DESCRIPTION:  Ultrasound of the soft tissues of the head and neck.     COMPARISON:   06/01/2015     FINDINGS:  The thyroid gland is heterogeneous.  Vascularity is normal.     The right lobe of the thyroid gland measures 1.40 cm x 3.33 cm x 1.41 cm.  The left lobe of the thyroid gland measures 1.16 cm x 3.48 cm x 1.11 cm.  The isthmus measures 0.24 cm.           IMPRESSION:     1.  Thyroid gland again appears heterogeneous with appearance that is unchanged compared to the prior exam.     2.  No mass or nodule has developed since the prior exam.        ASSESSMENT/PLAN:     1. Acquired hypothyroidism  Controlled  Continue Synthroid  88 MCG daily  Follow-up in 6 months with repeat of TSH    2. Hashimoto's thyroiditis  This is the etiology of her hypothyroidism  As proven by her elevated TPO antibodies of 1500 from May 2015    3. Vitamin D deficiency  Controlled  Continue ergocalciferol weekly  Continue     4. B12 deficiency  B12 levels are still borderline low  I want her to start B12 sublingual 1000 mcg daily  Repeat labs in 6 months    5. Hair loss  Stable  Advised patient to take iron supplements  Reminded patient to take iron supplements 4 hours apart from Synthroid  Start B12 supplements      Return in about 6 months (around 2/10/2023).       Thank you kindly for allowing me to participate in the thyroid care plan for this patient.    David Schmitz MD, [facility]  01/29/20    CC:   Parmjit Arteaga, A.P.SYED.

## 2022-09-14 ENCOUNTER — TELEMEDICINE (OUTPATIENT)
Dept: BEHAVIORAL HEALTH | Facility: CLINIC | Age: 37
End: 2022-09-14
Payer: COMMERCIAL

## 2022-09-14 DIAGNOSIS — F41.1 GAD (GENERALIZED ANXIETY DISORDER): ICD-10-CM

## 2022-09-14 PROCEDURE — 90837 PSYTX W PT 60 MINUTES: CPT | Mod: 95 | Performed by: SOCIAL WORKER
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
## 2022-09-17 DIAGNOSIS — E03.9 ACQUIRED HYPOTHYROIDISM: ICD-10-CM

## 2022-09-19 RX ORDER — LEVOTHYROXINE SODIUM 88 MCG
TABLET ORAL
Qty: 30 TABLET | Refills: 6 | Status: SHIPPED | OUTPATIENT
Start: 2022-09-19 | End: 2023-02-15 | Stop reason: SDUPTHER

## 2022-10-03 ENCOUNTER — IMMUNIZATION (OUTPATIENT)
Dept: OCCUPATIONAL MEDICINE | Facility: CLINIC | Age: 37
End: 2022-10-03

## 2022-10-03 DIAGNOSIS — Z23 NEED FOR VACCINATION: Primary | ICD-10-CM

## 2022-10-03 PROCEDURE — 90686 IIV4 VACC NO PRSV 0.5 ML IM: CPT | Performed by: PREVENTIVE MEDICINE

## 2022-10-09 DIAGNOSIS — E55.9 VITAMIN D DEFICIENCY: ICD-10-CM

## 2022-10-10 RX ORDER — ERGOCALCIFEROL 1.25 MG/1
CAPSULE ORAL
Qty: 12 CAPSULE | Refills: 2 | Status: SHIPPED | OUTPATIENT
Start: 2022-10-10 | End: 2023-02-15 | Stop reason: SDUPTHER

## 2022-10-12 ENCOUNTER — TELEMEDICINE (OUTPATIENT)
Dept: BEHAVIORAL HEALTH | Facility: CLINIC | Age: 37
End: 2022-10-12
Payer: COMMERCIAL

## 2022-10-12 DIAGNOSIS — F41.1 GAD (GENERALIZED ANXIETY DISORDER): ICD-10-CM

## 2022-10-12 PROCEDURE — 90837 PSYTX W PT 60 MINUTES: CPT | Mod: 95 | Performed by: SOCIAL WORKER

## 2022-10-12 NOTE — PROGRESS NOTES
[facility]   Therapy Progress Note    This visit was conducted via Zoom using secure and encrypted videoconferencing technology.  The patient was in her home in the [facility].  The patient's identity was confirmed and verbal consent was obtained for this virtual visit.    Name: Roxane Toth  MRN: [id number]  : 1985  Age: 37 y.o.  Date of assessment: 10/12/2022  PCP: Timo Greenwood M.D..  Persons in attendance: Patient  Total session time: 55 minutes    Topics addressed in psychotherapy include: Pt to indiv video appt.   Setting limits with xhusb, thoughts/emotions of long term relationship with bf/children. Assisted pt with limit setting ideas, time frames with x and current relationship. Pt also interested in testing for ASD and will identify areas of concern difficulty for her to proceed with working on.  Plan: see biweekly.     Objective Observations:   Participation:Active verbal participation, Engaged, and Open to feedback   Grooming:Casual   Cognition:Fully Oriented   Eye Contact:Good   Mood:Anxious   Affect:Congruent with content   Thought Process:Goal-directed   Speech:Rate within normal limits and Volume within normal limits    Current Risk:   Suicide: NA   Homicide: NA   Self-Harm: NA   Relapse: NA   Safety Plan Reviewed: no    Care Plan Updated: No    Does patient express agreement with the above plan? Yes     Diagnosis:  RUBY    Therapeutic Intervention(s): Communication skills, Goal-setting, and Limit-setting    Treatment Goal(s)/Objective(s) addressed: limti setting and anxiety    Progress toward Treatment Goals: No change    Referral appointment(s) scheduled? No       Cat Gomez L.C.S.W.

## 2022-12-06 ENCOUNTER — APPOINTMENT (OUTPATIENT)
Dept: BEHAVIORAL HEALTH | Facility: CLINIC | Age: 37
End: 2022-12-06
Payer: COMMERCIAL

## 2023-01-11 ENCOUNTER — APPOINTMENT (OUTPATIENT)
Dept: BEHAVIORAL HEALTH | Facility: CLINIC | Age: 38
End: 2023-01-11
Payer: COMMERCIAL

## 2023-02-07 DIAGNOSIS — E03.9 ACQUIRED HYPOTHYROIDISM: ICD-10-CM

## 2023-02-07 RX ORDER — FERROUS SULFATE 325(65) MG
TABLET ORAL
Qty: 36 TABLET | Refills: 4 | Status: SHIPPED | OUTPATIENT
Start: 2023-02-07

## 2023-02-08 ENCOUNTER — HOSPITAL ENCOUNTER (OUTPATIENT)
Dept: LAB | Facility: MEDICAL CENTER | Age: 38
End: 2023-02-08
Attending: INTERNAL MEDICINE
Payer: COMMERCIAL

## 2023-02-08 DIAGNOSIS — E61.1 LOW IRON: ICD-10-CM

## 2023-02-08 DIAGNOSIS — E55.9 VITAMIN D DEFICIENCY: ICD-10-CM

## 2023-02-08 DIAGNOSIS — E03.9 ACQUIRED HYPOTHYROIDISM: ICD-10-CM

## 2023-02-08 DIAGNOSIS — L65.9 HAIR LOSS: ICD-10-CM

## 2023-02-08 DIAGNOSIS — E53.8 B12 DEFICIENCY: ICD-10-CM

## 2023-02-08 DIAGNOSIS — E06.3 HASHIMOTO'S THYROIDITIS: ICD-10-CM

## 2023-02-08 LAB
25(OH)D3 SERPL-MCNC: 41 NG/ML (ref 30–100)
ALBUMIN SERPL BCP-MCNC: 4.6 G/DL (ref 3.2–4.9)
ALBUMIN/GLOB SERPL: 1.8 G/DL
ALP SERPL-CCNC: 51 U/L (ref 30–99)
ALT SERPL-CCNC: 13 U/L (ref 2–50)
ANION GAP SERPL CALC-SCNC: 13 MMOL/L (ref 7–16)
AST SERPL-CCNC: 20 U/L (ref 12–45)
BASOPHILS # BLD AUTO: 0.4 % (ref 0–1.8)
BASOPHILS # BLD: 0.03 K/UL (ref 0–0.12)
BILIRUB SERPL-MCNC: 0.4 MG/DL (ref 0.1–1.5)
BUN SERPL-MCNC: 8 MG/DL (ref 8–22)
CALCIUM ALBUM COR SERPL-MCNC: 9.4 MG/DL (ref 8.5–10.5)
CALCIUM SERPL-MCNC: 9.9 MG/DL (ref 8.5–10.5)
CHLORIDE SERPL-SCNC: 108 MMOL/L (ref 96–112)
CO2 SERPL-SCNC: 24 MMOL/L (ref 20–33)
CREAT SERPL-MCNC: 0.7 MG/DL (ref 0.5–1.4)
EOSINOPHIL # BLD AUTO: 0.06 K/UL (ref 0–0.51)
EOSINOPHIL NFR BLD: 0.9 % (ref 0–6.9)
ERYTHROCYTE [DISTWIDTH] IN BLOOD BY AUTOMATED COUNT: 45 FL (ref 35.9–50)
FASTING STATUS PATIENT QL REPORTED: NORMAL
FERRITIN SERPL-MCNC: 63.1 NG/ML (ref 10–291)
GFR SERPLBLD CREATININE-BSD FMLA CKD-EPI: 115 ML/MIN/1.73 M 2
GLOBULIN SER CALC-MCNC: 2.6 G/DL (ref 1.9–3.5)
GLUCOSE SERPL-MCNC: 118 MG/DL (ref 65–99)
HCT VFR BLD AUTO: 42 % (ref 37–47)
HGB BLD-MCNC: 13.9 G/DL (ref 12–16)
IMM GRANULOCYTES # BLD AUTO: 0.05 K/UL (ref 0–0.11)
IMM GRANULOCYTES NFR BLD AUTO: 0.7 % (ref 0–0.9)
IRON SATN MFR SERPL: 49 % (ref 15–55)
IRON SERPL-MCNC: 125 UG/DL (ref 40–170)
LYMPHOCYTES # BLD AUTO: 2.03 K/UL (ref 1–4.8)
LYMPHOCYTES NFR BLD: 29.1 % (ref 22–41)
MCH RBC QN AUTO: 32.7 PG (ref 27–33)
MCHC RBC AUTO-ENTMCNC: 33.1 G/DL (ref 33.6–35)
MCV RBC AUTO: 98.8 FL (ref 81.4–97.8)
MONOCYTES # BLD AUTO: 0.47 K/UL (ref 0–0.85)
MONOCYTES NFR BLD AUTO: 6.7 % (ref 0–13.4)
NEUTROPHILS # BLD AUTO: 4.33 K/UL (ref 2–7.15)
NEUTROPHILS NFR BLD: 62.2 % (ref 44–72)
NRBC # BLD AUTO: 0 K/UL
NRBC BLD-RTO: 0 /100 WBC
PLATELET # BLD AUTO: 212 K/UL (ref 164–446)
PMV BLD AUTO: 12.9 FL (ref 9–12.9)
POTASSIUM SERPL-SCNC: 3.8 MMOL/L (ref 3.6–5.5)
PROT SERPL-MCNC: 7.2 G/DL (ref 6–8.2)
RBC # BLD AUTO: 4.25 M/UL (ref 4.2–5.4)
SODIUM SERPL-SCNC: 145 MMOL/L (ref 135–145)
T4 FREE SERPL-MCNC: 1.3 NG/DL (ref 0.93–1.7)
TIBC SERPL-MCNC: 254 UG/DL (ref 250–450)
TSH SERPL DL<=0.005 MIU/L-ACNC: 3.36 UIU/ML (ref 0.38–5.33)
UIBC SERPL-MCNC: 129 UG/DL (ref 110–370)
VIT B12 SERPL-MCNC: 920 PG/ML (ref 211–911)
WBC # BLD AUTO: 7 K/UL (ref 4.8–10.8)

## 2023-02-08 PROCEDURE — 80053 COMPREHEN METABOLIC PANEL: CPT

## 2023-02-08 PROCEDURE — 84439 ASSAY OF FREE THYROXINE: CPT

## 2023-02-08 PROCEDURE — 82306 VITAMIN D 25 HYDROXY: CPT

## 2023-02-08 PROCEDURE — 82728 ASSAY OF FERRITIN: CPT

## 2023-02-08 PROCEDURE — 83550 IRON BINDING TEST: CPT

## 2023-02-08 PROCEDURE — 36415 COLL VENOUS BLD VENIPUNCTURE: CPT

## 2023-02-08 PROCEDURE — 83540 ASSAY OF IRON: CPT

## 2023-02-08 PROCEDURE — 85025 COMPLETE CBC W/AUTO DIFF WBC: CPT

## 2023-02-08 PROCEDURE — 84443 ASSAY THYROID STIM HORMONE: CPT

## 2023-02-08 PROCEDURE — 82607 VITAMIN B-12: CPT

## 2023-02-15 ENCOUNTER — OFFICE VISIT (OUTPATIENT)
Dept: ENDOCRINOLOGY | Facility: MEDICAL CENTER | Age: 38
End: 2023-02-15
Attending: INTERNAL MEDICINE
Payer: COMMERCIAL

## 2023-02-15 VITALS
BODY MASS INDEX: 22.66 KG/M2 | OXYGEN SATURATION: 100 % | WEIGHT: 141 LBS | DIASTOLIC BLOOD PRESSURE: 60 MMHG | SYSTOLIC BLOOD PRESSURE: 104 MMHG | HEART RATE: 68 BPM | HEIGHT: 66 IN

## 2023-02-15 DIAGNOSIS — E55.9 VITAMIN D DEFICIENCY: ICD-10-CM

## 2023-02-15 DIAGNOSIS — E53.8 B12 DEFICIENCY: ICD-10-CM

## 2023-02-15 DIAGNOSIS — E06.3 HASHIMOTO'S THYROIDITIS: ICD-10-CM

## 2023-02-15 DIAGNOSIS — E03.9 ACQUIRED HYPOTHYROIDISM: ICD-10-CM

## 2023-02-15 DIAGNOSIS — L65.9 HAIR LOSS: ICD-10-CM

## 2023-02-15 PROCEDURE — 99214 OFFICE O/P EST MOD 30 MIN: CPT | Performed by: INTERNAL MEDICINE

## 2023-02-15 PROCEDURE — 99211 OFF/OP EST MAY X REQ PHY/QHP: CPT | Performed by: INTERNAL MEDICINE

## 2023-02-15 RX ORDER — ERGOCALCIFEROL 1.25 MG/1
50000 CAPSULE ORAL
Qty: 12 CAPSULE | Refills: 3 | Status: SHIPPED | OUTPATIENT
Start: 2023-02-15 | End: 2024-02-14 | Stop reason: SDUPTHER

## 2023-02-15 RX ORDER — LEVOTHYROXINE SODIUM 88 MCG
88 TABLET ORAL
Qty: 90 TABLET | Refills: 3 | Status: SHIPPED | OUTPATIENT
Start: 2023-02-15 | End: 2023-09-14

## 2023-02-15 ASSESSMENT — FIBROSIS 4 INDEX: FIB4 SCORE: 0.94

## 2023-02-15 ASSESSMENT — PATIENT HEALTH QUESTIONNAIRE - PHQ9: CLINICAL INTERPRETATION OF PHQ2 SCORE: 0

## 2023-02-15 NOTE — PROGRESS NOTES
Chief Complaint: Follow up for Primary Hypothyroidism.     HPI:     Roxane Toth is a 38 y.o. female here for follow up of Primary Hypothyroidism.  She was previously a patient of DEYSI Arce    Historically she had palpitations with Synthroid 100 mcg daily  She also had hair loss secondary to iron deficiency    Social history wise, she works at [employer] at the [employer]ing department      She remains on Synthroid 88mcg daily which has been her dose for over 12  months.   She reports excellent compliance and denies missing any daily doses.   She takes thyroid hormone prior to breakfast.   She  denies taking any iron, calcium supplements or antacids.      She feels great overall  She reports that her hair loss has decreased  She no longer has palpitations  Her energy is good  She denies constipation cold intolerance    Her thyroid labs showed TSH 3.3 and free T4 is 1.30 on February 8, 2023      Her vitamin D is 41    Her ferritin is 63    Her B12 is 920          Patient's medications, allergies, and social histories were reviewed and updated as appropriate.      ROS:     CONS:     No fever, no chills   EYES:     No diplopia, no blurry vision   CV:           No chest pain, no palpitations   PULM:     No SOB, no cough, no hemoptysis.   GI:            No nausea, no vomiting, no diarrhea, no constipation   ENDO:     No polyuria, no polydipsia, no heat intolerance, no cold intolerance       Past Medical History:  Problem List:  2022-01: RUBY (generalized anxiety disorder)  2022-01: GERD (gastroesophageal reflux disease)  2022-01: B12 deficiency  2022-01: Hair loss  2022-01: Non-celiac gluten sensitivity  2020-01: Hashimoto's thyroiditis  2020-01: Vitamin D deficiency  2019-01: Tobacco abuse  2017-05: Hot flashes  2017-02: Dyspepsia  2015-05: Subclinical hypothyroidism  2015-05: Family history of thyroid cancer  2015-03: Hypothyroidism  2015-03: Migraine  2013-10: Abdominal pain, other specified  "site  2012-04: Health examination of defined subpopulation      Past Surgical History:  Past Surgical History:   Procedure Laterality Date    GASTROSCOPY-ENDO  10/25/2013    Performed by Zohaib Felix M.D. at SURGERY [facility]    EGD WITH ASP/BX  10/25/2013    Performed by Zohaib Felix M.D. at SURGERY [facility]    ORIF, ELBOW  1991    right elbow    TONSILLECTOMY AND ADENOIDECTOMY  as child        Allergies:  Iodine     Social History:  Social History     Tobacco Use    Smoking status: Former     Packs/day: 1.00     Years: 17.00     Pack years: 17.00     Types: Cigarettes     Quit date: 1/12/2020     Years since quitting: 3.0    Smokeless tobacco: Never   Vaping Use    Vaping Use: Never used   Substance Use Topics    Alcohol use: Yes     Comment: occasional    Drug use: Not Currently        Family History:   family history includes Cancer in her mother; Diabetes in an other family member; Heart Disease in an other family member; Hypertension in an other family member; Other in her father; Stroke in an other family member.      PHYSICAL EXAM:   Vital signs: /60   Pulse 68   Ht 1.676 m (5' 6\")   Wt 64 kg (141 lb)   SpO2 100%   BMI 22.76 kg/m²   GENERAL: Well-developed, well-nourished in no apparent distress.   EYE:  No ocular asymmetry, PERRLA  HENT: Pink, moist mucous membranes.    NECK: Thyroid is slightly enlarged and feels bosselated  CARDIOVASCULAR:  No murmurs  LUNGS: Clear breath sounds  ABDOMEN: Soft, nontender   EXTREMITIES: No clubbing, cyanosis, or edema.   NEUROLOGICAL: No gross focal motor abnormalities   LYMPH: No cervical adenopathy seen  SKIN: No rashes, lesions.       Labs:  Lab Results   Component Value Date/Time    SODIUM 145 02/08/2023 02:04 PM    POTASSIUM 3.8 02/08/2023 02:04 PM    CHLORIDE 108 02/08/2023 02:04 PM    CO2 24 02/08/2023 02:04 PM    ANION 13.0 02/08/2023 02:04 PM    GLUCOSE 118 (H) 02/08/2023 02:04 PM    BUN 8 02/08/2023 02:04 PM    " CREATININE 0.70 2023 02:04 PM    CREATININE 0.7 2007 09:55 PM    CALCIUM 9.9 2023 02:04 PM    ASTSGOT 20 2023 02:04 PM    ALTSGPT 13 2023 02:04 PM    TBILIRUBIN 0.4 2023 02:04 PM    ALBUMIN 4.6 2023 02:04 PM    TOTPROTEIN 7.2 2023 02:04 PM    GLOBULIN 2.6 2023 02:04 PM    AGRATIO 1.8 2023 02:04 PM       Lab Results   Component Value Date/Time    SODIUM 138 2019 0723    POTASSIUM 3.7 2019 0723    CHLORIDE 105 2019 0723    CO2 25 2019 0723    GLUCOSE 90 2019 0707    BUN 10 2019 0723    CREATININE 0.73 2019 0723    CALCIUM 9.8 2019 0723    ANION 8.0 2019 0723       Lab Results   Component Value Date/Time    CHOLSTRLTOT 147 2019 0707    TRIGLYCERIDE 47 2019 0707    HDL 60 2019 0707    LDL 78 2019 0707       Lab Results   Component Value Date/Time    TSHULTRASEN 0.540 2020 1730     Lab Results   Component Value Date/Time    FREET4 1.18 2020 1730     Lab Results   Component Value Date/Time    FREET3 3.40 2019 1726     No results found for: THYSTIMIG    Lab Results   Component Value Date/Time    MICROSOMALA 1556.9 (H) 2015 1043         Imagin2017 4:23 PM     HISTORY/REASON FOR EXAM:  Hyperthyroidism and history of heterogeneous thyroid        TECHNIQUE/EXAM DESCRIPTION:  Ultrasound of the soft tissues of the head and neck.     COMPARISON:  2015     FINDINGS:  The thyroid gland is heterogeneous.  Vascularity is normal.     The right lobe of the thyroid gland measures 1.40 cm x 3.33 cm x 1.41 cm.  The left lobe of the thyroid gland measures 1.16 cm x 3.48 cm x 1.11 cm.  The isthmus measures 0.24 cm.           IMPRESSION:     1.  Thyroid gland again appears heterogeneous with appearance that is unchanged compared to the prior exam.     2.  No mass or nodule has developed since the prior exam.        ASSESSMENT/PLAN:     1. Acquired  hypothyroidism  Controlled  Continue Synthroid  88 MCG daily  Reviewed proper administration of thyroid hormone  Patient should take thyroid hormone 30-60 minutes before breakfast on an empty stomach plain water and not take it together with food, iron, calcium, and antacids.  Iron, calcium, and antacids should be taken at least 4 hours apart from thyroid hormone.  Follow-up in 1 year    2. Hashimoto's thyroiditis  This is the etiology of her hypothyroidism  As proven by her elevated TPO antibodies of 1500 from May 2015    3. Vitamin D deficiency  Stable   Vitamin D labs were reviewed with patient  Continue current supplements  Continue monitoring levels       4. B12 deficiency  Controlled  Continue B12 sublingual 1000 mcg daily  Continue monitoring    5. Hair loss  Improved  She no longer has hair loss  Advised patient to take iron supplements  Reminded patient to take iron supplements 4 hours apart from Synthroid      Return in about 1 year (around 2/15/2024).       Thank you kindly for allowing me to participate in the thyroid care plan for this patient.    David Schmitz MD, FACE, [facility]      CC:   JOSEPH Goodman

## 2023-03-08 ENCOUNTER — APPOINTMENT (OUTPATIENT)
Dept: BEHAVIORAL HEALTH | Facility: CLINIC | Age: 38
End: 2023-03-08
Payer: COMMERCIAL

## 2023-03-16 ENCOUNTER — APPOINTMENT (OUTPATIENT)
Dept: BEHAVIORAL HEALTH | Facility: CLINIC | Age: 38
End: 2023-03-16
Payer: COMMERCIAL

## 2023-05-26 ENCOUNTER — PATIENT MESSAGE (OUTPATIENT)
Dept: HEALTH INFORMATION MANAGEMENT | Facility: OTHER | Age: 38
End: 2023-05-26

## 2023-07-31 ENCOUNTER — DOCUMENTATION (OUTPATIENT)
Dept: HEALTH INFORMATION MANAGEMENT | Facility: OTHER | Age: 38
End: 2023-07-31
Payer: COMMERCIAL

## 2023-08-09 ENCOUNTER — OFFICE VISIT (OUTPATIENT)
Dept: MEDICAL GROUP | Facility: PHYSICIAN GROUP | Age: 38
End: 2023-08-09
Payer: COMMERCIAL

## 2023-08-09 VITALS
SYSTOLIC BLOOD PRESSURE: 86 MMHG | BODY MASS INDEX: 21.57 KG/M2 | HEART RATE: 84 BPM | HEIGHT: 66 IN | DIASTOLIC BLOOD PRESSURE: 56 MMHG | TEMPERATURE: 98.2 F | OXYGEN SATURATION: 99 % | WEIGHT: 134.2 LBS

## 2023-08-09 DIAGNOSIS — N94.6 DYSMENORRHEA: ICD-10-CM

## 2023-08-09 DIAGNOSIS — G43.109 MIGRAINE WITH AURA AND WITHOUT STATUS MIGRAINOSUS, NOT INTRACTABLE: ICD-10-CM

## 2023-08-09 DIAGNOSIS — K21.9 GASTROESOPHAGEAL REFLUX DISEASE WITHOUT ESOPHAGITIS: ICD-10-CM

## 2023-08-09 DIAGNOSIS — L65.9 HAIR LOSS: ICD-10-CM

## 2023-08-09 PROCEDURE — 99214 OFFICE O/P EST MOD 30 MIN: CPT | Performed by: NURSE PRACTITIONER

## 2023-08-09 PROCEDURE — 3074F SYST BP LT 130 MM HG: CPT | Performed by: NURSE PRACTITIONER

## 2023-08-09 PROCEDURE — 3078F DIAST BP <80 MM HG: CPT | Performed by: NURSE PRACTITIONER

## 2023-08-09 RX ORDER — PANTOPRAZOLE SODIUM 40 MG/1
40 TABLET, DELAYED RELEASE ORAL
Qty: 90 TABLET | Refills: 3 | Status: SHIPPED | OUTPATIENT
Start: 2023-08-09

## 2023-08-09 ASSESSMENT — FIBROSIS 4 INDEX: FIB4 SCORE: 0.94

## 2023-08-09 NOTE — ASSESSMENT & PLAN NOTE
Chronic condition. Patient reports that over the last year her period has become very heavy and has severe cramps for the first 3 days. She reports regular cycle. She had not had any recent pregnancies. She has not been on any type of hormones for the last 10 years.     Labs ordered.

## 2023-08-09 NOTE — PROGRESS NOTES
"  Chief Complaint   Patient presents with    [facility]    Medication Refill     Pantroprezol    Bleeding/Bruising     Both legs                                                                                                                                       HPI:   Roxane presents today with the following.    Problem   Dysmenorrhea   Gerd (Gastroesophageal Reflux Disease)       Current Outpatient Medications   Medication Sig Dispense Refill    pantoprazole (PROTONIX) 40 MG Tablet Delayed Response Take 1 Tablet by mouth every day. 90 Tablet 3    SYNTHROID 88 MCG Tab Take 1 Tablet by mouth every morning on an empty stomach. 90 Tablet 3    vitamin D2, Ergocalciferol, (DRISDOL) 1.25 MG (02800 UT) Cap capsule Take 1 Capsule by mouth every 7 days. 12 Capsule 3    ferrous sulfate 325 (65 Fe) MG tablet TAKE ON MONDAY, WEDNESDAY AND FRIDAY. 36 Tablet 4    Cyanocobalamin (B-12 PO) Take  by mouth.       No current facility-administered medications for this visit.       Allergies as of 08/09/2023 - Reviewed 08/09/2023   Allergen Reaction Noted    Iodine Anaphylaxis and Hives 01/11/2013        ROS:  All systems negative expect as addressed in assessment and plan.     BP (!) 86/56 (BP Location: Left arm, Patient Position: Sitting, BP Cuff Size: Adult)   Pulse 84   Temp 36.8 °C (98.2 °F) (Temporal)   Ht 1.676 m (5' 6\")   Wt 60.9 kg (134 lb 3.2 oz)   SpO2 99%   BMI 21.66 kg/m²       Physical Exam  Vitals reviewed.   Constitutional:       Appearance: Normal appearance.   HENT:      Head: Normocephalic and atraumatic.      Mouth/Throat:      Mouth: Mucous membranes are moist.   Eyes:      Extraocular Movements: Extraocular movements intact.      Conjunctiva/sclera: Conjunctivae normal.   Pulmonary:      Effort: Pulmonary effort is normal.   Musculoskeletal:         General: Normal range of motion.      Cervical back: Normal range of motion.   Skin:     General: Skin is warm and dry.   Neurological:      General: No focal " deficit present.      Mental Status: She is alert and oriented to person, place, and time.   Psychiatric:         Mood and Affect: Mood normal.         Behavior: Behavior normal.         Thought Content: Thought content normal.           Assessment and Plan:  38 y.o. female with the following issues.    1. Dysmenorrhea  IRON/TOTAL IRON BIND    VITAMIN B12    FERRITIN    CBC WITH DIFFERENTIAL    VON WILLEBRAND'S PROFILE    Prothrombin Time    APTT      2. Migraine with aura and without status migrainosus, not intractable  IRON/TOTAL IRON BIND    VITAMIN B12    FERRITIN    CBC WITH DIFFERENTIAL      3. Hair loss  IRON/TOTAL IRON BIND    VITAMIN B12    FERRITIN    CBC WITH DIFFERENTIAL      4. Gastroesophageal reflux disease without esophagitis  pantoprazole (PROTONIX) 40 MG Tablet Delayed Response           Dysmenorrhea  Chronic condition. Patient reports that over the last year her period has become very heavy and has severe cramps for the first 3 days. She reports regular cycle. She had not had any recent pregnancies. She has not been on any type of hormones for the last 10 years.     Labs ordered.       GERD (gastroesophageal reflux disease)  Chronic stable. Patient uses pantoprazole 40mg as needed.     Will refill pantoprazole 40mg.       Return for as needed or yearly.      Please note that this dictation was created using voice recognition software. I have worked with consultants from the vendor as well as technical experts from [facility] to optimize the interface. I have made every reasonable attempt to correct obvious errors, but I expect that there are errors of grammar and possibly content that I did not discover before finalizing the note.

## 2023-08-23 ENCOUNTER — HOSPITAL ENCOUNTER (OUTPATIENT)
Dept: LAB | Facility: MEDICAL CENTER | Age: 38
End: 2023-08-23
Attending: NURSE PRACTITIONER
Payer: COMMERCIAL

## 2023-08-23 DIAGNOSIS — G43.109 MIGRAINE WITH AURA AND WITHOUT STATUS MIGRAINOSUS, NOT INTRACTABLE: ICD-10-CM

## 2023-08-23 DIAGNOSIS — L65.9 HAIR LOSS: ICD-10-CM

## 2023-08-23 DIAGNOSIS — N94.6 DYSMENORRHEA: ICD-10-CM

## 2023-08-23 LAB
APTT PPP: 32.2 SEC (ref 24.7–36)
BASOPHILS # BLD AUTO: 0.3 % (ref 0–1.8)
BASOPHILS # BLD: 0.02 K/UL (ref 0–0.12)
EOSINOPHIL # BLD AUTO: 0.04 K/UL (ref 0–0.51)
EOSINOPHIL NFR BLD: 0.5 % (ref 0–6.9)
ERYTHROCYTE [DISTWIDTH] IN BLOOD BY AUTOMATED COUNT: 46.1 FL (ref 35.9–50)
FERRITIN SERPL-MCNC: 69.3 NG/ML (ref 10–291)
HCT VFR BLD AUTO: 45.5 % (ref 37–47)
HGB BLD-MCNC: 14.8 G/DL (ref 12–16)
IMM GRANULOCYTES # BLD AUTO: 0.02 K/UL (ref 0–0.11)
IMM GRANULOCYTES NFR BLD AUTO: 0.3 % (ref 0–0.9)
INR PPP: 1.04 (ref 0.87–1.13)
IRON SATN MFR SERPL: 22 % (ref 15–55)
IRON SERPL-MCNC: 62 UG/DL (ref 40–170)
LYMPHOCYTES # BLD AUTO: 0.88 K/UL (ref 1–4.8)
LYMPHOCYTES NFR BLD: 11.8 % (ref 22–41)
MCH RBC QN AUTO: 32.2 PG (ref 27–33)
MCHC RBC AUTO-ENTMCNC: 32.5 G/DL (ref 32.2–35.5)
MCV RBC AUTO: 98.9 FL (ref 81.4–97.8)
MONOCYTES # BLD AUTO: 0.32 K/UL (ref 0–0.85)
MONOCYTES NFR BLD AUTO: 4.3 % (ref 0–13.4)
NEUTROPHILS # BLD AUTO: 6.2 K/UL (ref 1.82–7.42)
NEUTROPHILS NFR BLD: 82.8 % (ref 44–72)
NRBC # BLD AUTO: 0 K/UL
NRBC BLD-RTO: 0 /100 WBC (ref 0–0.2)
PLATELET # BLD AUTO: 222 K/UL (ref 164–446)
PMV BLD AUTO: 12 FL (ref 9–12.9)
PROTHROMBIN TIME: 13.7 SEC (ref 12–14.6)
RBC # BLD AUTO: 4.6 M/UL (ref 4.2–5.4)
TIBC SERPL-MCNC: 279 UG/DL (ref 250–450)
UIBC SERPL-MCNC: 217 UG/DL (ref 110–370)
VIT B12 SERPL-MCNC: 455 PG/ML (ref 211–911)
WBC # BLD AUTO: 7.5 K/UL (ref 4.8–10.8)

## 2023-08-23 PROCEDURE — 85245 CLOT FACTOR VIII VW RISTOCTN: CPT

## 2023-08-23 PROCEDURE — 36415 COLL VENOUS BLD VENIPUNCTURE: CPT

## 2023-08-23 PROCEDURE — 85246 CLOT FACTOR VIII VW ANTIGEN: CPT

## 2023-08-23 PROCEDURE — 85025 COMPLETE CBC W/AUTO DIFF WBC: CPT

## 2023-08-23 PROCEDURE — 85610 PROTHROMBIN TIME: CPT

## 2023-08-23 PROCEDURE — 82728 ASSAY OF FERRITIN: CPT

## 2023-08-23 PROCEDURE — 82607 VITAMIN B-12: CPT

## 2023-08-23 PROCEDURE — 83540 ASSAY OF IRON: CPT

## 2023-08-23 PROCEDURE — 85730 THROMBOPLASTIN TIME PARTIAL: CPT

## 2023-08-23 PROCEDURE — 83550 IRON BINDING TEST: CPT

## 2023-08-23 PROCEDURE — 85240 CLOT FACTOR VIII AHG 1 STAGE: CPT

## 2023-08-24 NOTE — TELEPHONE ENCOUNTER
Received request via: Pharmacy    Was the patient seen in the last year in this department? Yes    Does the patient have an active prescription (recently filled or refills available) for medication(s) requested? No    98.8

## 2023-08-26 LAB
FACT VIII ACT/NOR PPP: 102 % (ref 56–191)
VWF AG ACT/NOR PPP IA: 126 % (ref 52–214)
VWF:RCO ACT/NOR PPP PL AGG: 160 % (ref 51–215)

## 2023-09-11 ENCOUNTER — HOSPITAL ENCOUNTER (OUTPATIENT)
Dept: LAB | Facility: MEDICAL CENTER | Age: 38
End: 2023-09-11
Attending: INTERNAL MEDICINE
Payer: COMMERCIAL

## 2023-09-11 DIAGNOSIS — E03.9 ACQUIRED HYPOTHYROIDISM: ICD-10-CM

## 2023-09-11 DIAGNOSIS — L65.9 HAIR LOSS: ICD-10-CM

## 2023-09-11 DIAGNOSIS — E55.9 VITAMIN D DEFICIENCY: ICD-10-CM

## 2023-09-11 DIAGNOSIS — E53.8 B12 DEFICIENCY: ICD-10-CM

## 2023-09-11 DIAGNOSIS — E06.3 HASHIMOTO'S THYROIDITIS: ICD-10-CM

## 2023-09-11 LAB
25(OH)D3 SERPL-MCNC: 39 NG/ML (ref 30–100)
ALBUMIN SERPL BCP-MCNC: 4.3 G/DL (ref 3.2–4.9)
ALBUMIN/GLOB SERPL: 1.7 G/DL
ALP SERPL-CCNC: 52 U/L (ref 30–99)
ALT SERPL-CCNC: 11 U/L (ref 2–50)
ANION GAP SERPL CALC-SCNC: 11 MMOL/L (ref 7–16)
AST SERPL-CCNC: 17 U/L (ref 12–45)
BILIRUB SERPL-MCNC: 0.3 MG/DL (ref 0.1–1.5)
BUN SERPL-MCNC: 13 MG/DL (ref 8–22)
CALCIUM ALBUM COR SERPL-MCNC: 8.7 MG/DL (ref 8.5–10.5)
CALCIUM SERPL-MCNC: 8.9 MG/DL (ref 8.4–10.2)
CHLORIDE SERPL-SCNC: 103 MMOL/L (ref 96–112)
CO2 SERPL-SCNC: 25 MMOL/L (ref 20–33)
CREAT SERPL-MCNC: 0.69 MG/DL (ref 0.5–1.4)
FERRITIN SERPL-MCNC: 71.6 NG/ML (ref 10–291)
GFR SERPLBLD CREATININE-BSD FMLA CKD-EPI: 115 ML/MIN/1.73 M 2
GLOBULIN SER CALC-MCNC: 2.6 G/DL (ref 1.9–3.5)
GLUCOSE SERPL-MCNC: 90 MG/DL (ref 65–99)
IRON SATN MFR SERPL: 26 % (ref 15–55)
IRON SERPL-MCNC: 71 UG/DL (ref 40–170)
POTASSIUM SERPL-SCNC: 3.6 MMOL/L (ref 3.6–5.5)
PROT SERPL-MCNC: 6.9 G/DL (ref 6–8.2)
SODIUM SERPL-SCNC: 139 MMOL/L (ref 135–145)
T4 FREE SERPL-MCNC: 1.07 NG/DL (ref 0.93–1.7)
TIBC SERPL-MCNC: 269 UG/DL (ref 250–450)
TSH SERPL DL<=0.005 MIU/L-ACNC: 12.93 UIU/ML (ref 0.38–5.33)
UIBC SERPL-MCNC: 198 UG/DL (ref 110–370)
VIT B12 SERPL-MCNC: 418 PG/ML (ref 211–911)

## 2023-09-11 PROCEDURE — 82728 ASSAY OF FERRITIN: CPT

## 2023-09-11 PROCEDURE — 83550 IRON BINDING TEST: CPT

## 2023-09-11 PROCEDURE — 82306 VITAMIN D 25 HYDROXY: CPT

## 2023-09-11 PROCEDURE — 83540 ASSAY OF IRON: CPT

## 2023-09-11 PROCEDURE — 80053 COMPREHEN METABOLIC PANEL: CPT

## 2023-09-11 PROCEDURE — 84439 ASSAY OF FREE THYROXINE: CPT

## 2023-09-11 PROCEDURE — 36415 COLL VENOUS BLD VENIPUNCTURE: CPT

## 2023-09-11 PROCEDURE — 82607 VITAMIN B-12: CPT

## 2023-09-11 PROCEDURE — 84443 ASSAY THYROID STIM HORMONE: CPT

## 2023-09-14 ENCOUNTER — TELEMEDICINE (OUTPATIENT)
Dept: ENDOCRINOLOGY | Facility: MEDICAL CENTER | Age: 38
End: 2023-09-14
Attending: INTERNAL MEDICINE
Payer: COMMERCIAL

## 2023-09-14 VITALS — WEIGHT: 133 LBS | HEIGHT: 66 IN | BODY MASS INDEX: 21.38 KG/M2

## 2023-09-14 DIAGNOSIS — E55.9 VITAMIN D DEFICIENCY: ICD-10-CM

## 2023-09-14 DIAGNOSIS — E53.8 B12 DEFICIENCY: ICD-10-CM

## 2023-09-14 DIAGNOSIS — E06.3 HASHIMOTO'S THYROIDITIS: ICD-10-CM

## 2023-09-14 DIAGNOSIS — E03.9 ACQUIRED HYPOTHYROIDISM: ICD-10-CM

## 2023-09-14 PROCEDURE — 99214 OFFICE O/P EST MOD 30 MIN: CPT | Mod: 95 | Performed by: INTERNAL MEDICINE

## 2023-09-14 RX ORDER — LEVOTHYROXINE SODIUM 100 MCG
100 TABLET ORAL
Qty: 90 TABLET | Refills: 2 | Status: SHIPPED | OUTPATIENT
Start: 2023-09-14 | End: 2024-02-14 | Stop reason: SDUPTHER

## 2023-09-14 ASSESSMENT — FIBROSIS 4 INDEX: FIB4 SCORE: 0.83

## 2023-09-14 NOTE — PROGRESS NOTES
Chief Complaint: Follow up for Primary Hypothyroidism. Patient was presented for a telehealth consultation via secure and encrypted videoconferencing technology.   This encounter was conducted via Zoom . Verbal consent was obtained. Patient's identity was verified.    Virtual visit consent       This evaluation was conducted via Zoom using secure and encrypted videoconferencing technology.   The patient was (home or other private:[id number]) in the [facility].   The patient's identity was confirmed and verbal consent was obtained for this virtual visit.         HPI:     Roxane Toth is a 38 y.o. female here for follow up of Primary Hypothyroidism.  She was previously a patient of DEYSI Arce    Historically she had palpitations with Synthroid 100 mcg daily  She also had hair loss secondary to iron deficiency    Social history wise, she works at [employer] at the [employer] department      She remains on Synthroid 88mcg daily which has been her dose for over 24  months.   She reports excellent compliance and denies missing any daily doses.   She takes thyroid hormone prior to breakfast.   She  denies taking any iron, calcium supplements or antacids.      She reports insomnia, hair loss  She denies weight gain  She reports fatigue        Latest Reference Range & Units 09/11/23 17:03   25-Hydroxy   Vitamin D 25 30 - 100 ng/mL 39   Ferritin 10.0 - 291.0 ng/mL 71.6   Vitamin B12 -True Cobalamin 211 - 911 pg/mL 418   TSH 0.380 - 5.330 uIU/mL 12.930 (H)   Free T-4 0.93 - 1.70 ng/dL 1.07       Patient's medications, allergies, and social histories were reviewed and updated as appropriate.      ROS:     CONS:     No fever, no chills   EYES:     No diplopia, no blurry vision   CV:           No chest pain, no palpitations   PULM:     No SOB, no cough, no hemoptysis.   GI:            No nausea, no vomiting, no diarrhea, no constipation   ENDO:     No polyuria, no polydipsia, no heat intolerance, no cold  "intolerance       Past Medical History:  Problem List:  2023-08: Dysmenorrhea  2022-01: RUBY (generalized anxiety disorder)  2022-01: GERD (gastroesophageal reflux disease)  2022-01: B12 deficiency  2022-01: Hair loss  2022-01: Non-celiac gluten sensitivity  2020-01: Hashimoto's thyroiditis  2020-01: Vitamin D deficiency  2019-01: Tobacco abuse  2017-05: Hot flashes  2017-02: Dyspepsia  2015-05: Subclinical hypothyroidism  2015-05: Family history of thyroid cancer  2015-03: Hypothyroidism  2015-03: Migraine  2013-10: Abdominal pain, other specified site  2012-04: Health examination of defined subpopulation      Past Surgical History:  Past Surgical History:   Procedure Laterality Date    GASTROSCOPY-ENDO  10/25/2013    Performed by Zohaib Felix M.D. at SURGERY [facility]    EGD WITH ASP/BX  10/25/2013    Performed by Zohaib Felix M.D. at [facility]    ORIF, ELBOW  1991    right elbow    TONSILLECTOMY AND ADENOIDECTOMY  as child        Allergies:  Iodine     Social History:  Social History     Tobacco Use    Smoking status: Former     Current packs/day: 0.00     Average packs/day: 1 pack/day for 17.0 years (17.0 ttl pk-yrs)     Types: Cigarettes     Start date: 1/12/2003     Quit date: 1/12/2020     Years since quitting: 3.6    Smokeless tobacco: Never   Vaping Use    Vaping Use: Never used   Substance Use Topics    Alcohol use: Yes     Comment: occasional    Drug use: Not Currently        Family History:   family history includes Cancer in her maternal grandfather and mother; Hypertension in her maternal grandfather and maternal grandmother; Other in her father; Stroke in her maternal grandfather.      PHYSICAL EXAM:   Vital signs: Ht 1.676 m (5' 6\")   Wt 60.3 kg (133 lb)   LMP 09/13/2023 (Exact Date)   BMI 21.47 kg/m²   GENERAL: Well-developed, well-nourished in no apparent distress.   EYE:  No ocular asymmetry, PERRLA  HENT: Pink, moist mucous membranes.    NECK: Thyroid " is slightly enlarged and feels bosselated  CARDIOVASCULAR:  No murmurs  LUNGS: Clear breath sounds  ABDOMEN: Soft, nontender   EXTREMITIES: No clubbing, cyanosis, or edema.   NEUROLOGICAL: No gross focal motor abnormalities   LYMPH: No cervical adenopathy seen  SKIN: No rashes, lesions.       Labs:  Lab Results   Component Value Date/Time    SODIUM 139 2023 05:03 PM    POTASSIUM 3.6 2023 05:03 PM    CHLORIDE 103 2023 05:03 PM    CO2 25 2023 05:03 PM    ANION 11.0 2023 05:03 PM    GLUCOSE 90 2023 05:03 PM    BUN 13 2023 05:03 PM    CREATININE 0.69 2023 05:03 PM    CREATININE 0.7 2007 09:55 PM    CALCIUM 8.9 2023 05:03 PM    ASTSGOT 17 2023 05:03 PM    ALTSGPT 11 2023 05:03 PM    TBILIRUBIN 0.3 2023 05:03 PM    ALBUMIN 4.3 2023 05:03 PM    TOTPROTEIN 6.9 2023 05:03 PM    GLOBULIN 2.6 2023 05:03 PM    AGRATIO 1.7 2023 05:03 PM       Lab Results   Component Value Date/Time    SODIUM 138 2019 0723    POTASSIUM 3.7 2019 0723    CHLORIDE 105 2019 0723    CO2 25 2019 0723    GLUCOSE 90 2019 0707    BUN 10 2019 0723    CREATININE 0.73 2019 0723    CALCIUM 9.8 2019 0723    ANION 8.0 2019 0723       Lab Results   Component Value Date/Time    CHOLSTRLTOT 147 2019 0707    TRIGLYCERIDE 47 2019 0707    HDL 60 2019 0707    LDL 78 2019 0707       Lab Results   Component Value Date/Time    TSHULTRASEN 0.540 2020 1730     Lab Results   Component Value Date/Time    FREET4 1.18 2020 1730     Lab Results   Component Value Date/Time    FREET3 3.40 2019 1726     No results found for: THYSTIMIG    Lab Results   Component Value Date/Time    MICROSOMALA 1556.9 (H) 2015 1043         Imagin2017 4:23 PM     HISTORY/REASON FOR EXAM:  Hyperthyroidism and history of heterogeneous thyroid        TECHNIQUE/EXAM DESCRIPTION:  Ultrasound of  the soft tissues of the head and neck.     COMPARISON:  06/01/2015     FINDINGS:  The thyroid gland is heterogeneous.  Vascularity is normal.     The right lobe of the thyroid gland measures 1.40 cm x 3.33 cm x 1.41 cm.  The left lobe of the thyroid gland measures 1.16 cm x 3.48 cm x 1.11 cm.  The isthmus measures 0.24 cm.           IMPRESSION:     1.  Thyroid gland again appears heterogeneous with appearance that is unchanged compared to the prior exam.     2.  No mass or nodule has developed since the prior exam.        ASSESSMENT/PLAN:     1. Acquired hypothyroidism  uncontrolled  Increase  Synthroid 100 MCG daily  Reviewed proper administration of thyroid hormone  Patient should take thyroid hormone 30-60 minutes before breakfast on an empty stomach plain water and not take it together with food, iron, calcium, and antacids.  Iron, calcium, and antacids should be taken at least 4 hours apart from thyroid hormone.  RTC in Feb 2024 but repeat labs in 2-3 months to check for stability     2. Hashimoto's thyroiditis  This is the etiology of her hypothyroidism  As proven by her elevated TPO antibodies of 1500 from May 2015    3. Vitamin D deficiency  Stable   Vitamin D labs were reviewed with patient  Continue current supplements  Continue monitoring levels       4. B12 deficiency  Controlled  Continue B12 sublingual 1000 mcg daily  Continue monitoring      Return in about 5 months (around 2/14/2024).       Thank you kindly for allowing me to participate in the thyroid care plan for this patient.    David Schmitz MD, FACE, [facility]      CC:   JOSEPH Goodman

## 2023-09-27 ENCOUNTER — APPOINTMENT (OUTPATIENT)
Dept: RADIOLOGY | Facility: IMAGING CENTER | Age: 38
End: 2023-09-27
Attending: REGISTERED NURSE
Payer: COMMERCIAL

## 2023-09-27 ENCOUNTER — OFFICE VISIT (OUTPATIENT)
Dept: URGENT CARE | Facility: CLINIC | Age: 38
End: 2023-09-27
Payer: COMMERCIAL

## 2023-09-27 VITALS
DIASTOLIC BLOOD PRESSURE: 60 MMHG | TEMPERATURE: 98.1 F | RESPIRATION RATE: 16 BRPM | BODY MASS INDEX: 21.86 KG/M2 | OXYGEN SATURATION: 100 % | HEIGHT: 66 IN | HEART RATE: 70 BPM | WEIGHT: 136 LBS | SYSTOLIC BLOOD PRESSURE: 100 MMHG

## 2023-09-27 DIAGNOSIS — M53.3 TAIL BONE PAIN: ICD-10-CM

## 2023-09-27 DIAGNOSIS — S30.0XXA CONTUSION OF SACRUM, INITIAL ENCOUNTER: ICD-10-CM

## 2023-09-27 PROCEDURE — 3078F DIAST BP <80 MM HG: CPT | Performed by: REGISTERED NURSE

## 2023-09-27 PROCEDURE — 3074F SYST BP LT 130 MM HG: CPT | Performed by: REGISTERED NURSE

## 2023-09-27 PROCEDURE — 72220 X-RAY EXAM SACRUM TAILBONE: CPT | Mod: TC,FY | Performed by: RADIOLOGY

## 2023-09-27 PROCEDURE — 99213 OFFICE O/P EST LOW 20 MIN: CPT | Performed by: REGISTERED NURSE

## 2023-09-27 RX ORDER — KETOROLAC TROMETHAMINE 30 MG/ML
30 INJECTION, SOLUTION INTRAMUSCULAR; INTRAVENOUS ONCE
Status: COMPLETED | OUTPATIENT
Start: 2023-09-27 | End: 2023-09-27

## 2023-09-27 RX ADMIN — KETOROLAC TROMETHAMINE 30 MG: 30 INJECTION, SOLUTION INTRAMUSCULAR; INTRAVENOUS at 14:06

## 2023-09-27 ASSESSMENT — ENCOUNTER SYMPTOMS
DIZZINESS: 0
SHORTNESS OF BREATH: 0
FEVER: 0
BLOOD IN STOOL: 0
ABDOMINAL PAIN: 0

## 2023-09-27 ASSESSMENT — FIBROSIS 4 INDEX: FIB4 SCORE: 0.83

## 2023-09-27 NOTE — PROGRESS NOTES
"Subjective:   Roxane Mojica is a 38 y.o. female who presents for Back Pain (\"I fell on Sunday. Landing on a baby gate. Now my spine and pelvic hurt.\")      HPI  3 days ago fell backwards landing on baby gate, no having pain around tailbone and lower lumbar region. No prior back/tailbone injuries. Taking tylenol for symptoms.  No underlying bone disorders, no history of frequent fractures.    Denies recent surgery, new onset numbness tingling or weakness in lower extremities, saddle paresthesia, incontinence, urinary retention       Review of Systems   Constitutional:  Negative for fever.   Respiratory:  Negative for shortness of breath.    Cardiovascular:  Negative for chest pain.   Gastrointestinal:  Negative for abdominal pain, blood in stool and melena.   Skin:  Negative for rash.   Neurological:  Negative for dizziness.       Medications, Allergies, and current problem list reviewed today in Epic.     Objective:     /60 (BP Location: Right arm, Patient Position: Sitting, BP Cuff Size: Adult)   Pulse 70   Temp 36.7 °C (98.1 °F) (Temporal)   Resp 16   Ht 1.676 m (5' 6\")   Wt 61.7 kg (136 lb)   SpO2 100%     Physical Exam  Vitals and nursing note reviewed.   Constitutional:       General: She is not in acute distress.     Appearance: Normal appearance. She is well-developed. She is not ill-appearing, toxic-appearing or diaphoretic.   HENT:      Head: Normocephalic and atraumatic.      Right Ear: Hearing and external ear normal.      Left Ear: Hearing and external ear normal.      Nose: Nose normal.   Eyes:      Conjunctiva/sclera: Conjunctivae normal.   Cardiovascular:      Rate and Rhythm: Normal rate and regular rhythm.      Heart sounds: Normal heart sounds.   Pulmonary:      Effort: Pulmonary effort is normal. No respiratory distress.      Breath sounds: Normal breath sounds. No wheezing.   Musculoskeletal:      Cervical back: Normal range of motion and neck supple.        Back:    Skin:     " General: Skin is warm and dry.   Neurological:      General: No focal deficit present.      Mental Status: She is alert and oriented to person, place, and time.   Psychiatric:         Mood and Affect: Mood normal.         Behavior: Behavior normal.         Thought Content: Thought content normal.         Judgment: Judgment normal.         RADIOLOGY RESULTS   DX-SACRUM AND COCCYX 2+    Result Date: 9/27/2023 9/27/2023 2:01 PM HISTORY/REASON FOR EXAM:  Pain Following Trauma. TECHNIQUE/EXAM DESCRIPTION AND NUMBER OF VIEWS:  3 views of the sacrum and coccyx. COMPARISON: None. FINDINGS: There are no fractures or dislocations.  No blastic or lytic lesions.  The sacral neural arches are intact.     Negative sacrum and coccyx exam.           Assessment/Plan:     Diagnosis and associated orders:     1. Tail bone pain  DX-SACRUM AND COCCYX 2+    ketorolac (Toradol) injection 30 mg           Comments/MDM:   Differential diagnosis discussed     Pleasant 38-year-old female who fell backwards onto a baby gate 3 days ago, has had tailbone pain and some bruising.  No red flag signs or symptoms.  Has been using Tylenol which provides minimal relief.  No prior injuries to this area, no history of frequent fractures.  No osteoporosis.  Vital signs within normal limits.  On exam she does have some tenderness to the sacrum region with some bruising, no crepitus.  Lower extremity strength is equal, distal neurovascular intact normal DTRs.  I did review the x-ray which is negative for acute fracture or osseous abnormality.  30 mg Toradol given IM in clinic.  Discussed ice, sitting on a donut, frequent standing to avoid constant pressure.  Reviewed signs symptoms that require ER attention.  Follow-up with primary care.    Return to clinic or go to ED if symptoms worsen or persist. Indications for ED discussed at length. Patient/Parent/Guardian voices understanding. Follow-up with your primary care provider in 3-5 days. Red flag symptoms  discussed. All side effects of medication discussed including allergic response, GI upset, tendon injury, rash, sedation etc.    I personally reviewed prior external notes and test results pertinent to today's visit as well as additional imaging and testing completed in clinic today.     Please note that this dictation was created using voice recognition software. I have made every reasonable attempt to correct obvious errors, but I expect that there are errors of grammar and possibly content that I did not discover before finalizing the note.    This note was electronically signed by CARSON Baker

## 2023-09-28 ENCOUNTER — IMMUNIZATION (OUTPATIENT)
Dept: OCCUPATIONAL MEDICINE | Facility: CLINIC | Age: 38
End: 2023-09-28

## 2023-09-28 DIAGNOSIS — Z23 NEED FOR VACCINATION: Primary | ICD-10-CM

## 2023-09-28 PROCEDURE — 90686 IIV4 VACC NO PRSV 0.5 ML IM: CPT | Performed by: NURSE PRACTITIONER

## 2023-10-05 ENCOUNTER — OFFICE VISIT (OUTPATIENT)
Dept: URGENT CARE | Facility: CLINIC | Age: 38
End: 2023-10-05
Payer: COMMERCIAL

## 2023-10-05 ENCOUNTER — HOSPITAL ENCOUNTER (OUTPATIENT)
Facility: MEDICAL CENTER | Age: 38
End: 2023-10-05
Attending: NURSE PRACTITIONER
Payer: COMMERCIAL

## 2023-10-05 VITALS
OXYGEN SATURATION: 98 % | WEIGHT: 133 LBS | HEIGHT: 66 IN | BODY MASS INDEX: 21.38 KG/M2 | TEMPERATURE: 98.2 F | DIASTOLIC BLOOD PRESSURE: 62 MMHG | HEART RATE: 88 BPM | RESPIRATION RATE: 14 BRPM | SYSTOLIC BLOOD PRESSURE: 116 MMHG

## 2023-10-05 DIAGNOSIS — M54.50 ACUTE LOW BACK PAIN, UNSPECIFIED BACK PAIN LATERALITY, UNSPECIFIED WHETHER SCIATICA PRESENT: ICD-10-CM

## 2023-10-05 DIAGNOSIS — R30.0 DYSURIA: ICD-10-CM

## 2023-10-05 DIAGNOSIS — Z87.440 HISTORY OF UTI: ICD-10-CM

## 2023-10-05 DIAGNOSIS — R35.0 URINARY FREQUENCY: ICD-10-CM

## 2023-10-05 LAB
APPEARANCE UR: CLEAR
BILIRUB UR STRIP-MCNC: NEGATIVE MG/DL
COLOR UR AUTO: YELLOW
GLUCOSE UR STRIP.AUTO-MCNC: NEGATIVE MG/DL
KETONES UR STRIP.AUTO-MCNC: NEGATIVE MG/DL
LEUKOCYTE ESTERASE UR QL STRIP.AUTO: NEGATIVE
NITRITE UR QL STRIP.AUTO: NEGATIVE
PH UR STRIP.AUTO: 6 [PH] (ref 5–8)
POCT INT CON NEG: NEGATIVE
POCT INT CON POS: POSITIVE
POCT URINE PREGNANCY TEST: NEGATIVE
PROT UR QL STRIP: 30 MG/DL
RBC UR QL AUTO: NEGATIVE
SP GR UR STRIP.AUTO: >=1.03
UROBILINOGEN UR STRIP-MCNC: 0.2 MG/DL

## 2023-10-05 PROCEDURE — 3078F DIAST BP <80 MM HG: CPT | Performed by: NURSE PRACTITIONER

## 2023-10-05 PROCEDURE — 81025 URINE PREGNANCY TEST: CPT | Performed by: NURSE PRACTITIONER

## 2023-10-05 PROCEDURE — 99213 OFFICE O/P EST LOW 20 MIN: CPT | Performed by: NURSE PRACTITIONER

## 2023-10-05 PROCEDURE — 87086 URINE CULTURE/COLONY COUNT: CPT

## 2023-10-05 PROCEDURE — 81002 URINALYSIS NONAUTO W/O SCOPE: CPT | Performed by: NURSE PRACTITIONER

## 2023-10-05 PROCEDURE — 3074F SYST BP LT 130 MM HG: CPT | Performed by: NURSE PRACTITIONER

## 2023-10-05 RX ORDER — NITROFURANTOIN 25; 75 MG/1; MG/1
100 CAPSULE ORAL 2 TIMES DAILY
Qty: 10 CAPSULE | Refills: 0 | Status: SHIPPED | OUTPATIENT
Start: 2023-10-05 | End: 2023-10-10

## 2023-10-05 RX ORDER — PHENAZOPYRIDINE HYDROCHLORIDE 200 MG/1
200 TABLET, FILM COATED ORAL 3 TIMES DAILY PRN
Qty: 6 TABLET | Refills: 0 | Status: SHIPPED | OUTPATIENT
Start: 2023-10-05 | End: 2023-10-07

## 2023-10-05 ASSESSMENT — FIBROSIS 4 INDEX: FIB4 SCORE: 0.83

## 2023-10-05 NOTE — PROGRESS NOTES
Roxane Mojica is a 38 y.o. female who presents for UTI (X 1 day, burning, frequency, lower back pain.)      HPI  This is a new problem. Roxane Mojica is a 38 y.o. patient who presents to urgent care with c/o: UTI sx for 1 day. Hx uti and pyelonephritis. Having urinary frequency and urgency with burning. She started to have low back ache today.  Has been taking Otc AZO without relief. Denies fever, chills, flank pain. No unusual activity. Denies vaginal sx. No other aggravating or alleviating factors.           ROS See HPI    Allergies:       Allergies   Allergen Reactions    Iodine Anaphylaxis and Hives       PMSFS Hx:  Past Medical History:   Diagnosis Date    Anxiety     Heart burn     Indigestion     Renal disorder 01/01/2011    kidney infection    Thyroid disease      Past Surgical History:   Procedure Laterality Date    GASTROSCOPY-ENDO  10/25/2013    Performed by Zohaib Felix M.D. at SURGERY [facility]    EGD WITH ASP/BX  10/25/2013    Performed by Zohaib Felix M.D. at [facility]    ORIF, ELBOW  1991    right elbow    TONSILLECTOMY AND ADENOIDECTOMY  as child     Family History   Problem Relation Age of Onset    Cancer Mother         thyroid ca    Other Father         healthy    Hypertension Maternal Grandmother     Cancer Maternal Grandfather         leukemia    Hypertension Maternal Grandfather     Stroke Maternal Grandfather     Diabetes Neg Hx      Social History     Tobacco Use    Smoking status: Former     Current packs/day: 0.00     Average packs/day: 1 pack/day for 17.0 years (17.0 ttl pk-yrs)     Types: Cigarettes     Start date: 1/12/2003     Quit date: 1/12/2020     Years since quitting: 3.7    Smokeless tobacco: Never   Substance Use Topics    Alcohol use: Yes     Comment: occasional       Problems:   Patient Active Problem List   Diagnosis    Hypothyroidism    Migraine    Family history of thyroid cancer    Hot flashes    Hashimoto's  "thyroiditis    Vitamin D deficiency    RUBY (generalized anxiety disorder)    GERD (gastroesophageal reflux disease)    B12 deficiency    Hair loss    Non-celiac gluten sensitivity    Dysmenorrhea       Medications:   Current Outpatient Medications on File Prior to Visit   Medication Sig Dispense Refill    SYNTHROID 100 MCG Tab Take 1 Tablet by mouth every morning on an empty stomach. 90 Tablet 2    pantoprazole (PROTONIX) 40 MG Tablet Delayed Response Take 1 Tablet by mouth every day. 90 Tablet 3    vitamin D2, Ergocalciferol, (DRISDOL) 1.25 MG (80301 UT) Cap capsule Take 1 Capsule by mouth every 7 days. 12 Capsule 3    ferrous sulfate 325 (65 Fe) MG tablet TAKE ON MONDAY, WEDNESDAY AND FRIDAY. 36 Tablet 4     No current facility-administered medications on file prior to visit.          Objective:     /62   Pulse 88   Temp 36.8 °C (98.2 °F) (Temporal)   Resp 14   Ht 1.676 m (5' 6\")   Wt 60.3 kg (133 lb)   LMP 09/13/2023   SpO2 98%   BMI 21.47 kg/m²     Physical Exam  Vitals and nursing note reviewed.   Constitutional:       General: She is not in acute distress.     Appearance: Normal appearance. She is well-developed. She is not ill-appearing or toxic-appearing.   Cardiovascular:      Rate and Rhythm: Normal rate.   Pulmonary:      Effort: Pulmonary effort is normal.   Abdominal:      Palpations: Abdomen is soft. Abdomen is not rigid.      Tenderness: There is no right CVA tenderness or left CVA tenderness.   Musculoskeletal:      Lumbar back: Normal.   Skin:     General: Skin is warm and dry.      Capillary Refill: Capillary refill takes less than 2 seconds.   Neurological:      Mental Status: She is alert and oriented to person, place, and time.   Psychiatric:         Mood and Affect: Mood normal.         Behavior: Behavior normal. Behavior is cooperative.         Thought Content: Thought content normal.         Results for orders placed or performed in visit on 10/05/23   POCT Urinalysis   Result " Value Ref Range    POC Color Yellow Negative    POC Appearance Clear Negative    POC Glucose Negative Negative mg/dL    POC Bilirubin Negative Negative mg/dL    POC Ketones Negative Negative mg/dL    POC Specific Gravity >=1.030 <1.005 - >1.030    POC Blood Negative Negative    POC Urine PH 6.0 5.0 - 8.0    POC Protein 30 Negative mg/dL    POC Urobiligen 0.2 Negative (0.2) mg/dL    POC Nitrites Negative Negative    POC Leukocyte Esterase Negative Negative     EPT: negative     Assessment /Associated Orders:      1. Dysuria  POCT Urinalysis    URINE CULTURE(NEW)    phenazopyridine (PYRIDIUM) 200 MG Tab    nitrofurantoin (MACROBID) 100 MG Cap      2. Urinary frequency  nitrofurantoin (MACROBID) 100 MG Cap      3. Acute low back pain, unspecified back pain laterality, unspecified whether sciatica present        4. History of UTI  nitrofurantoin (MACROBID) 100 MG Cap          Medical Decision Making:      Pt is clinically stable at today's acute urgent care visit.  No acute distress noted.  VSS. Appropriate for outpatient care at this time.   Acute problem today with uncertain prognosis. Previous hx of uti and pyelonephritis. Feels the same. No systemic sx. Is going OOT this weekend and would like to start antibiotics and will stop them if Urine culture is negative.   Educated in proper administration of  prescription medication(s) ordered today including safety, possible SE, risks, benefits, rationale and alternatives to therapy.   Keep well hydrated  Urine culture: pending     Discussed Dx, management options (risks,benefits, and alternatives to planned treatment), natural progression and supportive care.  Expressed understanding and the treatment plan was agreed upon.   Questions were encouraged and answered   Return to urgent care prn if new or worsening sx or if there is no improvement in condition prn.    Educated in Red flags and indications to immediately call 911 or present to the Emergency Department.            Please note that this dictation was created using voice recognition software. I have worked with consultants from the vendor as well as technical experts from [facility] to optimize the interface. I have made every reasonable attempt to correct obvious errors, but I expect that there are errors of grammar and possibly content that I did not discover before finalizing the note.  This note was electronically signed by provider

## 2023-10-06 DIAGNOSIS — R30.0 DYSURIA: ICD-10-CM

## 2023-10-08 LAB
BACTERIA UR CULT: NORMAL
SIGNIFICANT IND 70042: NORMAL
SITE SITE: NORMAL
SOURCE SOURCE: NORMAL

## 2023-10-23 ENCOUNTER — OFFICE VISIT (OUTPATIENT)
Dept: MEDICAL GROUP | Facility: PHYSICIAN GROUP | Age: 38
End: 2023-10-23
Payer: COMMERCIAL

## 2023-10-23 VITALS
WEIGHT: 128 LBS | TEMPERATURE: 97.4 F | HEART RATE: 104 BPM | BODY MASS INDEX: 20.57 KG/M2 | OXYGEN SATURATION: 93 % | RESPIRATION RATE: 14 BRPM | SYSTOLIC BLOOD PRESSURE: 116 MMHG | HEIGHT: 66 IN | DIASTOLIC BLOOD PRESSURE: 66 MMHG

## 2023-10-23 DIAGNOSIS — R10.9 FLANK PAIN: ICD-10-CM

## 2023-10-23 LAB
APPEARANCE UR: NORMAL
BILIRUB UR STRIP-MCNC: NORMAL MG/DL
COLOR UR AUTO: NORMAL
GLUCOSE UR STRIP.AUTO-MCNC: NORMAL MG/DL
KETONES UR STRIP.AUTO-MCNC: NORMAL MG/DL
LEUKOCYTE ESTERASE UR QL STRIP.AUTO: NORMAL
NITRITE UR QL STRIP.AUTO: NORMAL
PH UR STRIP.AUTO: 7 [PH] (ref 5–8)
PROT UR QL STRIP: NORMAL MG/DL
RBC UR QL AUTO: NORMAL
SP GR UR STRIP.AUTO: 1.02
UROBILINOGEN UR STRIP-MCNC: 0.2 MG/DL

## 2023-10-23 PROCEDURE — 81002 URINALYSIS NONAUTO W/O SCOPE: CPT | Performed by: FAMILY MEDICINE

## 2023-10-23 PROCEDURE — 3078F DIAST BP <80 MM HG: CPT | Performed by: FAMILY MEDICINE

## 2023-10-23 PROCEDURE — 3074F SYST BP LT 130 MM HG: CPT | Performed by: FAMILY MEDICINE

## 2023-10-23 PROCEDURE — 99213 OFFICE O/P EST LOW 20 MIN: CPT | Performed by: FAMILY MEDICINE

## 2023-10-23 ASSESSMENT — FIBROSIS 4 INDEX: FIB4 SCORE: 0.83

## 2023-10-23 NOTE — PROGRESS NOTES
"CHIEF COMPLAINT / REASON FOR VISIT  Roxane Mojica is a 38 y.o. female with history of hypothyroidism that presents today for flank pain    HISTORY OF PRESENT ILLNESS  Was seen in urgent care on 10/5/2023 with concerns of possible UTI, was prescribed Macrobid.  Urinalysis and culture were negative, urine pregnancy test is negative.  Reports some associated stomach burning sensation, has taken for the past couple days, helps somewhat. Denies NSAID use  Reports history of pyelonephritis years ago, and this feels similar.     OBJECTIVE     /66 (BP Location: Left arm, Patient Position: Sitting, BP Cuff Size: Adult)   Pulse (!) 104   Temp 36.3 °C (97.4 °F) (Temporal)   Resp 14   Ht 1.676 m (5' 6\")   Wt 58.1 kg (128 lb)   LMP 09/13/2023   SpO2 93%   BMI 20.66 kg/m²      PHYSICAL EXAM  Constitutional: Sitting comfortably, in no acute distress, responds to questions appropriately.  Back: No CVA tenderness to percussion, mild tenderness to palpation of left lumbar paraspinal musculature.  Straight leg raise negative bilaterally  Skin: Warm and dry, no rashes or lesions on face or exposed upper extremities    ASSESSMENT & PLAN  1. Flank pain  Suspect probable lumbar myofascial pain.  Patient is concerned about her kidneys so it is reasonable to obtain a renal ultrasound.  Point-of-care urinalysis today is negative for leukocytes, nitrates, or blood.  If renal ultrasound comes back normal would recommend physical therapy if pain is still present  - POCT Urinalysis  - US-RENAL; Future        "

## 2023-10-30 ENCOUNTER — HOSPITAL ENCOUNTER (OUTPATIENT)
Dept: RADIOLOGY | Facility: MEDICAL CENTER | Age: 38
End: 2023-10-30
Attending: FAMILY MEDICINE
Payer: COMMERCIAL

## 2023-10-30 DIAGNOSIS — R10.9 FLANK PAIN: ICD-10-CM

## 2023-10-30 PROCEDURE — 76775 US EXAM ABDO BACK WALL LIM: CPT

## 2023-11-11 ENCOUNTER — HOSPITAL ENCOUNTER (OUTPATIENT)
Dept: LAB | Facility: MEDICAL CENTER | Age: 38
End: 2023-11-11
Attending: INTERNAL MEDICINE
Payer: COMMERCIAL

## 2023-11-11 DIAGNOSIS — E06.3 HASHIMOTO'S THYROIDITIS: ICD-10-CM

## 2023-11-11 DIAGNOSIS — E03.9 ACQUIRED HYPOTHYROIDISM: ICD-10-CM

## 2023-11-11 LAB
T4 FREE SERPL-MCNC: 1.79 NG/DL (ref 0.93–1.7)
TSH SERPL DL<=0.005 MIU/L-ACNC: 1.27 UIU/ML (ref 0.38–5.33)

## 2023-11-11 PROCEDURE — 84439 ASSAY OF FREE THYROXINE: CPT

## 2023-11-11 PROCEDURE — 36415 COLL VENOUS BLD VENIPUNCTURE: CPT

## 2023-11-11 PROCEDURE — 84443 ASSAY THYROID STIM HORMONE: CPT

## 2023-11-14 ENCOUNTER — OFFICE VISIT (OUTPATIENT)
Dept: ENDOCRINOLOGY | Facility: MEDICAL CENTER | Age: 38
End: 2023-11-14
Attending: INTERNAL MEDICINE
Payer: COMMERCIAL

## 2023-11-14 VITALS
HEIGHT: 66 IN | WEIGHT: 136.6 LBS | DIASTOLIC BLOOD PRESSURE: 62 MMHG | SYSTOLIC BLOOD PRESSURE: 102 MMHG | RESPIRATION RATE: 20 BRPM | HEART RATE: 105 BPM | BODY MASS INDEX: 21.95 KG/M2 | OXYGEN SATURATION: 95 %

## 2023-11-14 DIAGNOSIS — E06.3 HASHIMOTO'S THYROIDITIS: ICD-10-CM

## 2023-11-14 DIAGNOSIS — L65.9 HAIR LOSS: ICD-10-CM

## 2023-11-14 DIAGNOSIS — E61.1 LOW IRON: ICD-10-CM

## 2023-11-14 DIAGNOSIS — E53.8 B12 DEFICIENCY: ICD-10-CM

## 2023-11-14 DIAGNOSIS — E03.9 ACQUIRED HYPOTHYROIDISM: ICD-10-CM

## 2023-11-14 DIAGNOSIS — E55.9 VITAMIN D DEFICIENCY: ICD-10-CM

## 2023-11-14 PROCEDURE — 3078F DIAST BP <80 MM HG: CPT | Performed by: INTERNAL MEDICINE

## 2023-11-14 PROCEDURE — 99214 OFFICE O/P EST MOD 30 MIN: CPT | Performed by: INTERNAL MEDICINE

## 2023-11-14 PROCEDURE — 99211 OFF/OP EST MAY X REQ PHY/QHP: CPT | Performed by: INTERNAL MEDICINE

## 2023-11-14 PROCEDURE — 3074F SYST BP LT 130 MM HG: CPT | Performed by: INTERNAL MEDICINE

## 2023-11-14 ASSESSMENT — FIBROSIS 4 INDEX: FIB4 SCORE: 0.83

## 2023-11-14 NOTE — PROGRESS NOTES
Chief Complaint: Follow up for Primary Hypothyroidism. Patient was presented for a telehealth consultation via secure and encrypted videoconferencing technology.           HPI:     Roxane Toth is a 38 y.o. female here for follow up of Primary Hypothyroidism.  She was previously a patient of DEYSI Arce    Historically she had palpitations with Synthroid 100 mcg daily and initially did well on Synthroid 88 mcg daily until she had a TSH of 12 on September 2023  She also had hair loss secondary to iron deficiency  Social history wise, she works at [employer] at the [employer] department      She remains on Synthroid 100mcg daily which has been her dose for over 6  months.   She reports excellent compliance and denies missing any daily doses.   She takes thyroid hormone prior to breakfast.   She  denies taking any iron, calcium supplements or antacids.      She denies weight gain  She reports her fatigue is resolved  Her insomnia is gone  She still has hair loss but she thinks its from iron deficiency  She does report heavy periods which last for 5 days   She reports regular cycles     Latest Reference Range & Units 11/11/23 10:22   TSH 0.380 - 5.330 uIU/mL 1.270   Free T-4 0.93 - 1.70 ng/dL 1.79 (H)        Latest Reference Range & Units 09/11/23 17:03   25-Hydroxy   Vitamin D 25 30 - 100 ng/mL 39   Ferritin 10.0 - 291.0 ng/mL 71.6   Vitamin B12 -True Cobalamin 211 - 911 pg/mL 418   TSH 0.380 - 5.330 uIU/mL 12.930 (H)   Free T-4 0.93 - 1.70 ng/dL 1.07         Patient's medications, allergies, and social histories were reviewed and updated as appropriate.      ROS:     CONS:     No fever, no chills   EYES:     No diplopia, no blurry vision   CV:           No chest pain, no palpitations   PULM:     No SOB, no cough, no hemoptysis.   GI:            No nausea, no vomiting, no diarrhea, no constipation   ENDO:     No polyuria, no polydipsia, no heat intolerance, no cold intolerance       Past Medical  "History:  Problem List:  2023-08: Dysmenorrhea  2022-01: RUBY (generalized anxiety disorder)  2022-01: GERD (gastroesophageal reflux disease)  2022-01: B12 deficiency  2022-01: Hair loss  2022-01: Non-celiac gluten sensitivity  2020-01: Hashimoto's thyroiditis  2020-01: Vitamin D deficiency  2019-01: Tobacco abuse  2017-05: Hot flashes  2017-02: Dyspepsia  2015-05: Subclinical hypothyroidism  2015-05: Family history of thyroid cancer  2015-03: Hypothyroidism  2015-03: Migraine  2013-10: Abdominal pain, other specified site  2012-04: Health examination of defined subpopulation      Past Surgical History:  Past Surgical History:   Procedure Laterality Date    GASTROSCOPY-ENDO  10/25/2013    Performed by Zohaib Felix M.D. at SURGERY [facility]    EGD WITH ASP/BX  10/25/2013    Performed by Zohaib Felix M.D. at [facility]    ORIF, ELBOW  1991    right elbow    TONSILLECTOMY AND ADENOIDECTOMY  as child        Allergies:  Iodine     Social History:  Social History     Tobacco Use    Smoking status: Former     Current packs/day: 0.00     Average packs/day: 1 pack/day for 17.0 years (17.0 ttl pk-yrs)     Types: Cigarettes     Start date: 1/12/2003     Quit date: 1/12/2020     Years since quitting: 3.8    Smokeless tobacco: Never   Vaping Use    Vaping Use: Never used   Substance Use Topics    Alcohol use: Yes     Comment: occasional    Drug use: Not Currently        Family History:   family history includes Cancer in her maternal grandfather and mother; Hypertension in her maternal grandfather and maternal grandmother; Other in her father; Stroke in her maternal grandfather.      PHYSICAL EXAM:   Vital signs: /62 (BP Location: Right arm, Patient Position: Sitting, BP Cuff Size: Adult)   Pulse (!) 105   Resp 20   Ht 1.676 m (5' 6\")   Wt 62 kg (136 lb 9.6 oz)   LMP 11/12/2023 (Exact Date)   SpO2 95%   BMI 22.05 kg/m²   GENERAL: Well-developed, well-nourished in no apparent " distress.   EYE:  No ocular asymmetry, PERRLA  HENT: Pink, moist mucous membranes.    NECK: Thyroid is slightly enlarged and feels bosselated  CARDIOVASCULAR:  No murmurs  LUNGS: Clear breath sounds  ABDOMEN: Soft, nontender   EXTREMITIES: No clubbing, cyanosis, or edema.   NEUROLOGICAL: No gross focal motor abnormalities   LYMPH: No cervical adenopathy seen  SKIN: No rashes, lesions.       Labs:  Lab Results   Component Value Date/Time    SODIUM 139 2023 05:03 PM    POTASSIUM 3.6 2023 05:03 PM    CHLORIDE 103 2023 05:03 PM    CO2 25 2023 05:03 PM    ANION 11.0 2023 05:03 PM    GLUCOSE 90 2023 05:03 PM    BUN 13 2023 05:03 PM    CREATININE 0.69 2023 05:03 PM    CREATININE 0.7 2007 09:55 PM    CALCIUM 8.9 2023 05:03 PM    ASTSGOT 17 2023 05:03 PM    ALTSGPT 11 2023 05:03 PM    TBILIRUBIN 0.3 2023 05:03 PM    ALBUMIN 4.3 2023 05:03 PM    TOTPROTEIN 6.9 2023 05:03 PM    GLOBULIN 2.6 2023 05:03 PM    AGRATIO 1.7 2023 05:03 PM       Lab Results   Component Value Date/Time    SODIUM 138 2019 0723    POTASSIUM 3.7 2019 0723    CHLORIDE 105 2019 0723    CO2 25 2019 0723    GLUCOSE 90 2019 0707    BUN 10 2019 0723    CREATININE 0.73 2019 0723    CALCIUM 9.8 2019 0723    ANION 8.0 2019 0723       Lab Results   Component Value Date/Time    CHOLSTRLTOT 147 2019 0707    TRIGLYCERIDE 47 2019 0707    HDL 60 2019 0707    LDL 78 2019 0707       Lab Results   Component Value Date/Time    TSHULTRASEN 0.540 2020 1730     Lab Results   Component Value Date/Time    FREET4 1.18 2020 1730     Lab Results   Component Value Date/Time    FREET3 3.40 2019 1726     No results found for: THYSTIMIG    Lab Results   Component Value Date/Time    MICROSOMALA 1556.9 (H) 2015 1043         Imagin2017 4:23 PM     HISTORY/REASON FOR EXAM:   Hyperthyroidism and history of heterogeneous thyroid        TECHNIQUE/EXAM DESCRIPTION:  Ultrasound of the soft tissues of the head and neck.     COMPARISON:  06/01/2015     FINDINGS:  The thyroid gland is heterogeneous.  Vascularity is normal.     The right lobe of the thyroid gland measures 1.40 cm x 3.33 cm x 1.41 cm.  The left lobe of the thyroid gland measures 1.16 cm x 3.48 cm x 1.11 cm.  The isthmus measures 0.24 cm.           IMPRESSION:     1.  Thyroid gland again appears heterogeneous with appearance that is unchanged compared to the prior exam.     2.  No mass or nodule has developed since the prior exam.        ASSESSMENT/PLAN:     1. Acquired hypothyroidism  Improved control  Continue Synthroid 100 MCG daily  Reviewed proper administration of thyroid hormone  Patient should take thyroid hormone 30-60 minutes before breakfast on an empty stomach plain water and not take it together with food, iron, calcium, and antacids.  Follow-up in February as originally scheduled    2. Hashimoto's thyroiditis  This is the etiology of her hypothyroidism  As proven by her elevated TPO antibodies of 1500 from May 2015    3. Vitamin D deficiency  Stable   Vitamin D labs were reviewed with patient  Continue current supplements  Continue monitoring levels       4. B12 deficiency  Controlled  Continue B12 sublingual 1000 mcg daily  Continue monitoring    5. Hair loss  Unstable because of hair loss I am going to check her iron levels again with her next labs    6. Low iron  This could be the potential etiology of her hair loss        No follow-ups on file.       Thank you kindly for allowing me to participate in the thyroid care plan for this patient.    David Schmitz MD, FACE, [facility]      CC:   JOSEPH Goodman

## 2024-02-09 ENCOUNTER — HOSPITAL ENCOUNTER (OUTPATIENT)
Dept: LAB | Facility: MEDICAL CENTER | Age: 39
End: 2024-02-09
Attending: INTERNAL MEDICINE
Payer: COMMERCIAL

## 2024-02-09 DIAGNOSIS — E53.8 B12 DEFICIENCY: ICD-10-CM

## 2024-02-09 DIAGNOSIS — L65.9 HAIR LOSS: ICD-10-CM

## 2024-02-09 DIAGNOSIS — E06.3 HASHIMOTO'S THYROIDITIS: ICD-10-CM

## 2024-02-09 DIAGNOSIS — E55.9 VITAMIN D DEFICIENCY: ICD-10-CM

## 2024-02-09 DIAGNOSIS — E61.1 LOW IRON: ICD-10-CM

## 2024-02-09 DIAGNOSIS — E03.9 ACQUIRED HYPOTHYROIDISM: ICD-10-CM

## 2024-02-09 LAB
25(OH)D3 SERPL-MCNC: 34 NG/ML (ref 30–100)
ALBUMIN SERPL BCP-MCNC: 4.3 G/DL (ref 3.2–4.9)
ALBUMIN/GLOB SERPL: 1.7 G/DL
ALP SERPL-CCNC: 59 U/L (ref 30–99)
ALT SERPL-CCNC: 9 U/L (ref 2–50)
ANION GAP SERPL CALC-SCNC: 7 MMOL/L (ref 7–16)
AST SERPL-CCNC: 15 U/L (ref 12–45)
BASOPHILS # BLD AUTO: 0.4 % (ref 0–1.8)
BASOPHILS # BLD: 0.03 K/UL (ref 0–0.12)
BILIRUB SERPL-MCNC: 0.2 MG/DL (ref 0.1–1.5)
BUN SERPL-MCNC: 11 MG/DL (ref 8–22)
CALCIUM ALBUM COR SERPL-MCNC: 8.5 MG/DL (ref 8.5–10.5)
CALCIUM SERPL-MCNC: 8.7 MG/DL (ref 8.4–10.2)
CHLORIDE SERPL-SCNC: 107 MMOL/L (ref 96–112)
CO2 SERPL-SCNC: 26 MMOL/L (ref 20–33)
CREAT SERPL-MCNC: 0.61 MG/DL (ref 0.5–1.4)
EOSINOPHIL # BLD AUTO: 0.09 K/UL (ref 0–0.51)
EOSINOPHIL NFR BLD: 1.2 % (ref 0–6.9)
ERYTHROCYTE [DISTWIDTH] IN BLOOD BY AUTOMATED COUNT: 44.4 FL (ref 35.9–50)
FERRITIN SERPL-MCNC: 44.2 NG/ML (ref 10–291)
GFR SERPLBLD CREATININE-BSD FMLA CKD-EPI: 118 ML/MIN/1.73 M 2
GLOBULIN SER CALC-MCNC: 2.5 G/DL (ref 1.9–3.5)
GLUCOSE SERPL-MCNC: 90 MG/DL (ref 65–99)
HCT VFR BLD AUTO: 39.9 % (ref 37–47)
HGB BLD-MCNC: 13.5 G/DL (ref 12–16)
IMM GRANULOCYTES # BLD AUTO: 0.02 K/UL (ref 0–0.11)
IMM GRANULOCYTES NFR BLD AUTO: 0.3 % (ref 0–0.9)
IRON SATN MFR SERPL: 43 % (ref 15–55)
IRON SERPL-MCNC: 107 UG/DL (ref 40–170)
LYMPHOCYTES # BLD AUTO: 2.53 K/UL (ref 1–4.8)
LYMPHOCYTES NFR BLD: 34.8 % (ref 22–41)
MCH RBC QN AUTO: 32.6 PG (ref 27–33)
MCHC RBC AUTO-ENTMCNC: 33.8 G/DL (ref 32.2–35.5)
MCV RBC AUTO: 96.4 FL (ref 81.4–97.8)
MONOCYTES # BLD AUTO: 0.52 K/UL (ref 0–0.85)
MONOCYTES NFR BLD AUTO: 7.2 % (ref 0–13.4)
NEUTROPHILS # BLD AUTO: 4.08 K/UL (ref 1.82–7.42)
NEUTROPHILS NFR BLD: 56.1 % (ref 44–72)
NRBC # BLD AUTO: 0 K/UL
NRBC BLD-RTO: 0 /100 WBC (ref 0–0.2)
PLATELET # BLD AUTO: 209 K/UL (ref 164–446)
PMV BLD AUTO: 11.6 FL (ref 9–12.9)
POTASSIUM SERPL-SCNC: 3.8 MMOL/L (ref 3.6–5.5)
PROT SERPL-MCNC: 6.8 G/DL (ref 6–8.2)
RBC # BLD AUTO: 4.14 M/UL (ref 4.2–5.4)
SODIUM SERPL-SCNC: 140 MMOL/L (ref 135–145)
T4 FREE SERPL-MCNC: 1.41 NG/DL (ref 0.93–1.7)
TIBC SERPL-MCNC: 247 UG/DL (ref 250–450)
TSH SERPL DL<=0.005 MIU/L-ACNC: 1.54 UIU/ML (ref 0.38–5.33)
UIBC SERPL-MCNC: 140 UG/DL (ref 110–370)
VIT B12 SERPL-MCNC: 413 PG/ML (ref 211–911)
WBC # BLD AUTO: 7.3 K/UL (ref 4.8–10.8)

## 2024-02-09 PROCEDURE — 82306 VITAMIN D 25 HYDROXY: CPT

## 2024-02-09 PROCEDURE — 36415 COLL VENOUS BLD VENIPUNCTURE: CPT

## 2024-02-09 PROCEDURE — 80053 COMPREHEN METABOLIC PANEL: CPT

## 2024-02-09 PROCEDURE — 82728 ASSAY OF FERRITIN: CPT

## 2024-02-09 PROCEDURE — 83550 IRON BINDING TEST: CPT

## 2024-02-09 PROCEDURE — 84443 ASSAY THYROID STIM HORMONE: CPT

## 2024-02-09 PROCEDURE — 84439 ASSAY OF FREE THYROXINE: CPT

## 2024-02-09 PROCEDURE — 85025 COMPLETE CBC W/AUTO DIFF WBC: CPT

## 2024-02-09 PROCEDURE — 82607 VITAMIN B-12: CPT

## 2024-02-09 PROCEDURE — 83540 ASSAY OF IRON: CPT

## 2024-02-14 ENCOUNTER — OFFICE VISIT (OUTPATIENT)
Dept: ENDOCRINOLOGY | Facility: MEDICAL CENTER | Age: 39
End: 2024-02-14
Attending: INTERNAL MEDICINE
Payer: COMMERCIAL

## 2024-02-14 VITALS
WEIGHT: 137.9 LBS | HEIGHT: 66 IN | OXYGEN SATURATION: 100 % | HEART RATE: 117 BPM | SYSTOLIC BLOOD PRESSURE: 110 MMHG | BODY MASS INDEX: 22.16 KG/M2 | DIASTOLIC BLOOD PRESSURE: 68 MMHG

## 2024-02-14 DIAGNOSIS — L65.9 HAIR LOSS: ICD-10-CM

## 2024-02-14 DIAGNOSIS — E06.3 HASHIMOTO'S THYROIDITIS: ICD-10-CM

## 2024-02-14 DIAGNOSIS — E03.9 ACQUIRED HYPOTHYROIDISM: ICD-10-CM

## 2024-02-14 DIAGNOSIS — E53.8 B12 DEFICIENCY: ICD-10-CM

## 2024-02-14 DIAGNOSIS — E61.1 LOW IRON: ICD-10-CM

## 2024-02-14 DIAGNOSIS — E55.9 VITAMIN D DEFICIENCY: ICD-10-CM

## 2024-02-14 PROCEDURE — 99211 OFF/OP EST MAY X REQ PHY/QHP: CPT | Performed by: INTERNAL MEDICINE

## 2024-02-14 PROCEDURE — 3074F SYST BP LT 130 MM HG: CPT | Performed by: INTERNAL MEDICINE

## 2024-02-14 PROCEDURE — 3078F DIAST BP <80 MM HG: CPT | Performed by: INTERNAL MEDICINE

## 2024-02-14 PROCEDURE — 99214 OFFICE O/P EST MOD 30 MIN: CPT | Performed by: INTERNAL MEDICINE

## 2024-02-14 RX ORDER — LEVOTHYROXINE SODIUM 100 MCG
100 TABLET ORAL
Qty: 90 TABLET | Refills: 2 | Status: SHIPPED | OUTPATIENT
Start: 2024-02-14

## 2024-02-14 RX ORDER — ERGOCALCIFEROL 1.25 MG/1
50000 CAPSULE ORAL
Qty: 12 CAPSULE | Refills: 3 | Status: SHIPPED | OUTPATIENT
Start: 2024-02-14

## 2024-02-14 ASSESSMENT — FIBROSIS 4 INDEX: FIB4 SCORE: 0.89

## 2024-02-14 NOTE — PROGRESS NOTES
Chief Complaint: Follow up for Primary Hypothyroidism. Patient was presented for a telehealth consultation via secure and encrypted videoconferencing technology.     SheHPI:     Roxane Toth is a 39 y.o. female here for follow up of Primary Hypothyroidism.  She was previously a patient of DEYSI Arce    Historically she had palpitations with Synthroid 100 mcg daily and initially did well on Synthroid 88 mcg daily until she had a TSH of 12 on September 2023  She also had hair loss secondary to iron deficiency  Social history wise, she works at [employer] at the [employer] department      She remains on Synthroid 100mcg daily which has been her dose for over 6  months.   She reports excellent compliance and denies missing any daily doses.   She takes thyroid hormone prior to breakfast.   She  denies taking any iron, calcium supplements or antacids.      She denies weight gain  She reports her fatigue is resolved  Her insomnia is gone  She still has hair loss but she thinks its from iron deficiency  She does report heavy periods which last for 5 days   She reports regular cycles     Latest Reference Range & Units 11/11/23 10:22   TSH 0.380 - 5.330 uIU/mL 1.270   Free T-4 0.93 - 1.70 ng/dL 1.79 (H)        Latest Reference Range & Units 09/11/23 17:03   25-Hydroxy   Vitamin D 25 30 - 100 ng/mL 39   Ferritin 10.0 - 291.0 ng/mL 71.6   Vitamin B12 -True Cobalamin 211 - 911 pg/mL 418   TSH 0.380 - 5.330 uIU/mL 12.930 (H)   Free T-4 0.93 - 1.70 ng/dL 1.07         Patient's medications, allergies, and social histories were reviewed and updated as appropriate.      ROS:     CONS:     No fever, no chills   EYES:     No diplopia, no blurry vision   CV:           No chest pain, no palpitations   PULM:     No SOB, no cough, no hemoptysis.   GI:            No nausea, no vomiting, no diarrhea, no constipation   ENDO:     No polyuria, no polydipsia, no heat intolerance, no cold intolerance       Past Medical  "History:  Problem List:  2023: Dysmenorrhea  2022: RUBY (generalized anxiety disorder)  2022: GERD (gastroesophageal reflux disease)  2022: B12 deficiency  2022: Hair loss  2022: Non-celiac gluten sensitivity  2020: Hashimoto's thyroiditis  2020: Vitamin D deficiency  2019: Tobacco abuse  2017: Hot flashes  2017: Dyspepsia  2015: Subclinical hypothyroidism  2015: Family history of thyroid cancer  2015: Hypothyroidism  2015: Migraine  2013-10: Abdominal pain, other specified site  2012: Health examination of defined subpopulation      Past Surgical History:  Past Surgical History:   Procedure Laterality Date    GASTROSCOPY-ENDO  10/25/2013    Performed by Zohaib Felix M.D. at [facility]    EGD WITH ASP/BX  10/25/2013    Performed by Zohaib Felix M.D. at [facility]    ORIF, ELBOW  1991    right elbow    TONSILLECTOMY AND ADENOIDECTOMY  as child        Allergies:  Iodine     Social History:  Social History     Tobacco Use    Smoking status: Former     Current packs/day: 0.00     Average packs/day: 1 pack/day for 17.0 years (17.0 ttl pk-yrs)     Types: Cigarettes     Start date: 2003     Quit date: 2020     Years since quittin.0    Smokeless tobacco: Never   Vaping Use    Vaping Use: Never used   Substance Use Topics    Alcohol use: Yes     Comment: occasional    Drug use: Not Currently        Family History:   family history includes Cancer in her maternal grandfather and mother; Hypertension in her maternal grandfather and maternal grandmother; Other in her father; Stroke in her maternal grandfather.      PHYSICAL EXAM:   Vital signs: /68 (BP Location: Right arm, Patient Position: Sitting, BP Cuff Size: Adult)   Pulse (!) 117   Ht 1.676 m (5' 6\")   Wt 62.6 kg (137 lb 14.4 oz)   SpO2 100%   BMI 22.26 kg/m²   GENERAL: Well-developed, well-nourished in no apparent distress.   EYE:  No ocular asymmetry, " PERRLA  HENT: Pink, moist mucous membranes.    NECK: Thyroid is slightly enlarged and feels bosselated  CARDIOVASCULAR:  No murmurs  LUNGS: Clear breath sounds  ABDOMEN: Soft, nontender   EXTREMITIES: No clubbing, cyanosis, or edema.   NEUROLOGICAL: No gross focal motor abnormalities   LYMPH: No cervical adenopathy seen  SKIN: No rashes, lesions.       Labs:  Lab Results   Component Value Date/Time    SODIUM 140 2024 02:15 PM    POTASSIUM 3.8 2024 02:15 PM    CHLORIDE 107 2024 02:15 PM    CO2 26 2024 02:15 PM    ANION 7.0 2024 02:15 PM    GLUCOSE 90 2024 02:15 PM    BUN 11 2024 02:15 PM    CREATININE 0.61 2024 02:15 PM    CREATININE 0.7 2007 09:55 PM    CALCIUM 8.7 2024 02:15 PM    ASTSGOT 15 2024 02:15 PM    ALTSGPT 9 2024 02:15 PM    TBILIRUBIN 0.2 2024 02:15 PM    ALBUMIN 4.3 2024 02:15 PM    TOTPROTEIN 6.8 2024 02:15 PM    GLOBULIN 2.5 2024 02:15 PM    AGRATIO 1.7 2024 02:15 PM       Lab Results   Component Value Date/Time    SODIUM 138 2019 0723    POTASSIUM 3.7 2019 0723    CHLORIDE 105 2019 0723    CO2 25 2019 0723    GLUCOSE 90 2019 0707    BUN 10 2019 0723    CREATININE 0.73 2019 0723    CALCIUM 9.8 2019 0723    ANION 8.0 2019 0723       Lab Results   Component Value Date/Time    CHOLSTRLTOT 147 2019 0707    TRIGLYCERIDE 47 2019 0707    HDL 60 2019 0707    LDL 78 2019 0707       Lab Results   Component Value Date/Time    TSHULTRASEN 0.540 2020 1730     Lab Results   Component Value Date/Time    FREET4 1.18 2020 1730     Lab Results   Component Value Date/Time    FREET3 3.40 2019 1726     No results found for: THYSTIMIG    Lab Results   Component Value Date/Time    MICROSOMALA 1556.9 (H) 2015 1043         Imagin2017 4:23 PM     HISTORY/REASON FOR EXAM:  Hyperthyroidism and history of heterogeneous  thyroid        TECHNIQUE/EXAM DESCRIPTION:  Ultrasound of the soft tissues of the head and neck.     COMPARISON:  06/01/2015     FINDINGS:  The thyroid gland is heterogeneous.  Vascularity is normal.     The right lobe of the thyroid gland measures 1.40 cm x 3.33 cm x 1.41 cm.  The left lobe of the thyroid gland measures 1.16 cm x 3.48 cm x 1.11 cm.  The isthmus measures 0.24 cm.           IMPRESSION:     1.  Thyroid gland again appears heterogeneous with appearance that is unchanged compared to the prior exam.     2.  No mass or nodule has developed since the prior exam.        ASSESSMENT/PLAN:     1. Acquired hypothyroidism  stable  Continue Synthroid 100 MCG daily  Reviewed proper administration of thyroid hormone  Patient should take thyroid hormone 30-60 minutes before breakfast on an empty stomach plain water and not take it together with food, iron, calcium, and antacids.  Follow-up in 6 mos    2. Hashimoto's thyroiditis  This is the etiology of her hypothyroidism  As proven by her elevated TPO antibodies of 1500 from May 2015    3. Vitamin D deficiency  Stable   Vitamin D labs were reviewed with patient  Continue current supplements  Continue monitoring levels     4. B12 deficiency  Controlled  Restart B12 sublingual 1000 mcg daily  Continue monitoring    5. Hair loss  Improved this is from LIANA    6. Low iron  Unstable  Take ferritin every other day for better absorption and take with vitamin C  Take 4 hours apart from Synthroid to avoid interaction       Return in about 6 months (around 8/14/2024).       Thank you kindly for allowing me to participate in the thyroid care plan for this patient.    David Schmitz MD, FACE, [facility]      CC:   JOSEPH Goodman

## 2024-02-15 ENCOUNTER — TELEMEDICINE (OUTPATIENT)
Dept: MEDICAL GROUP | Facility: PHYSICIAN GROUP | Age: 39
End: 2024-02-15
Payer: COMMERCIAL

## 2024-02-15 ENCOUNTER — HOSPITAL ENCOUNTER (OUTPATIENT)
Dept: RADIOLOGY | Facility: MEDICAL CENTER | Age: 39
End: 2024-02-15
Attending: NURSE PRACTITIONER
Payer: COMMERCIAL

## 2024-02-15 VITALS — HEIGHT: 66 IN | BODY MASS INDEX: 21.86 KG/M2 | WEIGHT: 136 LBS

## 2024-02-15 DIAGNOSIS — G44.53 THUNDERCLAP HEADACHE: ICD-10-CM

## 2024-02-15 DIAGNOSIS — G44.82 HEADACHE ASSOCIATED WITH ORGASM: ICD-10-CM

## 2024-02-15 DIAGNOSIS — E06.3 HASHIMOTO'S THYROIDITIS: ICD-10-CM

## 2024-02-15 DIAGNOSIS — G43.109 MIGRAINE WITH AURA AND WITHOUT STATUS MIGRAINOSUS, NOT INTRACTABLE: ICD-10-CM

## 2024-02-15 PROCEDURE — 99214 OFFICE O/P EST MOD 30 MIN: CPT | Performed by: NURSE PRACTITIONER

## 2024-02-15 PROCEDURE — 70450 CT HEAD/BRAIN W/O DYE: CPT

## 2024-02-15 ASSESSMENT — PATIENT HEALTH QUESTIONNAIRE - PHQ9: CLINICAL INTERPRETATION OF PHQ2 SCORE: 0

## 2024-02-15 ASSESSMENT — FIBROSIS 4 INDEX: FIB4 SCORE: 0.89

## 2024-02-15 NOTE — ASSESSMENT & PLAN NOTE
Pt reports that over the last 2 weeks she has started having intense headaches that she describes as a thunderclap post orgasm. She states that the headache starts in the back right side of her scalp and radiates over the entire head to the temples. She describes the pain as pulsating. Rates the pain at 10/10 when it happens. She states that this is a very different headache than she usually experiences with her migraines. She states that leaning forward will increase the pain. Denies any vision changes, dizziness, or vertigo with the headaches. She has not tried any OTC medications. She has been trying to stay hydrated.     She denies any recent trauma or injury.     Will order STAT head CT to rule out SAH or temporal arteritis.  Depending on results may order additional labs.  Discussed with patient a burst of steroids may help decrease any inflammation that might be contributing to her headaches.  Will prescribe this only if CT scan is negative.

## 2024-02-15 NOTE — PROGRESS NOTES
Virtual Visit: Established Patient   This visit was conducted via Zoom using secure and encrypted videoconferencing technology. The patient was in a private location in the state of [place].    The patient's identity was confirmed and verbal consent was obtained for this virtual visit.    Subjective:   CC: Headache post orgasm    Roxane Mojica is a 39 y.o. female presenting for evaluation and management of:    Problem   Headache Associated With Orgasm   Hashimoto's Thyroiditis   Migraine   Hair Loss (Resolved)       ROS   Denies any recent fevers or chills. No nausea or vomiting. No chest pains or shortness of breath.     Allergies   Allergen Reactions    Iodine Anaphylaxis and Hives       Current medicines (including changes today)  Current Outpatient Medications   Medication Sig Dispense Refill    SYNTHROID 100 MCG Tab Take 1 Tablet by mouth every morning on an empty stomach. 90 Tablet 2    vitamin D2, Ergocalciferol, (DRISDOL) 1.25 MG (75649 UT) Cap capsule Take 1 Capsule by mouth every 7 days. 12 Capsule 3    pantoprazole (PROTONIX) 40 MG Tablet Delayed Response Take 1 Tablet by mouth every day. 90 Tablet 3    ferrous sulfate 325 (65 Fe) MG tablet TAKE ON MONDAY, WEDNESDAY AND FRIDAY. 36 Tablet 4    predniSONE (DELTASONE) 20 MG Tab Take 2 Tablets by mouth every morning with breakfast for 5 days. 10 Tablet 0     No current facility-administered medications for this visit.       Patient Active Problem List    Diagnosis Date Noted    Headache associated with orgasm 02/15/2024    Dysmenorrhea 08/09/2023    RUBY (generalized anxiety disorder) 01/12/2022    GERD (gastroesophageal reflux disease) 01/12/2022    B12 deficiency 01/12/2022    Non-celiac gluten sensitivity 01/12/2022    Hashimoto's thyroiditis 01/29/2020    Vitamin D deficiency 01/29/2020    Hot flashes 05/02/2017    Family history of thyroid cancer 05/21/2015    Hypothyroidism 03/30/2015    Migraine 03/30/2015         She  has a past medical history  "of Anxiety, Heart burn, Indigestion, Renal disorder (01/01/2011), and Thyroid disease.    She has no past medical history of Allergy or Anemia.  She  has a past surgical history that includes orif, elbow (1991); tonsillectomy and adenoidectomy (as child); gastroscopy-endo (10/25/2013); and egd with asp/bx (10/25/2013).       Objective:   Ht 1.676 m (5' 6\")   Wt 61.7 kg (136 lb)   LMP 02/11/2024 (Exact Date)   BMI 21.95 kg/m²     Physical Exam:  Constitutional: Alert, no distress, well-groomed.  Skin: No rashes in visible areas.  Eye: Round. Conjunctiva clear, lids normal. No icterus.   ENMT: Lips pink without lesions, good dentition, moist mucous membranes. Phonation normal.  Neck: Moves freely without pain.  Respiratory: Unlabored respiratory effort, no cough or audible wheeze  Psych: Alert and oriented x3, normal affect and mood.       Assessment and Plan:   The following treatment plan was discussed:     1. Headache associated with orgasm  CT-HEAD W/O      2. Thunderclap headache  CT-HEAD W/O      3. Migraine with aura and without status migrainosus, not intractable        4. Hashimoto's thyroiditis            Headache associated with orgasm  Pt reports that over the last 2 weeks she has started having intense headaches that she describes as a thunderclap post orgasm. She states that the headache starts in the back right side of her scalp and radiates over the entire head to the temples. She describes the pain as pulsating. Rates the pain at 10/10 when it happens. She states that this is a very different headache than she usually experiences with her migraines. She states that leaning forward will increase the pain. Denies any vision changes, dizziness, or vertigo with the headaches. She has not tried any OTC medications. She has been trying to stay hydrated.     She denies any recent trauma or injury.     Will order STAT head CT to rule out SAH or temporal arteritis.  Depending on results may order additional " labs.  Discussed with patient a burst of steroids may help decrease any inflammation that might be contributing to her headaches.  Will prescribe this only if CT scan is negative.            Migraine  Chronic stable.    Patient reports her migraines are consistent with previous pattern.    Patient has been having intense headaches after orgasm for the last 2 weeks.  She states that these are very different from her typical migraine.    Patient to continue OTC pain relievers as well as rest to help with migraines.    Hashimoto's thyroiditis  Chronic stable.    This is managed by endocrinology.    Continue Synthroid 100 mcg daily.  Continue following with endocrinology.      Follow-up: Return in about 1 month (around 3/15/2024) for headache.      Please note that this dictation was created using voice recognition software. I have worked with consultants from the vendor as well as technical experts from [employer] to optimize the interface. I have made every reasonable attempt to correct obvious errors, but I expect that there are errors of grammar and possibly content that I did not discover before finalizing the note.

## 2024-02-22 ENCOUNTER — PATIENT MESSAGE (OUTPATIENT)
Dept: MEDICAL GROUP | Facility: PHYSICIAN GROUP | Age: 39
End: 2024-02-22
Payer: COMMERCIAL

## 2024-02-22 RX ORDER — PREDNISONE 20 MG/1
40 TABLET ORAL
Qty: 10 TABLET | Refills: 0 | Status: SHIPPED | OUTPATIENT
Start: 2024-02-22 | End: 2024-02-27

## 2024-02-23 PROBLEM — L65.9 HAIR LOSS: Status: RESOLVED | Noted: 2022-01-12 | Resolved: 2024-02-23

## 2024-02-24 NOTE — ASSESSMENT & PLAN NOTE
Chronic stable.    Patient reports her migraines are consistent with previous pattern.    Patient has been having intense headaches after orgasm for the last 2 weeks.  She states that these are very different from her typical migraine.    Patient to continue OTC pain relievers as well as rest to help with migraines.

## 2024-02-24 NOTE — ASSESSMENT & PLAN NOTE
Chronic stable.    This is managed by endocrinology.    Continue Synthroid 100 mcg daily.  Continue following with endocrinology.

## 2024-03-20 ENCOUNTER — OFFICE VISIT (OUTPATIENT)
Dept: MEDICAL GROUP | Facility: PHYSICIAN GROUP | Age: 39
End: 2024-03-20
Payer: COMMERCIAL

## 2024-03-20 VITALS
WEIGHT: 133 LBS | OXYGEN SATURATION: 98 % | RESPIRATION RATE: 16 BRPM | BODY MASS INDEX: 21.38 KG/M2 | HEIGHT: 66 IN | DIASTOLIC BLOOD PRESSURE: 58 MMHG | SYSTOLIC BLOOD PRESSURE: 92 MMHG | HEART RATE: 116 BPM | TEMPERATURE: 97.4 F

## 2024-03-20 DIAGNOSIS — G43.C0 PERIODIC HEADACHE SYNDROME, NOT INTRACTABLE: ICD-10-CM

## 2024-03-20 PROCEDURE — 3078F DIAST BP <80 MM HG: CPT | Performed by: NURSE PRACTITIONER

## 2024-03-20 PROCEDURE — 3074F SYST BP LT 130 MM HG: CPT | Performed by: NURSE PRACTITIONER

## 2024-03-20 PROCEDURE — 99214 OFFICE O/P EST MOD 30 MIN: CPT | Performed by: NURSE PRACTITIONER

## 2024-03-20 RX ORDER — ONDANSETRON 4 MG/1
4 TABLET, ORALLY DISINTEGRATING ORAL EVERY 6 HOURS PRN
Qty: 10 TABLET | Refills: 0 | Status: SHIPPED | OUTPATIENT
Start: 2024-03-20

## 2024-03-20 RX ORDER — SUMATRIPTAN 50 MG/1
25-50 TABLET, FILM COATED ORAL
Qty: 10 TABLET | Refills: 3 | Status: SHIPPED | OUTPATIENT
Start: 2024-03-20

## 2024-03-20 ASSESSMENT — FIBROSIS 4 INDEX: FIB4 SCORE: 0.89

## 2024-03-20 NOTE — PROGRESS NOTES
"  Chief Complaint   Patient presents with    Follow-Up     Headache    Paperwork    Ear Fullness     With pain x 2 weeks                                                                                                                                       HPI:   Roxane presents today with the following.    The patient presented today to discuss a series of health concerns, primarily focusing on recurrent headaches that have been troubling them for the past few weeks. The patient noted that the headaches, which are throbbing in nature, initially showed improvement with prednisone treatment but have since returned, occurring a couple of times a week. These headaches are particularly severe when bending over or upon waking in the morning. While not consistently accompanied by nausea, the patient does experience it occasionally. Additionally, the patient mentioned that the headaches occur after orgasm.    The patient has a history of leg pain and consulted Dr. Bains in October regarding this issue. It was noted that there is no family history of migraines or headaches. Recent laboratory tests and imaging studies have returned with no concerning findings, indicating normal levels of iron, vitamin D, ferritin, and thyroid function.    Furthermore, the patient reported experiencing ear pain that began two weeks prior to the visit, with a noted increase in severity the day before the consultation. The patient described discomfort when using silicone earplugs at night, a measure taken due to a partner's snoring. The patient confirmed not having been sick recently.    ROS:  All systems negative expect as addressed in assessment and plan.     BP 92/58 (BP Location: Left arm, Patient Position: Sitting)   Pulse (!) 116   Temp 36.3 °C (97.4 °F) (Temporal)   Resp 16   Ht 1.676 m (5' 6\")   Wt 60.3 kg (133 lb)   LMP 03/11/2024 (Exact Date)   SpO2 98%   BMI 21.47 kg/m²     Objective:    Physical Exam  Vitals reviewed. "   Constitutional:       Appearance: Normal appearance.   HENT:      Head: Normocephalic and atraumatic.      Right Ear: A middle ear effusion is present.      Left Ear: A middle ear effusion is present.      Mouth/Throat:      Mouth: Mucous membranes are moist.   Eyes:      Extraocular Movements: Extraocular movements intact.      Conjunctiva/sclera: Conjunctivae normal.   Pulmonary:      Effort: Pulmonary effort is normal.   Musculoskeletal:         General: Normal range of motion.      Cervical back: Normal range of motion.   Skin:     General: Skin is warm and dry.   Neurological:      General: No focal deficit present.      Mental Status: She is alert and oriented to person, place, and time.   Psychiatric:         Mood and Affect: Mood normal.         Behavior: Behavior normal.         Thought Content: Thought content normal.         Diagnostic Test Results:  - Physical examination of the head revealed no specific findings, suggesting no abnormalities in this area.  - Ear examination showed the presence of fluid and mucus behind the eardrum; however, the eardrum itself appears healthy and not inflamed, suggesting no acute infection.  - Head CT results were reported as good, with no concerning findings.  - Laboratory tests including Iron levels, Vitamin D, Ferritin, and Thyroid function tests were all within normal limits, indicating no significant abnormalities in these areas.    Assessment and Plan:  39 y.o. female with the following issues.    Problem List Items Addressed This Visit       Periodic headache syndrome, not intractable    Relevant Medications    SUMAtriptan (IMITREX) 50 MG Tab    ondansetron (ZOFRAN ODT) 4 MG TABLET DISPERSIBLE        1. Headaches (Possible Cluster Headaches or Migraines):     - Assessment: The patient reports experiencing headaches a couple of times a week, particularly upon waking. There is no family history of migraines or headaches. Previous labs and imaging showed no  concerning findings.     - Plan:       a. Prescribe Imitrex (sumatriptan) 50 mg for headache onset. Instruct the patient to take half a tablet initially and the other half if no relief is found within 30-45 minutes.       b. Prescribe Zofran for nausea associated with headaches.       c. Recommend trying blue light blocking glasses for computer work to reduce eye strain.       d. Schedule a follow-up in 2 months to assess the effectiveness of Imitrex and consider a preventative medication if necessary (e.g., amitriptyline or nortriptyline).    2. Ear Pain and Fluid Buildup:     - Assessment: The patient reports ear pain and hearing fluid in the ear for the past two weeks, with pain worsening in the past day. No recent illness reported. Examination shows a healthy eardrum with fluid and bubbles present.     - Plan:       a. Advise the use of Afrin nasal spray for 3-5 days (maximum of 7 days) to reduce mucus and swelling.       b. Recommend Flonase or Nasacort daily for at least a week to decrease inflammation and improve eustachian tube function.       c. Suggest using a heating pad or warm compress over the ear before bed to help with drainage and reduce pain when using earplugs.       d. Recommend taking Sudafed before traveling to help with pressure changes and fluid drainage.    3. FMLA Paperwork:     - Plan:       a. Complete and submit FMLA paperwork on behalf of the patient, indicating headache frequency and duration.       b. Provide the patient with a copy of the completed FMLA paperwork.    Follow-up Instructions:  - Patients are advised to follow the prescribed treatment plans and monitor their symptoms closely.  - Encourage the patient to maintain communication, reaching out with any needs or concerns as they arise.    Return in about 2 months (around 5/20/2024) for Headaches.      Please note that this dictation was created using voice recognition software. I have worked with consultants from the vendor  as well as technical experts from [facility] to optimize the interface. I have made every reasonable attempt to correct obvious errors, but I expect that there are errors of grammar and possibly content that I did not discover before finalizing the note.

## 2024-03-28 ENCOUNTER — APPOINTMENT (OUTPATIENT)
Dept: MEDICAL GROUP | Facility: PHYSICIAN GROUP | Age: 39
End: 2024-03-28
Payer: COMMERCIAL

## 2024-04-29 ENCOUNTER — PATIENT MESSAGE (OUTPATIENT)
Dept: MEDICAL GROUP | Facility: PHYSICIAN GROUP | Age: 39
End: 2024-04-29
Payer: COMMERCIAL

## 2024-04-29 DIAGNOSIS — H65.93 FLUID LEVEL BEHIND TYMPANIC MEMBRANE OF BOTH EARS: ICD-10-CM

## 2024-04-29 DIAGNOSIS — L29.9 EAR ITCHING: ICD-10-CM

## 2024-04-30 RX ORDER — AZELASTINE 1 MG/ML
1 SPRAY, METERED NASAL 2 TIMES DAILY
Qty: 30 ML | Refills: 5 | Status: SHIPPED | OUTPATIENT
Start: 2024-04-30

## 2024-05-07 DIAGNOSIS — E03.9 ACQUIRED HYPOTHYROIDISM: ICD-10-CM

## 2024-05-13 ENCOUNTER — TELEPHONE (OUTPATIENT)
Dept: OBGYN | Facility: CLINIC | Age: 39
End: 2024-05-13
Payer: COMMERCIAL

## 2024-05-13 NOTE — TELEPHONE ENCOUNTER
I returned pt . She is experiencing spotting in early stages of pregnancy. Pt did not answer and I left a message on VM

## 2024-05-22 ENCOUNTER — OFFICE VISIT (OUTPATIENT)
Dept: MEDICAL GROUP | Facility: PHYSICIAN GROUP | Age: 39
End: 2024-05-22
Payer: COMMERCIAL

## 2024-05-22 ENCOUNTER — TELEPHONE (OUTPATIENT)
Dept: OBGYN | Facility: CLINIC | Age: 39
End: 2024-05-22
Payer: COMMERCIAL

## 2024-05-22 VITALS
HEIGHT: 66 IN | OXYGEN SATURATION: 100 % | DIASTOLIC BLOOD PRESSURE: 70 MMHG | TEMPERATURE: 97.4 F | HEART RATE: 95 BPM | RESPIRATION RATE: 18 BRPM | WEIGHT: 136 LBS | SYSTOLIC BLOOD PRESSURE: 118 MMHG | BODY MASS INDEX: 21.86 KG/M2

## 2024-05-22 DIAGNOSIS — G43.C0 PERIODIC HEADACHE SYNDROME, NOT INTRACTABLE: ICD-10-CM

## 2024-05-22 PROCEDURE — 99214 OFFICE O/P EST MOD 30 MIN: CPT | Performed by: NURSE PRACTITIONER

## 2024-05-22 PROCEDURE — 3074F SYST BP LT 130 MM HG: CPT | Performed by: NURSE PRACTITIONER

## 2024-05-22 PROCEDURE — 3078F DIAST BP <80 MM HG: CPT | Performed by: NURSE PRACTITIONER

## 2024-05-22 RX ORDER — BUTALBITAL, ACETAMINOPHEN AND CAFFEINE 50; 325; 40 MG/1; MG/1; MG/1
1 TABLET ORAL EVERY 6 HOURS PRN
Qty: 10 TABLET | Refills: 0 | Status: SHIPPED | OUTPATIENT
Start: 2024-05-22 | End: 2024-06-21

## 2024-05-22 ASSESSMENT — FIBROSIS 4 INDEX: FIB4 SCORE: 0.89

## 2024-05-22 NOTE — PROGRESS NOTES
"  Chief Complaint   Patient presents with    Follow-Up     Headaches    Other     7 weeks pregnant                                                                                                                                       HPI:   Roxane presents today with the following.    The patient reports that her headaches have persisted, noting that they have been challenging to manage since discontinuing her usual medications due to her recent discovery of being pregnant. She describes the intensity of her migraines, mentioning a particularly severe episode last Friday.    The patient has informed her endocrinologist, Dr. Pérez, about her pregnancy, leading to an adjustment in her Synthroid dosage. She is also experiencing significant swelling in her legs and arms, which she attributes to her pregnancy.    The patient is currently experiencing spotting, which began as pink and turned to red, now brown, accompanied by lower back pain and cramps. She expresses concern about this symptom, although she understands it might be related to a subchorionic hemorrhage as explained in her discussion.    The patient has an upcoming appointment with Dr. Shannon Lepe, an OBGYN she has not previously seen, scheduled for the 30th. This will be her first visit with Dr. Lepe, where she anticipates undergoing a prenatal panel and other baseline tests.    The patient's ongoing headache management challenges, medication adjustments, pregnancy-related symptoms, and upcoming prenatal appointment have been discussed.    ROS:  All systems negative expect as addressed in assessment and plan.     /70 (BP Location: Left arm, Patient Position: Sitting)   Pulse 95   Temp 36.3 °C (97.4 °F) (Temporal)   Resp 18   Ht 1.676 m (5' 6\")   Wt 61.7 kg (136 lb)   LMP 04/03/2024   SpO2 100%   BMI 21.95 kg/m²     Objective:    Physical Exam  Vitals reviewed.   Constitutional:       Appearance: Normal appearance.   HENT:      Head: " Normocephalic and atraumatic.      Mouth/Throat:      Mouth: Mucous membranes are moist.   Eyes:      Extraocular Movements: Extraocular movements intact.      Conjunctiva/sclera: Conjunctivae normal.   Pulmonary:      Effort: Pulmonary effort is normal.   Musculoskeletal:         General: Normal range of motion.      Cervical back: Normal range of motion.   Skin:     General: Skin is warm and dry.   Neurological:      General: No focal deficit present.      Mental Status: She is alert and oriented to person, place, and time.   Psychiatric:         Mood and Affect: Mood normal.         Behavior: Behavior normal.         Thought Content: Thought content normal.         Clinical Observations:  - Physical Examination:    - Headaches: Persistent, similar in frequency and intensity to pre-pregnancy, with a severe migraine reported on the previous Friday.    - Swelling: Noted in legs and arms, attributed to normal physiological changes in pregnancy.    - Spotting: Described as starting pink, turning red, and then brown over the past week, associated with lower back pain and cramps.    - Lower Back Pain: Attributed to hormonal changes, specifically the production of relaxin.    Diagnostic Test Results and Labs:  - Synthroid Adjustment: Dr. Pérez increased Synthroid dosage upon notification of pregnancy (no specific date provided).  - Planned Diagnostic Tests: Dr. Lepe to order a prenatal panel, baseline CBC, chemistry panel, and routine urine tests at the first prenatal visit (scheduled for May 30, 2024).    Assessment and Plan:  39 y.o. female with the following issues.    1. Periodic headache syndrome, not intractable  - butalbital/apap/caffeine (FIORICET) -40 mg Tab; Take 1 Tablet by mouth every 6 hours as needed for Headache or Migraine for up to 30 days.  Dispense: 10 Tablet; Refill: 0    1. Headaches and Migraines:     - Plan: Prescribe Fioricet tablets, but advise the patient to wait until discussing  with Dr. Shannon Lepe before taking them. Recommend Tylenol as an alternative, but note its limited effectiveness for migraines.    2. Pregnancy and Prenatal Care:     - Plan: Encourage the patient to attend their appointment with Dr. Shannon Lepe on the 30th. Discuss prenatal panel, CBC, chemistry panel, and urine tests with the OBGYN. Recommend staying hydrated, avoiding large quantities of salt, and informing Dr. Lepe about leg and arm swelling.    3. Spotting and Potential Subchorionic Hemorrhage:     - Plan: Reassure the patient that spotting is likely due to subchorionic hemorrhage, but advise avoiding strenuous activity and penetration until seeing Dr. Lepe. If spotting worsens, recommend going to the ER for immediate imaging.    4. Nausea and Vomiting in Pregnancy:     - Plan: Suggest taking Doxylamine Succinate (Unisom sleep tabs) and Vitamin B6 for nausea in the first trimester. Inform the patient that Zofran is typically safe in the second trimester.    5. Hypothyroidism and Synthroid Adjustment:     - Plan: Monitor thyroid function postpartum and adjust Synthroid dosage as needed. Follow up with the patient three to four months after pregnancy.    6. Anemia:     - Plan: Monitor anemia postpartum and start supplementation if necessary. Discuss with the OBGYN the possibility of additional iron supplementation during pregnancy based on lab results.    7. Diet and Prenatal Vitamins:     - Plan: Advise the patient on food safety during pregnancy, including avoiding raw fish and high-mercury fish, heating deli meat, and washing produce. Recommend continuing vitamin D supplementation and ensuring prenatal vitamins contain omega-3 fatty acids, fish oil, and DHA.    8. Potential Urinary Tract Infection (UTI):     - Plan: Instruct the patient to visit the clinic if they suspect a UTI and cannot see their OBGYN for testing and antibiotics.    9. Cold, Sinus Infection, or Cough During Pregnancy:     -  Plan: Advise the patient to visit the clinic for treatment, ensuring medications prescribed are safe during pregnancy.    Return for as needed or yearly.      Please note that this dictation was created using voice recognition software. I have worked with consultants from the vendor as well as technical experts from [employer] [facility] to optimize the interface. I have made every reasonable attempt to correct obvious errors, but I expect that there are errors of grammar and possibly content that I did not discover before finalizing the note.

## 2024-05-22 NOTE — TELEPHONE ENCOUNTER
Pt called stating has been spotting and is pregnant and is wondering if it is normal. Advised pt during the 1st trimester spotting can occur advised pt if she begins to have vaginal bleeding like a period along with cramps like a period to go  to ER to be further evaluated other wise monitor sx's. Pt understood

## 2024-05-29 DIAGNOSIS — E03.9 ACQUIRED HYPOTHYROIDISM: ICD-10-CM

## 2024-05-30 ENCOUNTER — HOSPITAL ENCOUNTER (OUTPATIENT)
Facility: MEDICAL CENTER | Age: 39
End: 2024-05-30
Attending: OBSTETRICS & GYNECOLOGY
Payer: COMMERCIAL

## 2024-05-30 ENCOUNTER — INITIAL PRENATAL (OUTPATIENT)
Dept: OBGYN | Facility: CLINIC | Age: 39
End: 2024-05-30
Payer: COMMERCIAL

## 2024-05-30 VITALS — DIASTOLIC BLOOD PRESSURE: 70 MMHG | BODY MASS INDEX: 22.27 KG/M2 | SYSTOLIC BLOOD PRESSURE: 132 MMHG | WEIGHT: 138 LBS

## 2024-05-30 DIAGNOSIS — Z32.00 ENCOUNTER FOR PREGNANCY TEST, RESULT UNKNOWN: ICD-10-CM

## 2024-05-30 DIAGNOSIS — E06.3 HASHIMOTO'S THYROIDITIS: ICD-10-CM

## 2024-05-30 DIAGNOSIS — O02.1 MISSED ABORTION: ICD-10-CM

## 2024-05-30 DIAGNOSIS — N89.8 VAGINAL DISCHARGE: ICD-10-CM

## 2024-05-30 DIAGNOSIS — Z12.4 SCREENING FOR CERVICAL CANCER: ICD-10-CM

## 2024-05-30 LAB
CANDIDA DNA VAG QL PROBE+SIG AMP: NEGATIVE
G VAGINALIS DNA VAG QL PROBE+SIG AMP: POSITIVE
POCT INT CON NEG: NEGATIVE
POCT INT CON POS: POSITIVE
POCT URINE PREGNANCY TEST: POSITIVE
T VAGINALIS DNA VAG QL PROBE+SIG AMP: NEGATIVE

## 2024-05-30 RX ORDER — MISOPROSTOL 200 UG/1
600 TABLET ORAL EVERY 6 HOURS
Qty: 6 TABLET | Refills: 0 | Status: SHIPPED | OUTPATIENT
Start: 2024-05-30

## 2024-05-30 RX ORDER — OXYCODONE HYDROCHLORIDE 5 MG/1
5 TABLET ORAL EVERY 4 HOURS PRN
Qty: 3 TABLET | Refills: 0 | Status: SHIPPED | OUTPATIENT
Start: 2024-05-30 | End: 2024-05-31

## 2024-05-30 RX ORDER — ONDANSETRON 4 MG/1
4 TABLET, FILM COATED ORAL EVERY 4 HOURS PRN
Qty: 20 TABLET | Refills: 0 | Status: SHIPPED | OUTPATIENT
Start: 2024-05-30

## 2024-05-30 RX ORDER — IBUPROFEN 600 MG/1
600 TABLET ORAL EVERY 6 HOURS PRN
Qty: 30 TABLET | Refills: 0 | Status: SHIPPED | OUTPATIENT
Start: 2024-05-30

## 2024-05-30 RX ORDER — LEVOTHYROXINE SODIUM 100 MCG
100 TABLET ORAL
Qty: 90 TABLET | Refills: 2 | Status: SHIPPED | OUTPATIENT
Start: 2024-05-30

## 2024-05-30 ASSESSMENT — ENCOUNTER SYMPTOMS
GASTROINTESTINAL NEGATIVE: 1
EYES NEGATIVE: 1
RESPIRATORY NEGATIVE: 1
NEUROLOGICAL NEGATIVE: 1
PSYCHIATRIC NEGATIVE: 1
MUSCULOSKELETAL NEGATIVE: 1
CONSTITUTIONAL NEGATIVE: 1
CARDIOVASCULAR NEGATIVE: 1

## 2024-05-30 ASSESSMENT — EDINBURGH POSTNATAL DEPRESSION SCALE (EPDS)
THINGS HAVE BEEN GETTING ON TOP OF ME: NO, I HAVE BEEN COPING AS WELL AS EVER
I HAVE FELT SAD OR MISERABLE: NO, NOT AT ALL
I HAVE BEEN ABLE TO LAUGH AND SEE THE FUNNY SIDE OF THINGS: AS MUCH AS I ALWAYS COULD
THE THOUGHT OF HARMING MYSELF HAS OCCURRED TO ME: NEVER
I HAVE BEEN SO UNHAPPY THAT I HAVE HAD DIFFICULTY SLEEPING: NOT AT ALL
TOTAL SCORE: 1
I HAVE BEEN ANXIOUS OR WORRIED FOR NO GOOD REASON: NO, NOT AT ALL
I HAVE FELT SCARED OR PANICKY FOR NO GOOD REASON: NO, NOT AT ALL
I HAVE BEEN SO UNHAPPY THAT I HAVE BEEN CRYING: NO, NEVER
I HAVE BLAMED MYSELF UNNECESSARILY WHEN THINGS WENT WRONG: NOT VERY OFTEN
I HAVE LOOKED FORWARD WITH ENJOYMENT TO THINGS: AS MUCH AS I EVER DID

## 2024-05-30 ASSESSMENT — FIBROSIS 4 INDEX: FIB4 SCORE: 0.89

## 2024-05-30 NOTE — PROGRESS NOTES
Pt here for NOB apt. / SAB   Last pap : 2018  LMP = 4/3/24   BRENT = 1/8/25 by LMP   GA today = 8W1D  EPDS = 1  Pt is interested in genetic testing.   Pt states she has no concerns.   Phone/Pharm verified.

## 2024-05-30 NOTE — PROGRESS NOTES
GYN PROBLEM VISIT    CC:  No chief complaint on file.    HPI: Patient is a 39 y.o.  who complains of missed menses and a positive pregnancy test. She is sure of LMP which would make her 8w1d today. She reports some fatigue and hunger. She had some vaginal bleeding last week that stopped. She denies cramping or nausea or vomiting. She has not had a pap smear recently and is worried that her vaginal discharge smells different than usual. This is her first pregnancy. She has a history of Hashimoto thyroiditis and is a current smoker.     ROS:   Review of Systems   Constitutional: Negative.    HENT: Negative.     Eyes: Negative.    Respiratory: Negative.     Cardiovascular: Negative.    Gastrointestinal: Negative.    Genitourinary:         Vaginal spotting, abnormal discharge    Musculoskeletal: Negative.    Skin: Negative.    Neurological: Negative.    Endo/Heme/Allergies: Negative.    Psychiatric/Behavioral: Negative.           PFSH:  I personally reviewed the past medical and surgical histories.     Social History     Tobacco Use    Smoking status: Every Day     Current packs/day: 0.00     Average packs/day: 1 pack/day for 17.0 years (17.0 ttl pk-yrs)     Types: Cigarettes     Start date: 2003     Last attempt to quit: 2020     Years since quittin.3    Smokeless tobacco: Never   Vaping Use    Vaping status: Every Day    Substances: Nicotine    Devices: RefCollectable tank   Substance Use Topics    Alcohol use: Not Currently     Comment: Rarely    Drug use: Never       Social History     Substance and Sexual Activity   Sexual Activity Yes    Partners: Male    Birth control/protection: None        ALLERGIES / REACTIONS:  Allergies   Allergen Reactions    Iodine Anaphylaxis and Hives                           PHYSICAL EXAMINATION:  Vital Signs:   Vitals:    24 0837   BP: 132/70   Weight: 138 lb     Body mass index is 22.27 kg/m².    Physical Exam  Vitals reviewed. Exam conducted with a chaperone  present.   Constitutional:       Appearance: Normal appearance.   HENT:      Head: Normocephalic.   Eyes:      Extraocular Movements: Extraocular movements intact.      Pupils: Pupils are equal, round, and reactive to light.   Cardiovascular:      Rate and Rhythm: Normal rate.   Pulmonary:      Effort: Pulmonary effort is normal.   Abdominal:      General: Abdomen is flat.      Palpations: Abdomen is soft.   Genitourinary:     General: Normal vulva.      Exam position: Lithotomy position.      Vagina: Normal.      Cervix: Normal.      Uterus: Normal.       Adnexa: Right adnexa normal and left adnexa normal.      Comments: Normal speculum exam. Pap and Affirm collected.     TVUS ultrasound performed due to positive pregnancy test. Single intrauterine pregnancy seen with CRL consistent with 8w1d (which is 1.69cm). No fetal heart tones seen with color flow. Dr. Rosario Mcconnell was called in as a second opinion who confirms no fetal heart tones and missed . Normal adnexal. Patient was advised regarding the findings.     Musculoskeletal:         General: Normal range of motion.      Cervical back: Normal range of motion.   Skin:     General: Skin is warm and dry.   Neurological:      General: No focal deficit present.      Mental Status: She is alert and oriented to person, place, and time.   Psychiatric:         Mood and Affect: Mood normal.         Behavior: Behavior normal.        ASSESSMENT AND PLAN:  39 y.o.    1. Encounter for pregnancy test, result unknown  - POCT Pregnancy    2. Missed   - miSOPROStol (CYTOTEC) 200 MCG Tab; Take 3 Tablets by mouth every 6 hours.  Dispense: 6 Tablet; Refill: 0  - ondansetron (ZOFRAN) 4 MG Tab tablet; Take 1 Tablet by mouth every four hours as needed for Nausea/Vomiting.  Dispense: 20 Tablet; Refill: 0  - oxyCODONE immediate-release (ROXICODONE) 5 MG Tab; Take 1 Tablet by mouth every four hours as needed for Severe Pain for up to 3 doses.  Dispense: 3 Tablet;  Refill: 0  - ibuprofen (MOTRIN) 600 MG Tab; Take 1 Tablet by mouth every 6 hours as needed for Moderate Pain.  Dispense: 30 Tablet; Refill: 0  - ABO AND RH DETERMINATION; Future  - CBC WITH DIFFERENTIAL; Future  - HCG QUANTITATIVE; Future  - COMP METABOLIC PANEL    3. Screening for cervical cancer  - THINPREP PAP W/HPV AND CTNG; Future    4. Vaginal discharge  - VAGINAL PATHOGENS DNA PANEL; Future    5. Hashimoto's thyroiditis    Other orders  - Prenatal MV-Min-Fe Fum-FA-DHA (PRENATAL 1 PO); Take  by mouth.    Plan:  Patient was counseled regarding options for missed . She was counseled regarding expectant, medical, and surgical management. After counseling, she elected for medical management. Medications were sent to her pharmacy and she was given instructions for use. She was also provided a prescription for pain and nausea. We reviewed bleeding and infection precautions. She understands that a misarriage will be heavier bleeding than a menstraul period, but if she is consistently saturating a pad an hour she needs to present for [facility] for evaluation.  She was scheduled a follow up visit in two weeks     At least 45 minutes were spent on chart review, counseling the patient and coordinating future care in addition to the ultrasound that was performed in clinic and interpreted by myself with confirmation of diagnosis by Dr. Enedina Lepe M.D.  Obstetrics & Gynecology   [id number]  9:15 AM

## 2024-05-30 NOTE — LETTER
May 30, 2024         Patient: Roxane Mojica   YOB: 1985   Date of Visit: 5/30/2024           To Whom it May Concern:    Roxane Mojica was seen in my clinic on 5/30/2024. She may be excused from work from today 5/30/24 until 6/3/24 due to medical reasons.     If you have any questions or concerns, please don't hesitate to call.        Sincerely,           Melissa Lepe M.D.  Electronically Signed

## 2024-05-31 ENCOUNTER — HOSPITAL ENCOUNTER (OUTPATIENT)
Dept: LAB | Facility: MEDICAL CENTER | Age: 39
End: 2024-05-31
Attending: INTERNAL MEDICINE
Payer: COMMERCIAL

## 2024-05-31 ENCOUNTER — HOSPITAL ENCOUNTER (OUTPATIENT)
Dept: LAB | Facility: MEDICAL CENTER | Age: 39
End: 2024-05-31
Attending: OBSTETRICS & GYNECOLOGY
Payer: COMMERCIAL

## 2024-05-31 DIAGNOSIS — L65.9 HAIR LOSS: ICD-10-CM

## 2024-05-31 DIAGNOSIS — E53.8 B12 DEFICIENCY: ICD-10-CM

## 2024-05-31 DIAGNOSIS — E03.9 ACQUIRED HYPOTHYROIDISM: ICD-10-CM

## 2024-05-31 DIAGNOSIS — E61.1 LOW IRON: ICD-10-CM

## 2024-05-31 DIAGNOSIS — E55.9 VITAMIN D DEFICIENCY: ICD-10-CM

## 2024-05-31 DIAGNOSIS — E06.3 HASHIMOTO'S THYROIDITIS: ICD-10-CM

## 2024-05-31 DIAGNOSIS — O02.1 MISSED ABORTION: ICD-10-CM

## 2024-05-31 LAB
25(OH)D3 SERPL-MCNC: 30 NG/ML (ref 30–100)
ABO GROUP BLD: NORMAL
B-HCG SERPL-ACNC: ABNORMAL MIU/ML (ref 0–5)
BASOPHILS # BLD AUTO: 0.4 % (ref 0–1.8)
BASOPHILS # BLD: 0.04 K/UL (ref 0–0.12)
EOSINOPHIL # BLD AUTO: 0.1 K/UL (ref 0–0.51)
EOSINOPHIL NFR BLD: 1.1 % (ref 0–6.9)
ERYTHROCYTE [DISTWIDTH] IN BLOOD BY AUTOMATED COUNT: 44.8 FL (ref 35.9–50)
HCT VFR BLD AUTO: 41.3 % (ref 37–47)
HGB BLD-MCNC: 14.1 G/DL (ref 12–16)
IMM GRANULOCYTES # BLD AUTO: 0.05 K/UL (ref 0–0.11)
IMM GRANULOCYTES NFR BLD AUTO: 0.5 % (ref 0–0.9)
LYMPHOCYTES # BLD AUTO: 3.07 K/UL (ref 1–4.8)
LYMPHOCYTES NFR BLD: 32.8 % (ref 22–41)
MCH RBC QN AUTO: 32.7 PG (ref 27–33)
MCHC RBC AUTO-ENTMCNC: 34.1 G/DL (ref 32.2–35.5)
MCV RBC AUTO: 95.8 FL (ref 81.4–97.8)
MONOCYTES # BLD AUTO: 0.71 K/UL (ref 0–0.85)
MONOCYTES NFR BLD AUTO: 7.6 % (ref 0–13.4)
NEUTROPHILS # BLD AUTO: 5.4 K/UL (ref 1.82–7.42)
NEUTROPHILS NFR BLD: 57.6 % (ref 44–72)
NRBC # BLD AUTO: 0 K/UL
NRBC BLD-RTO: 0 /100 WBC (ref 0–0.2)
PLATELET # BLD AUTO: 253 K/UL (ref 164–446)
PMV BLD AUTO: 11.5 FL (ref 9–12.9)
RBC # BLD AUTO: 4.31 M/UL (ref 4.2–5.4)
RH BLD: NORMAL
T4 FREE SERPL-MCNC: 1.32 NG/DL (ref 0.93–1.7)
TSH SERPL DL<=0.005 MIU/L-ACNC: 1.39 UIU/ML (ref 0.38–5.33)
VIT B12 SERPL-MCNC: 448 PG/ML (ref 211–911)
WBC # BLD AUTO: 9.4 K/UL (ref 4.8–10.8)

## 2024-05-31 PROCEDURE — 36415 COLL VENOUS BLD VENIPUNCTURE: CPT

## 2024-05-31 PROCEDURE — 84443 ASSAY THYROID STIM HORMONE: CPT

## 2024-05-31 PROCEDURE — 84439 ASSAY OF FREE THYROXINE: CPT

## 2024-05-31 PROCEDURE — 84702 CHORIONIC GONADOTROPIN TEST: CPT

## 2024-05-31 PROCEDURE — 85025 COMPLETE CBC W/AUTO DIFF WBC: CPT

## 2024-05-31 PROCEDURE — 86258 DGP ANTIBODY EACH IG CLASS: CPT | Mod: 91

## 2024-05-31 PROCEDURE — 86900 BLOOD TYPING SEROLOGIC ABO: CPT

## 2024-05-31 PROCEDURE — 86901 BLOOD TYPING SEROLOGIC RH(D): CPT

## 2024-05-31 PROCEDURE — 82306 VITAMIN D 25 HYDROXY: CPT

## 2024-05-31 PROCEDURE — 82607 VITAMIN B-12: CPT

## 2024-05-31 PROCEDURE — 86364 TISS TRNSGLTMNASE EA IG CLAS: CPT | Mod: 91

## 2024-06-02 LAB
GLIADIN IGA SER IA-ACNC: <0.72 FLU (ref 0–4.99)
TTG IGA SER IA-ACNC: <1.02 FLU (ref 0–4.99)

## 2024-06-03 DIAGNOSIS — B96.89 BACTERIAL VAGINOSIS: ICD-10-CM

## 2024-06-03 DIAGNOSIS — N76.0 BACTERIAL VAGINOSIS: ICD-10-CM

## 2024-06-03 LAB
GLIADIN IGG SER IA-ACNC: <0.56 FLU (ref 0–4.99)
TTG IGG SER IA-ACNC: <0.82 FLU (ref 0–4.99)

## 2024-06-03 RX ORDER — METRONIDAZOLE 500 MG/1
500 TABLET ORAL 2 TIMES DAILY
Qty: 14 TABLET | Refills: 0 | Status: SHIPPED | OUTPATIENT
Start: 2024-06-03 | End: 2024-06-10

## 2024-06-05 LAB
C TRACH RRNA CVX QL NAA+PROBE: NEGATIVE
CYTOLOGIST CVX/VAG CYTO: NORMAL
CYTOLOGY CVX/VAG DOC CYTO: NORMAL
CYTOLOGY CVX/VAG DOC THIN PREP: NORMAL
HPV I/H RISK 4 DNA CVX QL PROBE+SIG AMP: NEGATIVE
N GONORRHOEA RRNA CVX QL NAA+PROBE: NEGATIVE
NOTE NL11727A: NORMAL
OTHER STN SPEC: NORMAL
STAT OF ADQ CVX/VAG CYTO-IMP: NORMAL

## 2024-06-07 ENCOUNTER — PATIENT MESSAGE (OUTPATIENT)
Dept: MEDICAL GROUP | Facility: PHYSICIAN GROUP | Age: 39
End: 2024-06-07
Payer: COMMERCIAL

## 2024-06-07 DIAGNOSIS — E06.3 HASHIMOTO'S THYROIDITIS: ICD-10-CM

## 2024-06-07 DIAGNOSIS — E03.8 HYPOTHYROIDISM DUE TO HASHIMOTO'S THYROIDITIS: ICD-10-CM

## 2024-06-07 DIAGNOSIS — E06.3 HYPOTHYROIDISM DUE TO HASHIMOTO'S THYROIDITIS: ICD-10-CM

## 2024-06-07 DIAGNOSIS — E55.9 VITAMIN D DEFICIENCY: ICD-10-CM

## 2024-06-10 ENCOUNTER — OFFICE VISIT (OUTPATIENT)
Dept: URGENT CARE | Facility: CLINIC | Age: 39
End: 2024-06-10
Payer: COMMERCIAL

## 2024-06-10 ENCOUNTER — APPOINTMENT (OUTPATIENT)
Dept: RADIOLOGY | Facility: MEDICAL CENTER | Age: 39
End: 2024-06-10
Attending: PHYSICIAN ASSISTANT
Payer: COMMERCIAL

## 2024-06-10 VITALS
SYSTOLIC BLOOD PRESSURE: 108 MMHG | BODY MASS INDEX: 22.66 KG/M2 | HEIGHT: 65 IN | WEIGHT: 136 LBS | RESPIRATION RATE: 18 BRPM | TEMPERATURE: 98.4 F | OXYGEN SATURATION: 98 % | HEART RATE: 108 BPM | DIASTOLIC BLOOD PRESSURE: 64 MMHG

## 2024-06-10 DIAGNOSIS — R10.33 UMBILICAL PAIN: ICD-10-CM

## 2024-06-10 PROCEDURE — 76705 ECHO EXAM OF ABDOMEN: CPT

## 2024-06-10 PROCEDURE — 3074F SYST BP LT 130 MM HG: CPT | Performed by: PHYSICIAN ASSISTANT

## 2024-06-10 PROCEDURE — 99214 OFFICE O/P EST MOD 30 MIN: CPT | Performed by: PHYSICIAN ASSISTANT

## 2024-06-10 PROCEDURE — 3078F DIAST BP <80 MM HG: CPT | Performed by: PHYSICIAN ASSISTANT

## 2024-06-10 RX ORDER — SUMATRIPTAN 50 MG/1
TABLET, FILM COATED ORAL
COMMUNITY
Start: 2024-05-28

## 2024-06-10 ASSESSMENT — ENCOUNTER SYMPTOMS
ABDOMINAL PAIN: 1
NAUSEA: 0
BLOOD IN STOOL: 0
DIARRHEA: 0
CONSTIPATION: 0
VOMITING: 0
CHILLS: 0
FEVER: 0

## 2024-06-10 ASSESSMENT — FIBROSIS 4 INDEX: FIB4 SCORE: 0.73

## 2024-06-10 NOTE — PROGRESS NOTES
Subjective     Roxane Mojica is a 39 y.o. female who presents with Abdominal Pain (X3days pt states belly button pain, possible hernia lifting heavy items)    HPI:  Roxane Mojica is a 39 y.o. female who presents today for evaluation of abdominal pain.  Patient reports that she has been having some pain in her bellybutton for the past 48 hours.  She says that she was lifting something heavy with her boyfriend on Saturday to help him take something to the dumpster.  She says that she did not notice any significant discomfort right at that time.  They were then laying in bed a few hours later and she said the bed was a few feet off the ground.  She was trying to avoid having to move the dog so she propelled herself off of the bed using her knees and legs.  She says the right when she did that she felt significant discomfort in her umbilical area and it has been fairly persistently painful since that time.  The pain does worsen with lifting as well as certain movements.  She has been taking OTC analgesics with which only provided very minimal relief.  She has not noted any nausea/vomiting, bowel habit changes, or fever/chills.  She also notes that she took medication for missed  that was noted on 2024.  She completed his medications approximately 2 weeks ago and was not having any abdominal discomfort over the last week or more until Saturday.  No lower abdominal discomfort or cramping.        Review of Systems   Constitutional:  Negative for chills and fever.   Gastrointestinal:  Positive for abdominal pain. Negative for blood in stool, constipation, diarrhea, nausea and vomiting.   Genitourinary:  Negative for dysuria.           PMH:  has a past medical history of Anxiety, Heart burn, Indigestion, Renal disorder (2011), and Thyroid disease.    She has no past medical history of Allergy or Anemia.  MEDS:   Current Outpatient Medications:     SUMAtriptan (IMITREX) 50 MG Tab, , Disp: ,  "Rfl:     metroNIDAZOLE (FLAGYL) 500 MG Tab, Take 1 Tablet by mouth 2 times a day for 7 days., Disp: 14 Tablet, Rfl: 0    SYNTHROID 100 MCG Tab, Take 1 Tablet by mouth every morning on an empty stomach., Disp: 90 Tablet, Rfl: 2    Prenatal MV-Min-Fe Fum-FA-DHA (PRENATAL 1 PO), Take  by mouth., Disp: , Rfl:     ibuprofen (MOTRIN) 600 MG Tab, Take 1 Tablet by mouth every 6 hours as needed for Moderate Pain., Disp: 30 Tablet, Rfl: 0    vitamin D2, Ergocalciferol, (DRISDOL) 1.25 MG (44628 UT) Cap capsule, Take 1 Capsule by mouth every 7 days., Disp: 12 Capsule, Rfl: 3  ALLERGIES:   Allergies   Allergen Reactions    Iodine Anaphylaxis and Hives     SURGHX:   Past Surgical History:   Procedure Laterality Date    GASTROSCOPY-ENDO  10/25/2013    Performed by Zohaib Felix M.D. at SURGERY [facility]    EGD WITH ASP/BX  10/25/2013    Performed by Zohaib Felix M.D. at [facility]    ORIF, ELBOW      right elbow    TONSILLECTOMY AND ADENOIDECTOMY  as child     SOCHX:  reports that she has been smoking cigarettes. She started smoking about 21 years ago. She has a 17 pack-year smoking history. She has never used smokeless tobacco. She reports that she does not currently use alcohol. She reports that she does not use drugs.  FH: Family history was reviewed, no pertinent findings to report        Objective     /64   Pulse (!) 108   Temp 36.9 °C (98.4 °F) (Temporal)   Resp 18   Ht 1.651 m (5' 5\")   Wt 61.7 kg (136 lb)   LMP 2024 Comment: missed  noted on 2024  SpO2 98%   Breastfeeding Unknown   BMI 22.63 kg/m²      Physical Exam  Constitutional:       General: She is not in acute distress.     Appearance: She is not diaphoretic.   HENT:      Head: Normocephalic and atraumatic.      Right Ear: External ear normal.      Left Ear: External ear normal.   Eyes:      Conjunctiva/sclera: Conjunctivae normal.      Pupils: Pupils are equal, round, and reactive to " light.   Pulmonary:      Effort: Pulmonary effort is normal. No respiratory distress.   Abdominal:      General: Abdomen is flat. Bowel sounds are normal.      Palpations: Abdomen is soft.      Tenderness: There is abdominal tenderness in the periumbilical area.      Comments: Patient with some periumbilical tenderness to palpation.  There is some very mild involuntary guarding.  There is no overlying erythema or other skin discoloration noted.  No obvious fullness with or without Valsalva.  Pain does significantly worsen with Valsalva while holding pressure in that region, however.   Musculoskeletal:      Cervical back: Normal range of motion.   Skin:     Findings: No rash.   Neurological:      Mental Status: She is alert and oriented to person, place, and time.   Psychiatric:         Mood and Affect: Mood and affect normal.         Cognition and Memory: Memory normal.         Judgment: Judgment normal.           Assessment & Plan     1. Umbilical pain  - US-HERNIA ABDOMEN; Future  Patient presents for evaluation of a few days of umbilical pain.  Notes that pain started a few hours after moving at a very heavy object to the dumpster but started while propelling herself off of the bed.  There is concern for hernia but also discussed with patient that this could just be a pulled muscle.  Ultrasound scheduled for 2:00 today.  Will notify her of results via [employer].      Differential Diagnosis, natural history, and supportive care discussed. Return to the Urgent Care or follow up with your PCP if symptoms fail to resolve, or for any new or worsening symptoms. Emergency room precautions discussed. Patient and/or family appears understanding of information.

## 2024-06-14 ENCOUNTER — APPOINTMENT (OUTPATIENT)
Dept: OBGYN | Facility: CLINIC | Age: 39
End: 2024-06-14
Payer: COMMERCIAL

## 2024-07-02 DIAGNOSIS — N93.9 ABNORMAL UTERINE BLEEDING: ICD-10-CM

## 2024-07-03 ENCOUNTER — APPOINTMENT (OUTPATIENT)
Dept: RADIOLOGY | Facility: MEDICAL CENTER | Age: 39
End: 2024-07-03
Attending: OBSTETRICS & GYNECOLOGY
Payer: COMMERCIAL

## 2024-07-05 ENCOUNTER — HOSPITAL ENCOUNTER (OUTPATIENT)
Dept: LAB | Facility: MEDICAL CENTER | Age: 39
End: 2024-07-05
Attending: OBSTETRICS & GYNECOLOGY
Payer: COMMERCIAL

## 2024-07-05 DIAGNOSIS — N93.9 ABNORMAL UTERINE BLEEDING: ICD-10-CM

## 2024-07-05 LAB
B-HCG SERPL-ACNC: 10.2 MIU/ML (ref 0–10)
ERYTHROCYTE [DISTWIDTH] IN BLOOD BY AUTOMATED COUNT: 42.3 FL (ref 35.9–50)
HCT VFR BLD AUTO: 42 % (ref 37–47)
HGB BLD-MCNC: 14.8 G/DL (ref 12–16)
MCH RBC QN AUTO: 33.4 PG (ref 27–33)
MCHC RBC AUTO-ENTMCNC: 35.2 G/DL (ref 32.2–35.5)
MCV RBC AUTO: 94.8 FL (ref 81.4–97.8)
PLATELET # BLD AUTO: 245 K/UL (ref 164–446)
PMV BLD AUTO: 10.9 FL (ref 9–12.9)
RBC # BLD AUTO: 4.43 M/UL (ref 4.2–5.4)
WBC # BLD AUTO: 9 K/UL (ref 4.8–10.8)

## 2024-07-05 PROCEDURE — 84702 CHORIONIC GONADOTROPIN TEST: CPT

## 2024-07-05 PROCEDURE — 36415 COLL VENOUS BLD VENIPUNCTURE: CPT

## 2024-07-05 PROCEDURE — 85027 COMPLETE CBC AUTOMATED: CPT

## 2024-07-07 ENCOUNTER — HOSPITAL ENCOUNTER (OUTPATIENT)
Dept: RADIOLOGY | Facility: MEDICAL CENTER | Age: 39
End: 2024-07-07
Attending: OBSTETRICS & GYNECOLOGY
Payer: COMMERCIAL

## 2024-07-07 DIAGNOSIS — N93.9 ABNORMAL UTERINE BLEEDING: ICD-10-CM

## 2024-07-07 PROCEDURE — 76830 TRANSVAGINAL US NON-OB: CPT

## 2024-07-11 ENCOUNTER — HOSPITAL ENCOUNTER (OUTPATIENT)
Dept: LAB | Facility: MEDICAL CENTER | Age: 39
End: 2024-07-11
Attending: NURSE PRACTITIONER
Payer: COMMERCIAL

## 2024-07-11 ENCOUNTER — TELEPHONE (OUTPATIENT)
Dept: OBGYN | Facility: CLINIC | Age: 39
End: 2024-07-11
Payer: COMMERCIAL

## 2024-07-11 ENCOUNTER — HOSPITAL ENCOUNTER (OUTPATIENT)
Dept: LAB | Facility: MEDICAL CENTER | Age: 39
End: 2024-07-11
Attending: OBSTETRICS & GYNECOLOGY
Payer: COMMERCIAL

## 2024-07-11 DIAGNOSIS — O03.9 MISCARRIAGE: ICD-10-CM

## 2024-07-11 LAB
ALBUMIN SERPL BCP-MCNC: 4.4 G/DL (ref 3.2–4.9)
ALBUMIN/GLOB SERPL: 1.5 G/DL
ALP SERPL-CCNC: 65 U/L (ref 30–99)
ALT SERPL-CCNC: 13 U/L (ref 2–50)
ANION GAP SERPL CALC-SCNC: 11 MMOL/L (ref 7–16)
AST SERPL-CCNC: 18 U/L (ref 12–45)
B-HCG SERPL-ACNC: 6.8 MIU/ML (ref 0–10)
BILIRUB SERPL-MCNC: 0.3 MG/DL (ref 0.1–1.5)
BUN SERPL-MCNC: 9 MG/DL (ref 8–22)
CALCIUM ALBUM COR SERPL-MCNC: 9 MG/DL (ref 8.5–10.5)
CALCIUM SERPL-MCNC: 9.3 MG/DL (ref 8.4–10.2)
CHLORIDE SERPL-SCNC: 104 MMOL/L (ref 96–112)
CO2 SERPL-SCNC: 25 MMOL/L (ref 20–33)
CREAT SERPL-MCNC: 0.66 MG/DL (ref 0.5–1.4)
GFR SERPLBLD CREATININE-BSD FMLA CKD-EPI: 115 ML/MIN/1.73 M 2
GLOBULIN SER CALC-MCNC: 2.9 G/DL (ref 1.9–3.5)
GLUCOSE SERPL-MCNC: 98 MG/DL (ref 65–99)
POTASSIUM SERPL-SCNC: 4.1 MMOL/L (ref 3.6–5.5)
PROT SERPL-MCNC: 7.3 G/DL (ref 6–8.2)
SODIUM SERPL-SCNC: 140 MMOL/L (ref 135–145)

## 2024-07-11 PROCEDURE — 80053 COMPREHEN METABOLIC PANEL: CPT

## 2024-07-11 PROCEDURE — 36415 COLL VENOUS BLD VENIPUNCTURE: CPT

## 2024-07-11 PROCEDURE — 84702 CHORIONIC GONADOTROPIN TEST: CPT

## 2024-08-03 ENCOUNTER — HOSPITAL ENCOUNTER (OUTPATIENT)
Dept: LAB | Facility: MEDICAL CENTER | Age: 39
End: 2024-08-03
Attending: INTERNAL MEDICINE
Payer: COMMERCIAL

## 2024-08-03 LAB
BASOPHILS # BLD AUTO: 0.5 % (ref 0–1.8)
BASOPHILS # BLD: 0.04 K/UL (ref 0–0.12)
EOSINOPHIL # BLD AUTO: 0.09 K/UL (ref 0–0.51)
EOSINOPHIL NFR BLD: 1.1 % (ref 0–6.9)
ERYTHROCYTE [DISTWIDTH] IN BLOOD BY AUTOMATED COUNT: 41 FL (ref 35.9–50)
HCT VFR BLD AUTO: 44.7 % (ref 37–47)
HGB BLD-MCNC: 15.3 G/DL (ref 12–16)
IMM GRANULOCYTES # BLD AUTO: 0.02 K/UL (ref 0–0.11)
IMM GRANULOCYTES NFR BLD AUTO: 0.2 % (ref 0–0.9)
LYMPHOCYTES # BLD AUTO: 2.81 K/UL (ref 1–4.8)
LYMPHOCYTES NFR BLD: 33.2 % (ref 22–41)
MCH RBC QN AUTO: 32.7 PG (ref 27–33)
MCHC RBC AUTO-ENTMCNC: 34.2 G/DL (ref 32.2–35.5)
MCV RBC AUTO: 95.5 FL (ref 81.4–97.8)
MONOCYTES # BLD AUTO: 0.49 K/UL (ref 0–0.85)
MONOCYTES NFR BLD AUTO: 5.8 % (ref 0–13.4)
NEUTROPHILS # BLD AUTO: 5.02 K/UL (ref 1.82–7.42)
NEUTROPHILS NFR BLD: 59.2 % (ref 44–72)
NRBC # BLD AUTO: 0 K/UL
NRBC BLD-RTO: 0 /100 WBC (ref 0–0.2)
PLATELET # BLD AUTO: 261 K/UL (ref 164–446)
PMV BLD AUTO: 10.6 FL (ref 9–12.9)
RBC # BLD AUTO: 4.68 M/UL (ref 4.2–5.4)
WBC # BLD AUTO: 8.5 K/UL (ref 4.8–10.8)

## 2024-08-03 PROCEDURE — 82785 ASSAY OF IGE: CPT

## 2024-08-03 PROCEDURE — 86003 ALLG SPEC IGE CRUDE XTRC EA: CPT | Mod: 91

## 2024-08-03 PROCEDURE — 83520 IMMUNOASSAY QUANT NOS NONAB: CPT

## 2024-08-03 PROCEDURE — 36415 COLL VENOUS BLD VENIPUNCTURE: CPT

## 2024-08-03 PROCEDURE — 86001 ALLERGEN SPECIFIC IGG: CPT

## 2024-08-03 PROCEDURE — 85025 COMPLETE CBC W/AUTO DIFF WBC: CPT

## 2024-08-06 LAB
DEPRECATED MISC ALLERGEN IGE RAST QL: NORMAL
HONEY BEE IGE QN: <0.1 KU/L
IGE SERPL-ACNC: 10 KU/L
PAPER WASP IGE QN: <0.1 KU/L
TRYPTASE SERPL-MCNC: 9.8 UG/L
WHITEFACED HORNET IGE QN: <0.1 KU/L
YELLOW HORNET IGE QN: <0.1 KU/L

## 2024-08-07 LAB — YELLOW JACKET IGG-MCNC: 2.37 MCG/ML

## 2024-08-19 ENCOUNTER — HOSPITAL ENCOUNTER (OUTPATIENT)
Dept: LAB | Facility: MEDICAL CENTER | Age: 39
End: 2024-08-19
Attending: INTERNAL MEDICINE
Payer: COMMERCIAL

## 2024-08-19 LAB — TSH SERPL DL<=0.005 MIU/L-ACNC: 6.34 UIU/ML (ref 0.38–5.33)

## 2024-08-19 PROCEDURE — 84443 ASSAY THYROID STIM HORMONE: CPT

## 2024-08-19 PROCEDURE — 36415 COLL VENOUS BLD VENIPUNCTURE: CPT

## 2024-08-20 ENCOUNTER — OFFICE VISIT (OUTPATIENT)
Dept: ENDOCRINOLOGY | Facility: MEDICAL CENTER | Age: 39
End: 2024-08-20
Attending: INTERNAL MEDICINE
Payer: COMMERCIAL

## 2024-08-20 ENCOUNTER — PHARMACY VISIT (OUTPATIENT)
Dept: PHARMACY | Facility: MEDICAL CENTER | Age: 39
End: 2024-08-20
Payer: COMMERCIAL

## 2024-08-20 VITALS
HEART RATE: 112 BPM | DIASTOLIC BLOOD PRESSURE: 62 MMHG | WEIGHT: 144 LBS | BODY MASS INDEX: 23.14 KG/M2 | SYSTOLIC BLOOD PRESSURE: 118 MMHG | HEIGHT: 66 IN | OXYGEN SATURATION: 99 %

## 2024-08-20 DIAGNOSIS — E55.9 VITAMIN D DEFICIENCY: ICD-10-CM

## 2024-08-20 DIAGNOSIS — E06.3 HASHIMOTO'S THYROIDITIS: ICD-10-CM

## 2024-08-20 DIAGNOSIS — E03.9 ACQUIRED HYPOTHYROIDISM: ICD-10-CM

## 2024-08-20 DIAGNOSIS — E53.8 B12 DEFICIENCY: ICD-10-CM

## 2024-08-20 PROCEDURE — 99211 OFF/OP EST MAY X REQ PHY/QHP: CPT | Performed by: INTERNAL MEDICINE

## 2024-08-20 PROCEDURE — RXMED WILLOW AMBULATORY MEDICATION CHARGE: Performed by: INTERNAL MEDICINE

## 2024-08-20 PROCEDURE — 99214 OFFICE O/P EST MOD 30 MIN: CPT | Performed by: INTERNAL MEDICINE

## 2024-08-20 PROCEDURE — RXOTC WILLOW AMBULATORY OTC CHARGE: Performed by: PHARMACIST

## 2024-08-20 PROCEDURE — 3074F SYST BP LT 130 MM HG: CPT | Performed by: INTERNAL MEDICINE

## 2024-08-20 PROCEDURE — 3078F DIAST BP <80 MM HG: CPT | Performed by: INTERNAL MEDICINE

## 2024-08-20 RX ORDER — LEVOTHYROXINE SODIUM 112 MCG
112 TABLET ORAL
Qty: 90 TABLET | Refills: 1 | Status: SHIPPED | OUTPATIENT
Start: 2024-08-20

## 2024-08-20 RX ORDER — LEVOTHYROXINE SODIUM 112 MCG
112 TABLET ORAL
Qty: 90 TABLET | Refills: 1 | Status: SHIPPED | OUTPATIENT
Start: 2024-08-20 | End: 2024-08-20 | Stop reason: SDUPTHER

## 2024-08-20 RX ORDER — LEVOTHYROXINE SODIUM 112 UG/1
112 TABLET ORAL DAILY
Qty: 7 TABLET | Refills: 0 | Status: SHIPPED | OUTPATIENT
Start: 2024-08-20

## 2024-08-20 ASSESSMENT — FIBROSIS 4 INDEX: FIB4 SCORE: 0.71

## 2024-08-21 NOTE — PROGRESS NOTES
Chief Complaint: Follow up for Primary Hypothyroidism. Patient was presented for a telehealth consultation via secure and encrypted videoconferencing technology.     SheHPI:     Roxane Toth is a 39 y.o. female here for follow up of Primary Hypothyroidism.  She was previously a patient of DEYSI Arce    Historically she had palpitations with Synthroid 100 mcg daily and initially did well on Synthroid 88 mcg daily until she had a TSH of 12 on September 2023  She also had hair loss secondary to iron deficiency  Social history wise, she works at [employer] at the [employer] and [employer] department      She remains on Synthroid 100mcg daily which has been her dose for over 6  months.   She reports excellent compliance and denies missing any daily doses.   She takes thyroid hormone prior to breakfast.   She  denies taking any iron, calcium supplements or antacids.    She admits that she has been drinking coffee after taking her thyroid hormone    She reports weight gain and fatigue    Her TSH is elevated 6.3 on August 2024    Her last vitamin D was low normal at 30 on May 2024    Last B12 was normal at 448 on May 2024      Patient's medications, allergies, and social histories were reviewed and updated as appropriate.      ROS:     CONS:     No fever, no chills   EYES:     No diplopia, no blurry vision   CV:           No chest pain, no palpitations   PULM:     No SOB, no cough, no hemoptysis.   GI:            No nausea, no vomiting, no diarrhea, no constipation   ENDO:     No polyuria, no polydipsia, no heat intolerance, no cold intolerance       Past Medical History:  Problem List:  2024-02: Headache associated with orgasm  2023-08: Dysmenorrhea  2022-01: RUBY (generalized anxiety disorder)  2022-01: GERD (gastroesophageal reflux disease)  2022-01: B12 deficiency  2022-01: Hair loss  2022-01: Non-celiac gluten sensitivity  2020-01: Hashimoto's thyroiditis  2020-01: Vitamin D deficiency  2019-01: Tobacco  "abuse  2017: Hot flashes  2017: Dyspepsia  2015: Subclinical hypothyroidism  2015: Family history of thyroid cancer  2015: Hypothyroidism  2015: Periodic headache syndrome, not intractable  2013-10: Abdominal pain, other specified site  2012: Health examination of defined subpopulation      Past Surgical History:  Past Surgical History:   Procedure Laterality Date    GASTROSCOPY-ENDO  10/25/2013    Performed by Zohaib Felix M.D. at SURGERY [facility]    EGD WITH ASP/BX  10/25/2013    Performed by Zohaib Felix M.D. at [facility]    ORIF, ELBOW  1991    right elbow    TONSILLECTOMY AND ADENOIDECTOMY  as child        Allergies:  Iodine     Social History:  Social History     Tobacco Use    Smoking status: Every Day     Current packs/day: 0.00     Average packs/day: 1 pack/day for 17.0 years (17.0 ttl pk-yrs)     Types: Cigarettes     Start date: 2003     Last attempt to quit: 2020     Years since quittin.6    Smokeless tobacco: Never   Vaping Use    Vaping status: Every Day    Substances: Nicotine    Devices: RefNirvahable tank   Substance Use Topics    Alcohol use: Not Currently     Comment: Rarely    Drug use: Never        Family History:   family history includes Cancer in her maternal grandfather and mother; Hypertension in her maternal grandfather and maternal grandmother; Other in her father; Stroke in her maternal grandfather.      PHYSICAL EXAM:   Vital signs: /62 (BP Location: Left arm, Patient Position: Sitting, BP Cuff Size: Adult long)   Pulse (!) 112   Ht 1.676 m (5' 6\")   Wt 65.3 kg (144 lb)   LMP 2024   SpO2 99%   BMI 23.24 kg/m²   GENERAL: Well-developed, well-nourished in no apparent distress.   EYE:  No ocular asymmetry, PERRLA  HENT: Pink, moist mucous membranes.    NECK: Thyroid is slightly enlarged and feels bosselated  CARDIOVASCULAR:  No murmurs  LUNGS: Clear breath sounds  ABDOMEN: Soft, nontender "   EXTREMITIES: No clubbing, cyanosis, or edema.   NEUROLOGICAL: No gross focal motor abnormalities   LYMPH: No cervical adenopathy seen  SKIN: No rashes, lesions.       Labs:  Lab Results   Component Value Date/Time    SODIUM 140 2024 03:05 PM    POTASSIUM 4.1 2024 03:05 PM    CHLORIDE 104 2024 03:05 PM    CO2 25 2024 03:05 PM    ANION 11.0 2024 03:05 PM    GLUCOSE 98 2024 03:05 PM    BUN 9 2024 03:05 PM    CREATININE 0.66 2024 03:05 PM    CREATININE 0.7 2007 09:55 PM    CALCIUM 9.3 2024 03:05 PM    ASTSGOT 18 2024 03:05 PM    ALTSGPT 13 2024 03:05 PM    TBILIRUBIN 0.3 2024 03:05 PM    ALBUMIN 4.4 2024 03:05 PM    TOTPROTEIN 7.3 2024 03:05 PM    GLOBULIN 2.9 2024 03:05 PM    AGRATIO 1.5 2024 03:05 PM       Lab Results   Component Value Date/Time    SODIUM 138 2019 0723    POTASSIUM 3.7 2019 0723    CHLORIDE 105 2019 0723    CO2 25 2019 0723    GLUCOSE 90 2019 0707    BUN 10 2019 0723    CREATININE 0.73 2019 0723    CALCIUM 9.8 2019 0723    ANION 8.0 2019 0723       Lab Results   Component Value Date/Time    CHOLSTRLTOT 147 2019 0707    TRIGLYCERIDE 47 2019 0707    HDL 60 2019 0707    LDL 78 2019 0707       Lab Results   Component Value Date/Time    TSHULTRASEN 0.540 2020 1730     Lab Results   Component Value Date/Time    FREET4 1.18 2020 1730     Lab Results   Component Value Date/Time    FREET3 3.40 2019 1726     No results found for: THYSTIMIG    Lab Results   Component Value Date/Time    MICROSOMALA 1556.9 (H) 2015 1043         Imagin2017 4:23 PM     HISTORY/REASON FOR EXAM:  Hyperthyroidism and history of heterogeneous thyroid        TECHNIQUE/EXAM DESCRIPTION:  Ultrasound of the soft tissues of the head and neck.     COMPARISON:  2015     FINDINGS:  The thyroid gland is  heterogeneous.  Vascularity is normal.     The right lobe of the thyroid gland measures 1.40 cm x 3.33 cm x 1.41 cm.  The left lobe of the thyroid gland measures 1.16 cm x 3.48 cm x 1.11 cm.  The isthmus measures 0.24 cm.           IMPRESSION:     1.  Thyroid gland again appears heterogeneous with appearance that is unchanged compared to the prior exam.     2.  No mass or nodule has developed since the prior exam.        ASSESSMENT/PLAN:     1. Acquired hypothyroidism  Uncontrolled  Explained to patient that coffee can affect absorption of Synthroid however her lifestyle is also important  She is paying a lot for Synthroid that I recommend Synthroid delivers pharmacy   will increase her Synthroid dose to compensate for her lifestyle so she can drink her coffee in the morning  Increase Synthroid 112 mcg daily  Repeat thyroid labs in 3 months I will update her reassess thyroid labs in 6 months        2. Hashimoto's thyroiditis  This is the etiology of her hypothyroidism  As proven by her elevated TPO antibodies of 1500 from May 2015    3. Vitamin D deficiency  Stable   Vitamin D labs were reviewed with patient  Recommend that she restart vitamin D supplementation  Continue monitoring levels     4. B12 deficiency  Controlled  Continue B12 sublingual 1000 mcg daily  Continue monitoring        Return in about 6 months (around 2/20/2025).       Thank you kindly for allowing me to participate in the thyroid care plan for this patient.    David Schmitz MD, FACE, [facility]      CC:   MARCY Goodman.

## 2024-08-26 PROCEDURE — RXMED WILLOW AMBULATORY MEDICATION CHARGE: Performed by: INTERNAL MEDICINE

## 2024-08-27 ENCOUNTER — PHARMACY VISIT (OUTPATIENT)
Dept: PHARMACY | Facility: MEDICAL CENTER | Age: 39
End: 2024-08-27
Payer: COMMERCIAL

## 2024-09-30 ENCOUNTER — IMMUNIZATION (OUTPATIENT)
Dept: OCCUPATIONAL MEDICINE | Facility: CLINIC | Age: 39
End: 2024-09-30

## 2024-09-30 DIAGNOSIS — Z23 NEED FOR VACCINATION: Primary | ICD-10-CM

## 2024-09-30 PROCEDURE — 90656 IIV3 VACC NO PRSV 0.5 ML IM: CPT | Performed by: PREVENTIVE MEDICINE

## 2024-10-30 ENCOUNTER — APPOINTMENT (OUTPATIENT)
Dept: MEDICAL GROUP | Facility: PHYSICIAN GROUP | Age: 39
End: 2024-10-30
Payer: COMMERCIAL

## 2024-11-06 ENCOUNTER — APPOINTMENT (OUTPATIENT)
Dept: MEDICAL GROUP | Facility: PHYSICIAN GROUP | Age: 39
End: 2024-11-06
Payer: COMMERCIAL

## 2024-11-09 ENCOUNTER — HOSPITAL ENCOUNTER (OUTPATIENT)
Dept: LAB | Facility: MEDICAL CENTER | Age: 39
End: 2024-11-09
Attending: INTERNAL MEDICINE
Payer: COMMERCIAL

## 2024-11-09 DIAGNOSIS — E53.8 B12 DEFICIENCY: ICD-10-CM

## 2024-11-09 DIAGNOSIS — E55.9 VITAMIN D DEFICIENCY: ICD-10-CM

## 2024-11-09 DIAGNOSIS — E06.3 HASHIMOTO'S THYROIDITIS: ICD-10-CM

## 2024-11-09 DIAGNOSIS — E03.9 ACQUIRED HYPOTHYROIDISM: ICD-10-CM

## 2024-11-09 LAB
25(OH)D3 SERPL-MCNC: 22 NG/ML (ref 30–100)
ALBUMIN SERPL BCP-MCNC: 4.4 G/DL (ref 3.2–4.9)
ALBUMIN/GLOB SERPL: 1.7 G/DL
ALP SERPL-CCNC: 69 U/L (ref 30–99)
ALT SERPL-CCNC: 17 U/L (ref 2–50)
ANION GAP SERPL CALC-SCNC: 12 MMOL/L (ref 7–16)
AST SERPL-CCNC: 20 U/L (ref 12–45)
BILIRUB SERPL-MCNC: 0.3 MG/DL (ref 0.1–1.5)
BUN SERPL-MCNC: 10 MG/DL (ref 8–22)
CALCIUM ALBUM COR SERPL-MCNC: 8.8 MG/DL (ref 8.5–10.5)
CALCIUM SERPL-MCNC: 9.1 MG/DL (ref 8.4–10.2)
CHLORIDE SERPL-SCNC: 103 MMOL/L (ref 96–112)
CO2 SERPL-SCNC: 25 MMOL/L (ref 20–33)
CREAT SERPL-MCNC: 0.64 MG/DL (ref 0.5–1.4)
GFR SERPLBLD CREATININE-BSD FMLA CKD-EPI: 116 ML/MIN/1.73 M 2
GLOBULIN SER CALC-MCNC: 2.6 G/DL (ref 1.9–3.5)
GLUCOSE SERPL-MCNC: 62 MG/DL (ref 65–99)
POTASSIUM SERPL-SCNC: 3.7 MMOL/L (ref 3.6–5.5)
PROT SERPL-MCNC: 7 G/DL (ref 6–8.2)
SODIUM SERPL-SCNC: 140 MMOL/L (ref 135–145)
T4 FREE SERPL-MCNC: 1.19 NG/DL (ref 0.93–1.7)
TSH SERPL DL<=0.005 MIU/L-ACNC: 3.04 UIU/ML (ref 0.38–5.33)
VIT B12 SERPL-MCNC: 509 PG/ML (ref 211–911)

## 2024-11-09 PROCEDURE — 80053 COMPREHEN METABOLIC PANEL: CPT

## 2024-11-09 PROCEDURE — 82306 VITAMIN D 25 HYDROXY: CPT

## 2024-11-09 PROCEDURE — 84439 ASSAY OF FREE THYROXINE: CPT

## 2024-11-09 PROCEDURE — 84443 ASSAY THYROID STIM HORMONE: CPT

## 2024-11-09 PROCEDURE — 36415 COLL VENOUS BLD VENIPUNCTURE: CPT

## 2024-11-09 PROCEDURE — 82607 VITAMIN B-12: CPT

## 2024-11-21 ENCOUNTER — OFFICE VISIT (OUTPATIENT)
Dept: ENDOCRINOLOGY | Facility: MEDICAL CENTER | Age: 39
End: 2024-11-21
Attending: INTERNAL MEDICINE
Payer: COMMERCIAL

## 2024-11-21 VITALS
HEIGHT: 66 IN | HEART RATE: 115 BPM | BODY MASS INDEX: 24.59 KG/M2 | DIASTOLIC BLOOD PRESSURE: 60 MMHG | WEIGHT: 153 LBS | OXYGEN SATURATION: 100 % | SYSTOLIC BLOOD PRESSURE: 112 MMHG

## 2024-11-21 DIAGNOSIS — E55.9 VITAMIN D DEFICIENCY: ICD-10-CM

## 2024-11-21 DIAGNOSIS — E03.9 ACQUIRED HYPOTHYROIDISM: ICD-10-CM

## 2024-11-21 DIAGNOSIS — E53.8 B12 DEFICIENCY: ICD-10-CM

## 2024-11-21 DIAGNOSIS — E06.3 HASHIMOTO'S THYROIDITIS: ICD-10-CM

## 2024-11-21 DIAGNOSIS — E16.2 HYPOGLYCEMIA: ICD-10-CM

## 2024-11-21 PROCEDURE — 3074F SYST BP LT 130 MM HG: CPT | Performed by: INTERNAL MEDICINE

## 2024-11-21 PROCEDURE — 3078F DIAST BP <80 MM HG: CPT | Performed by: INTERNAL MEDICINE

## 2024-11-21 PROCEDURE — 99214 OFFICE O/P EST MOD 30 MIN: CPT | Performed by: INTERNAL MEDICINE

## 2024-11-21 PROCEDURE — RXMED WILLOW AMBULATORY MEDICATION CHARGE: Performed by: INTERNAL MEDICINE

## 2024-11-21 PROCEDURE — 99211 OFF/OP EST MAY X REQ PHY/QHP: CPT | Performed by: INTERNAL MEDICINE

## 2024-11-21 RX ORDER — LEVOTHYROXINE SODIUM 125 MCG
125 TABLET ORAL
Qty: 30 TABLET | Refills: 6 | Status: SHIPPED | OUTPATIENT
Start: 2024-11-21

## 2024-11-21 ASSESSMENT — FIBROSIS 4 INDEX: FIB4 SCORE: 0.69

## 2024-11-21 NOTE — PROGRESS NOTES
Chief Complaint: Follow up for Primary Hypothyroidism. Patient was presented for a telehealth consultation via secure and encrypted videoconferencing technology.     SheHPI:     Roxane Toth is a 39 y.o. female here for follow up of Primary Hypothyroidism.  She was previously a patient of DEYSI Arce    Historically she had palpitations with Synthroid 100 mcg daily and initially did well on Synthroid 88 mcg daily until she had a TSH of 12 on September 2023  She also had hair loss secondary to iron deficiency  Social history wise, she works at [employer] at the [employer] department      She remains on Synthroid 112mcg daily which has been her dose for over 6  months.   She reports excellent compliance and denies missing any daily doses.   She takes thyroid hormone prior to breakfast.   She  denies taking any iron, calcium supplements or antacids.    She admits that she has been drinking coffee after taking her thyroid hormone    She reports weight gain and fatigue  Also has cold intolerance and constipation  Her TSH is 3.0 on 11/2024  Her TSH was elevated 6.3 on August 2024    Her last vitamin D was low  at 22 on 11/2024  Shew admits to not taking    Last B12 was normal at 500 on Nov 2024      Patient's medications, allergies, and social histories were reviewed and updated as appropriate.      ROS:     CONS:     No fever, no chills   EYES:     No diplopia, no blurry vision   CV:           No chest pain, no palpitations   PULM:     No SOB, no cough, no hemoptysis.   GI:            No nausea, no vomiting, no diarrhea, no constipation   ENDO:     No polyuria, no polydipsia, no heat intolerance, no cold intolerance       Past Medical History:  Problem List:  2024-02: Headache associated with orgasm  2023-08: Dysmenorrhea  2022-01: RUBY (generalized anxiety disorder)  2022-01: GERD (gastroesophageal reflux disease)  2022-01: B12 deficiency  2022-01: Hair loss  2022-01: Non-celiac gluten  "sensitivity  2020: Hashimoto's thyroiditis  2020: Vitamin D deficiency  2019: Tobacco abuse  2017: Hot flashes  2017: Dyspepsia  2015: Subclinical hypothyroidism  2015: Family history of thyroid cancer  2015: Hypothyroidism  2015: Periodic headache syndrome, not intractable  2013-10: Abdominal pain, other specified site  2012: Health examination of defined subpopulation      Past Surgical History:  Past Surgical History:   Procedure Laterality Date    GASTROSCOPY-ENDO  10/25/2013    Performed by Zohaib Felix M.D. at SURGERY [facility]    EGD WITH ASP/BX  10/25/2013    Performed by Zohaib Felix M.D. at [facility]    ORIF, ELBOW  1991    right elbow    TONSILLECTOMY AND ADENOIDECTOMY  as child        Allergies:  Iodine     Social History:  Social History     Tobacco Use    Smoking status: Every Day     Current packs/day: 0.00     Average packs/day: 1 pack/day for 17.0 years (17.0 ttl pk-yrs)     Types: Cigarettes     Start date: 2003     Last attempt to quit: 2020     Years since quittin.8    Smokeless tobacco: Never   Vaping Use    Vaping status: Every Day    Substances: Nicotine    Devices: RefClearwaveble tank   Substance Use Topics    Alcohol use: Not Currently     Comment: Rarely    Drug use: Never        Family History:   family history includes Cancer in her maternal grandfather and mother; Hypertension in her maternal grandfather and maternal grandmother; Other in her father; Stroke in her maternal grandfather.      PHYSICAL EXAM:   Vital signs: /60   Pulse (!) 115   Ht 1.676 m (5' 6\") Comment: pt stated  Wt 69.4 kg (153 lb)   LMP 2024   SpO2 100%   Breastfeeding Unknown   BMI 24.69 kg/m²   GENERAL: Well-developed, well-nourished in no apparent distress.   EYE:  No ocular asymmetry, PERRLA  HENT: Pink, moist mucous membranes.    NECK: Thyroid is slightly enlarged and feels bosselated  CARDIOVASCULAR:  No " murmurs  LUNGS: Clear breath sounds  ABDOMEN: Soft, nontender   EXTREMITIES: No clubbing, cyanosis, or edema.   NEUROLOGICAL: No gross focal motor abnormalities   LYMPH: No cervical adenopathy seen  SKIN: No rashes, lesions.       Labs:  Lab Results   Component Value Date/Time    SODIUM 140 2024 01:45 PM    POTASSIUM 3.7 2024 01:45 PM    CHLORIDE 103 2024 01:45 PM    CO2 25 2024 01:45 PM    ANION 12.0 2024 01:45 PM    GLUCOSE 62 (L) 2024 01:45 PM    BUN 10 2024 01:45 PM    CREATININE 0.64 2024 01:45 PM    CREATININE 0.7 2007 09:55 PM    CALCIUM 9.1 2024 01:45 PM    ASTSGOT 20 2024 01:45 PM    ALTSGPT 17 2024 01:45 PM    TBILIRUBIN 0.3 2024 01:45 PM    ALBUMIN 4.4 2024 01:45 PM    TOTPROTEIN 7.0 2024 01:45 PM    GLOBULIN 2.6 2024 01:45 PM    AGRATIO 1.7 2024 01:45 PM       Lab Results   Component Value Date/Time    SODIUM 138 2019 0723    POTASSIUM 3.7 2019 0723    CHLORIDE 105 2019 0723    CO2 25 2019 0723    GLUCOSE 90 2019 0707    BUN 10 2019 0723    CREATININE 0.73 2019 0723    CALCIUM 9.8 2019 0723    ANION 8.0 2019 0723       Lab Results   Component Value Date/Time    CHOLSTRLTOT 147 2019 0707    TRIGLYCERIDE 47 2019 0707    HDL 60 2019 0707    LDL 78 2019 0707       Lab Results   Component Value Date/Time    TSHULTRASEN 0.540 2020 1730     Lab Results   Component Value Date/Time    FREET4 1.18 2020 1730     Lab Results   Component Value Date/Time    FREET3 3.40 2019 1726     No results found for: THYSTIMIG    Lab Results   Component Value Date/Time    MICROSOMALA 1556.9 (H) 2015 1043         Imagin2017 4:23 PM     HISTORY/REASON FOR EXAM:  Hyperthyroidism and history of heterogeneous thyroid        TECHNIQUE/EXAM DESCRIPTION:  Ultrasound of the soft tissues of the head and neck.     COMPARISON:   06/01/2015     FINDINGS:  The thyroid gland is heterogeneous.  Vascularity is normal.     The right lobe of the thyroid gland measures 1.40 cm x 3.33 cm x 1.41 cm.  The left lobe of the thyroid gland measures 1.16 cm x 3.48 cm x 1.11 cm.  The isthmus measures 0.24 cm.           IMPRESSION:     1.  Thyroid gland again appears heterogeneous with appearance that is unchanged compared to the prior exam.     2.  No mass or nodule has developed since the prior exam.        ASSESSMENT/PLAN:     1. Acquired hypothyroidism  Uncontrolled  TSH is suboptimal at 3.0   she is symptomatic  Adjust Synthroid to 125 mcg daily  Repeat thyroid labs in 2 months if she is still not doing better despite optimal TSH levels we will try Arjay Thyroid  Follow-up in 6 months with repeat thyroid labs        2. Hashimoto's thyroiditis  This is the etiology of her hypothyroidism  As proven by her elevated TPO antibodies of 1500 from May 2015    3. Vitamin D deficiency  Uncontrolled start vitamin D3 5000 IU daily repeat labs in 3 to 6 months    4. B12 deficiency  Controlled  Continue B12 sublingual 1000 mcg daily  Continue monitoring    5. Hypoglycemia  Unstable incidental hypoglycemia noted on recent labs I want her to have a check of her morning ACTH and cortisol and 21-hydroxylase antibodies to rule out adrenal insufficiency and Refugio's disease in light of her history of Hashimoto's thyroiditis and autoimmune hypothyroidism        Return in about 6 months (around 5/21/2025).       Thank you kindly for allowing me to participate in the thyroid care plan for this patient.    David Schmitz MD, FACE, [facility]      CC:   JOSEPH Goodman

## 2024-11-22 ENCOUNTER — PHARMACY VISIT (OUTPATIENT)
Dept: PHARMACY | Facility: MEDICAL CENTER | Age: 39
End: 2024-11-22
Payer: COMMERCIAL

## 2024-12-19 PROCEDURE — RXMED WILLOW AMBULATORY MEDICATION CHARGE: Performed by: INTERNAL MEDICINE

## 2024-12-20 ENCOUNTER — PHARMACY VISIT (OUTPATIENT)
Dept: PHARMACY | Facility: MEDICAL CENTER | Age: 39
End: 2024-12-20
Payer: COMMERCIAL

## 2025-01-14 ENCOUNTER — APPOINTMENT (OUTPATIENT)
Dept: MEDICAL GROUP | Facility: PHYSICIAN GROUP | Age: 40
End: 2025-01-14
Payer: COMMERCIAL

## 2025-01-22 ENCOUNTER — APPOINTMENT (OUTPATIENT)
Dept: MEDICAL GROUP | Facility: PHYSICIAN GROUP | Age: 40
End: 2025-01-22
Payer: COMMERCIAL